# Patient Record
Sex: FEMALE | Race: WHITE | NOT HISPANIC OR LATINO | Employment: FULL TIME | URBAN - METROPOLITAN AREA
[De-identification: names, ages, dates, MRNs, and addresses within clinical notes are randomized per-mention and may not be internally consistent; named-entity substitution may affect disease eponyms.]

---

## 2017-01-24 ENCOUNTER — TELEPHONE (OUTPATIENT)
Dept: FAMILY MEDICINE | Facility: CLINIC | Age: 52
End: 2017-01-24

## 2017-01-30 ENCOUNTER — OFFICE VISIT (OUTPATIENT)
Dept: FAMILY MEDICINE | Facility: CLINIC | Age: 52
End: 2017-01-30
Payer: COMMERCIAL

## 2017-01-30 VITALS
DIASTOLIC BLOOD PRESSURE: 74 MMHG | SYSTOLIC BLOOD PRESSURE: 122 MMHG | TEMPERATURE: 97.4 F | HEIGHT: 66 IN | BODY MASS INDEX: 29.01 KG/M2 | OXYGEN SATURATION: 100 % | HEART RATE: 110 BPM | WEIGHT: 180.5 LBS

## 2017-01-30 DIAGNOSIS — N81.11 CYSTOCELE, MIDLINE: Primary | ICD-10-CM

## 2017-01-30 PROCEDURE — 99213 OFFICE O/P EST LOW 20 MIN: CPT | Performed by: FAMILY MEDICINE

## 2017-01-30 ASSESSMENT — PAIN SCALES - GENERAL: PAINLEVEL: NO PAIN (0)

## 2017-01-30 NOTE — NURSING NOTE
"Chief Complaint   Patient presents with     Vaginal Problem     Lump       Initial /74 mmHg  Pulse 110  Temp(Src) 97.4  F (36.3  C) (Temporal)  Ht 5' 6\" (1.676 m)  Wt 180 lb 8 oz (81.874 kg)  BMI 29.15 kg/m2  SpO2 100% Estimated body mass index is 29.15 kg/(m^2) as calculated from the following:    Height as of this encounter: 5' 6\" (1.676 m).    Weight as of this encounter: 180 lb 8 oz (81.874 kg).  BP completed using cuff size: jo Lloyd MA  "

## 2017-01-30 NOTE — PROGRESS NOTES
"  SUBJECTIVE:                                                    S:  Radha Mcdermott is a 51 year old female who presents to the clinic complaining of possibly pelvic prolapse. Patient states that she has been experiencing a URI for about 1 month. Two weeks ago, she coughed hard and \"felt like an organ fell, partially into her vagina\". Since then, she has been experiencing pelvic pressure, worsening when she coughs. She states that this feels like \"a squishy lump\". Patient reports that this is not painful, but is uncomfortable, rating it 3/10. She is able to sleep and do all her ADL's. She denies any urinary leakage or other associated symptoms.       Problem list and histories reviewed & adjusted, as indicated.  Additional history: as documented    Patient Active Problem List   Diagnosis     Dysmenorrhea     Contraceptive management     SCREENING-THYROID DISORDER-cold nodule     Hypocalcemia     Post-surgical hypoparathyroidism (H)     Disorder of magnesium metabolism     Papillary thyroid carcinoma (H)     Anxiety state     Postsurgical hypothyroidism     Iron deficiency anemia     CARDIOVASCULAR SCREENING; LDL GOAL LESS THAN 160     Health Care Home     Pulmonary nodules     Abdominal mass     Shoulder joint pain     Major depressive disorder, recurrent episode, mild (H)     Past Surgical History   Procedure Laterality Date      remove tonsils/adenoids,14+ y/o  1984     C thyroidectomy  10/07/2005     Total thyroidectomy.  F-[facility]. Further lymph node resection in 12/06     Biopsy/removal, lymph node(s)  10/27/2009     Surgical resection of lymph nodes, neck.  Scar revision. [facility].      biopsy/excision lymph node open superficial  2007     removal of some more lymph nodes in the neck.        Social History   Substance Use Topics     Smoking status: Former Smoker -- 0.50 packs/day for 15 years     Quit date: 03/01/1998     Smokeless tobacco: Never Used      Comment: no smoking in the home     Alcohol Use: Yes " "     Comment: social     Family History   Problem Relation Age of Onset     Allergies Son      pcn     Alzheimer Disease Paternal Grandmother      CANCER Maternal Aunt      breast - mid-30s     DIABETES Maternal Grandmother      HEART DISEASE Paternal Grandmother      Lipids Mother      OSTEOPOROSIS Mother      CEREBROVASCULAR DISEASE Paternal Grandmother      Thyroid Disease Maternal Grandmother      thyroid disease     CANCER Paternal Grandfather      prostate cancer     CANCER Father      skin     Thyroid Disease Paternal Grandmother          Current Outpatient Prescriptions   Medication Sig Dispense Refill     calcitRIOL (ROCALTROL) 0.25 MCG capsule Take 1 capsule (0.25 mcg) by mouth 2 times daily 180 capsule 3     magnesium oxide (MAG-OX) 400 MG tablet Take 0.5 tablets (200 mg) by mouth 2 times daily 60 tablet 5     fluticasone (FLONASE) 50 MCG/ACT nasal spray Spray 2 sprays into both nostrils daily 1 Bottle 1     SYNTHROID 125 MCG tablet Take 1 tablet (125 mcg) by mouth daily 90 tablet 3     multivitamin, therapeutic (THERA-VIT) TABS Take 1 tablet by mouth daily       EPINEPHrine (EPIPEN) 0.3 MG/0.3ML injection Inject 0.3 mg into the muscle once as needed for anaphylaxis       docusate sodium 100 MG tablet Take 100 mg by mouth 2 times daily as needed for constipation 60 tablet 11     Allergies   Allergen Reactions     No Known Drug Allergies        ROS:  All other ROS reviewed and are negative or non-contributory except as stated in HPI.    OBJECTIVE:                                                    /74 mmHg  Pulse 110  Temp(Src) 97.4  F (36.3  C) (Temporal)  Ht 5' 6\" (1.676 m)  Wt 180 lb 8 oz (81.874 kg)  BMI 29.15 kg/m2  SpO2 100%  Body mass index is 29.15 kg/(m^2).   Vitals noted.  Patient alert, oriented, and in no acute distress.  PELVIC: Vaginal exam reveals normal external and internal genitalia.  Cervix is closed, long and thick.  No lesions or abnormalities seen.  Bimanual exam reveals a " "nontender, nongravid uterus with no CMT.  No adnexal masses or tenderness. Moderate cystocele is noted and worsens with valsalva. No rectocele. No uterocele.     Diagnostic Test Results:  No orders of the defined types were placed in this encounter.          ASSESSMENT:                                                        ICD-10-CM    1. Cystocele, midline N81.11         PLAN:                                                      On exam, patient exhibits a moderate cystocele. Discussed that this \"prolapse\" will likely improve when she gets over her URI and stops coughing. Discussed options to treat this, including just monitoring her symptoms for the time being, as well as Kegels, Pessaries, and surgery. I encouraged her to wait until her URI subsides, and monitor her symptoms. She can make sure that she is emptying all the way when urinating, including manual assistance in emptying if needed. Patient will monitor for vaginal bleeding, urinary leakage, or other symptoms. If her symptoms become intolerable, she will notify me and I will likely set her up for a consult with the urologist. I did provide her with information regarding cystoceles. Patient is in agreement with this treatment plan. Follow up as needed, notify me with any worsening symptoms.     This document serves as a record of services personally performed by Jenna Rey MD. It was created on their behalf by Yuko Mitchell, a trained medical scribe. The creation of this record is based on the provider's personal observations and the statements of the patient. This document has been checked and approved by the attending provider.      Jenna Rey MD  [facility]  "

## 2017-02-09 PROBLEM — N81.11 CYSTOCELE, MIDLINE: Status: ACTIVE | Noted: 2017-02-09

## 2017-05-16 ENCOUNTER — MYC MEDICAL ADVICE (OUTPATIENT)
Dept: FAMILY MEDICINE | Facility: CLINIC | Age: 52
End: 2017-05-16

## 2017-05-16 DIAGNOSIS — F41.1 ANXIETY STATE: ICD-10-CM

## 2017-05-16 RX ORDER — SERTRALINE HYDROCHLORIDE 100 MG/1
100 TABLET, FILM COATED ORAL DAILY
Qty: 90 TABLET | Refills: 1 | Status: CANCELLED | OUTPATIENT
Start: 2017-05-16

## 2017-05-16 NOTE — TELEPHONE ENCOUNTER
Zoloft     Last Written Prescription Date: 04/06/16  Last Fill Quantity: 90, # refills: 1  Last Office Visit with [facility] primary care provider:  01/30/17        Last PHQ-9 score on record=   PHQ-9 SCORE 5/9/2016   Total Score -   Total Score MyChart 9 (Mild depression)   Total Score -

## 2017-06-07 ASSESSMENT — ENCOUNTER SYMPTOMS
STIFFNESS: 1
MYALGIAS: 1
NECK PAIN: 0
PANIC: 0
DEPRESSION: 1
DECREASED CONCENTRATION: 1
MUSCLE CRAMPS: 1
NERVOUS/ANXIOUS: 1
BACK PAIN: 0
ARTHRALGIAS: 1
JOINT SWELLING: 0
INSOMNIA: 1
MUSCLE WEAKNESS: 0

## 2017-06-13 ENCOUNTER — OFFICE VISIT (OUTPATIENT)
Dept: ENDOCRINOLOGY | Facility: CLINIC | Age: 52
End: 2017-06-13

## 2017-06-13 VITALS
DIASTOLIC BLOOD PRESSURE: 69 MMHG | BODY MASS INDEX: 29.3 KG/M2 | SYSTOLIC BLOOD PRESSURE: 117 MMHG | HEART RATE: 79 BPM | HEIGHT: 66 IN | WEIGHT: 182.3 LBS

## 2017-06-13 DIAGNOSIS — R97.8 ABNORMAL TUMOR MARKERS: ICD-10-CM

## 2017-06-13 DIAGNOSIS — E89.0 POSTSURGICAL HYPOTHYROIDISM: Chronic | ICD-10-CM

## 2017-06-13 DIAGNOSIS — E89.2 POST-SURGICAL HYPOPARATHYROIDISM (H): Primary | Chronic | ICD-10-CM

## 2017-06-13 DIAGNOSIS — E89.0 POSTSURGICAL HYPOTHYROIDISM: ICD-10-CM

## 2017-06-13 DIAGNOSIS — E20.9 HYPOPARATHYROIDISM (H): ICD-10-CM

## 2017-06-13 DIAGNOSIS — E87.6 HYPOKALEMIA: ICD-10-CM

## 2017-06-13 DIAGNOSIS — E89.2 POST-SURGICAL HYPOPARATHYROIDISM (H): Chronic | ICD-10-CM

## 2017-06-13 DIAGNOSIS — R91.8 PULMONARY NODULES: ICD-10-CM

## 2017-06-13 DIAGNOSIS — C73 MALIGNANT NEOPLASM OF THYROID GLAND (H): ICD-10-CM

## 2017-06-13 DIAGNOSIS — C73 PAPILLARY THYROID CARCINOMA (H): ICD-10-CM

## 2017-06-13 LAB
CREAT SERPL-MCNC: 0.91 MG/DL (ref 0.52–1.04)
GFR SERPL CREATININE-BSD FRML MDRD: 65 ML/MIN/1.7M2
MAGNESIUM SERPL-MCNC: 2.2 MG/DL (ref 1.6–2.3)
PHOSPHATE SERPL-MCNC: 3.4 MG/DL (ref 2.5–4.5)
POTASSIUM SERPL-SCNC: 3.7 MMOL/L (ref 3.4–5.3)
T4 FREE SERPL-MCNC: 1.06 NG/DL (ref 0.76–1.46)
TSH SERPL DL<=0.05 MIU/L-ACNC: 0.11 MU/L (ref 0.4–4)

## 2017-06-13 ASSESSMENT — PAIN SCALES - GENERAL: PAINLEVEL: NO PAIN (0)

## 2017-06-13 NOTE — MR AVS SNAPSHOT
After Visit Summary   6/13/2017    Radha Mcdermott    MRN: [id number]           Patient Information     Date Of Birth          1965        Visit Information        Provider Department      6/13/2017 2:00 PM Vero Arevalo MD [facility]        Today's Diagnoses     Post-surgical hypoparathyroidism (H)    -  1    Postsurgical hypothyroidism        Papillary thyroid carcinoma (H)          Care Instructions    To expedite your medication refill(s), please contact your pharmacy and have them fax a refill request to: [phone].  *Please allow 3 business days for routine medication refills.  *Please allow 5 business days for controlled substance medication refills.  --------------------  For scheduling appointments (including lab work), please request an appointment through [employer], or call: [phone].    For questions for your provider or the endocrine nurse, please send a [employer] message.  For after-hours urgent issues, please dial [phone], and ask to speak with the Endocrinologist On-Call.  --------------------  Please Note: If you are active on [employer], all future test results will be sent by [employer] message only and will no longer be sent by mail. You may also receive communication directly from your physician.            Follow-ups after your visit        Follow-up notes from your care team     Return in about 6 months (around 12/13/2017).      Your next 10 appointments already scheduled     Dec 12, 2017  1:30 PM CST   (Arrive by 1:15 PM)   RETURN ENDOCRINE with Vero Arevalo MD   [facility] ([facility])    [address] [place] [id number] [phone]              Future tests that were ordered for you today     Open Future Orders        Priority Expected Expires Ordered    TSH Routine  6/13/2018 6/13/2017    T4 free Routine  6/13/2018 6/13/2017    Thyroglobulin (Total, antibody & recovery %)  "Routine  6/13/2018 6/13/2017            Who to contact     Please call your clinic at [phone] to:    Ask questions about your health    Make or cancel appointments    Discuss your medicines    Learn about your test results    Speak to your doctor   If you have compliments or concerns about an experience at your clinic, or if you wish to file a complaint, please contact [facility] Patient Relations at [phone] or email us at [email or web address]         Additional Information About Your Visit        [email or web address] Information     [employer] gives you secure access to your electronic health record. If you see a primary care provider, you can also send messages to your care team and make appointments. If you have questions, please call your primary care clinic.  If you do not have a primary care provider, please call [phone] and they will assist you.      [employer] is an electronic gateway that provides easy, online access to your medical records. With [employer], you can request a clinic appointment, read your test results, renew a prescription or communicate with your care team.     To access your existing account, please contact your [facility] or call [phone] for assistance.        Care EveryWhere ID     This is your Care EveryWhere ID. This could be used by other organizations to access your [facility] [place] medical records  [id number]        Your Vitals Were     Pulse Height BMI (Body Mass Index)             79 1.664 m (5' 5.5\") 29.87 kg/m2          Blood Pressure from Last 3 Encounters:   06/13/17 117/69   01/30/17 122/74   12/12/16 111/77    Weight from Last 3 Encounters:   06/13/17 82.7 kg (182 lb 4.8 oz)   01/30/17 81.9 kg (180 lb 8 oz)   12/12/16 81.1 kg (178 lb 12.8 oz)               Primary Care Provider Office Phone # Fax #    Jenna Rey [phone]       [facility] [address] [place] " [phone]        Thank you!     Thank you for choosing [facility] ENDOCRIN[facility]  for your care. Our goal is always to provide you with excellent care. Hearing back from our patients is one way we can continue to improve our services. Please take a few minutes to complete the written survey that you may receive in the mail after your visit with us. Thank you!             Your Updated Medication List - Protect others around you: Learn how to safely use, store and throw away your medicines at www.disposemymeds.org.          This list is accurate as of: 6/13/17  2:34 PM.  Always use your most recent med list.                   Brand Name Dispense Instructions for use    calcitRIOL 0.25 MCG capsule    ROCALTROL    180 capsule    Take 1 capsule (0.25 mcg) by mouth 2 times daily       docusate sodium 100 MG tablet    COLACE    60 tablet    Take 100 mg by mouth 2 times daily as needed for constipation       EPINEPHrine 0.3 MG/0.3ML injection      Inject 0.3 mg into the muscle once as needed for anaphylaxis       fluticasone 50 MCG/ACT spray    FLONASE    1 Bottle    Spray 2 sprays into both nostrils daily       magnesium oxide 400 MG tablet    MAG-OX    60 tablet    Take 0.5 tablets (200 mg) by mouth 2 times daily       multivitamin, therapeutic Tabs tablet      Take 1 tablet by mouth daily       sertraline 50 MG tablet    ZOLOFT    30 tablet    Take 1/2 tablet (25 mg) for 1 week, then increase to 1 tablet orally daily       SYNTHROID 125 MCG tablet   Generic drug:  levothyroxine     90 tablet    Take 1 tablet (125 mcg) by mouth daily       TUMS ULTRA 1000 1000 MG Chew   Generic drug:  calcium carbonate antacid      Take 1,000 mg by mouth 2 times daily This is actually a gummy tablet

## 2017-06-13 NOTE — LETTER
6/13/2017     RE: Radha Mcdermott  [address] [facility] [address]     Dear Colleague,    Thank you for referring your patient, Radha Mcdermott, to the [facility] ENDOCRINOLOGY at [facility]. Please see a copy of my visit note below.     Assessment   1.  Papillary carcinoma thyroid, multifocal, largest tumor 5 cm, + ETE, multiple node positive. She been treated with total thyroidectomy and left neck dissection (in 3 operations) and also 132 mCi 131.  -     Hypothyroidism due to thyroidectomy. As per # 1.  Treat to target TH < 0.4.     surgical hypoparathyroidism (partial) since first operation -  Unstable due to increase in calcitriol since last visit.       Persistent thyroglobulinemia -Stable Tg comparing 2014 with 2010 US -     pulmonary nodules, subcm, stable on serial imaging, most recent 6/16. I am noting this as it still may be relevant to # 1    Abnormal imaging results - abnormal outside PET with ovarian uptake.  She followed up with GYN on this in the past.  Not addressed.      Vero Arevalo MD  Endocrinology & Diabetes.      Cc/ HPI  51  year old seen for post surgical hypothyroidism, post surgical hypoparathyroidism and Papillary thyroid cancer.   She was last seen by me 12/16.  Since then, we doubled the calcitriol to 0.5 mcg/day. With this she is doing much better, relative to the cramps.     She has been treated with 3 operations  10/7/05   Thyroidectomy removing  left sided PCT 5.0 x 3.0 x 2.8 cm, extending through the capsule into parathyroidal soft tissues.  There was - Microscopic foci of papillary carcinoma in the right lobe and one LN removed was positive for pCT.  Her post operative course was  complicated by hypoparathyroidism.   132 mCi 131I 11/05.     12/28/06   Left modified neck dissection.    10/22/09 selective neck dissection to include level 7 and level 4 on the left.  Surgical pathology was positive for metastatic PCT in lymph nodes  (see results)    We have the following tumor marker data:  9/9/05: Tg 290, JUAN DIEGO < 1, TSH   Operation # 1  10/28/05: Tg 131, JUAN DIEGO < 1, TSH 15.12  11/28/05: Tg 116, JUAN DIEGO <1, ,7  2/3/06: Tg 26.7, JUAN DIEGO < 1, TSH 14.43  6/2/06: Tg 2.2, JUAN DIEOG < 1, TSH 0.36  11/3/06: Tg 18, JUAN DIEGO < 1  Operation # 2  1/30/07: Tg 1.1, JUAN DIEGO < 1, TSH   4/17/07: Tg 2, JUAN DIEGO < 1, TSH   12/11/07: Tg 13.9, JUAN DIEGO < 1, TSH 42.02  1/8/08: Tg 2.7, JUAN DIEGO < 1, TSH   3/3/09: Tg 1.0, JUAN DIEGO < 0.4, TSH   4/24/09: Tg 4667, JUAN DIEGO < 0.4  Operation # 3  1/6/10: Tg 0.12, JUAN DIEGO < 0.4, TSH < 0.03  10/17/11: Tg 0.3, JUAN DIEGO < 20,   1/20/12: Tg 3.1, JUAN DIEGO < 20  3/21/12: Tg 0.1, JUAN DIEGO < 20, TSH 0.05  4/26/12: Tg 0.1, JUAN DIEGO < 20, TSH 0.02  10/26/12: Tg 3.5, JUAN DIEGO < 20, TSH 0.02 TBS -- supposedly thyrogen was given but the results and dates don't quite fit  4/26/13: Tg 0.2, JUAN DIEGO < 20, TSH 0.01  11/8/13: Tg 3.3, JUAN DIEGO < 20 TBS negative  2/10/14: Tg 0.1, JUAN DIEGO < 20  8/18/14: Tg 0.1, JUAN DIEGO < 20, TSH 0.00  11/21/14: Tg 4 ng/ml (athyrotic < 0.1, intact < 33 [place] lab) JUAN DIEGO < 1.8,TSH 0.01  12/30/14: TSH 0.01  3/4/15; Tg 0.19, JUAN DIEGO < 0.4, TSH  <0.02 ; concurrent remeasure of 1/6/10 Tg 0.15  9/2/15: Tg 0.11, JUAN DIEGO < 0.4, TSH 0.01  5/26/16: Tg 0.27, JUAN DIEGO < 0.4, TSH 0.55  12/13/16: Tg 0.14, JUAN DIEGO < 0.4, TSH 0.07, free T4 1.32      past imaging on PACs.    3/4/09 neck US: left level 7 lymph node, ? left level 4  5/27/09 PET/CT negative to my eye    Outside imaging reports:  10/26/12: 4 mCi 131I TBS negative  11/6/13: 4 mCi 131I TBS: negative  12/23/14: PET CT without contrast: focal left ovarian avidity of uncertain significance (also seen on outside PET 2010)-- this doesn't look like complete study on review on PACS today  6/7/16 US neck negative  6/7/16 CT chest: scattered subcm nodules  .  ROS   Cardiac: negative  GI: negative  No hand locking  Soreness in legs  Bruises eailys  Periods every 6 months -  Bone pain all over - especially in left shoulder, fingers and toes.    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      Depressive disorder, not elsewhere classified      Dysmenorrhea      Fracture of arm age 14    right     Gestational diabetes      Papillary thyroid carcinoma (H) 10/05    5 cm, MF, + ETE, node+; 3 operations, 131I     Postsurgical hypoparathyroidism (H) 2005     Postsurgical hypothyroidism 2005     Unspecified contraceptive management      Past Surgical History:   Procedure Laterality Date     BIOPSY/REMOVAL, LYMPH NODE(S)  10/27/2009    Surgical resection of lymph nodes, neck.  Scar revision. [facility].     C THYROIDECTOMY  10/07/2005    Total thyroidectomy.  F-[facility]. Further lymph node resection in 12/06     HC BIOPSY/EXCISION LYMPH NODE OPEN SUPERFICIAL  2007    removal of some more lymph nodes in the neck.       REMOVE TONSILS/ADENOIDS,14+ Y/O  1984     Current Outpatient Prescriptions   Medication Sig Dispense Refill     calcium carbonate antacid (TUMS ULTRA 1000) 1000 MG CHEW Take 1,000 mg by mouth 2 times daily This is actually a gummy tablet       sertraline (ZOLOFT) 50 MG tablet Take 1/2 tablet (25 mg) for 1 week, then increase to 1 tablet orally daily 30 tablet 1     calcitRIOL (ROCALTROL) 0.25 MCG capsule Take 1 capsule (0.25 mcg) by mouth 2 times daily 180 capsule 3     magnesium oxide (MAG-OX) 400 MG tablet Take 0.5 tablets (200 mg) by mouth 2 times daily 60 tablet 5     fluticasone (FLONASE) 50 MCG/ACT nasal spray Spray 2 sprays into both nostrils daily 1 Bottle 1     SYNTHROID 125 MCG tablet Take 1 tablet (125 mcg) by mouth daily 90 tablet 3     multivitamin, therapeutic (THERA-VIT) TABS Take 1 tablet by mouth daily       EPINEPHrine (EPIPEN) 0.3 MG/0.3ML injection Inject 0.3 mg into the muscle once as needed for anaphylaxis       docusate sodium 100 MG tablet Take 100 mg by mouth 2 times daily as needed for constipation  "60 tablet 11     Calcitriol is 0.5 mcg/day 0 once/day  She is actually using caltrate chews Ca vitamin D 2 500 mg chews bid -    She is not taking biotin.     Family History   Problem Relation Age of Onset     Allergies Son      pcn     Alzheimer Disease Paternal Grandmother      CANCER Maternal Aunt      breast - mid-30s     DIABETES Maternal Grandmother      HEART DISEASE Paternal Grandmother      Lipids Mother      OSTEOPOROSIS Mother      CEREBROVASCULAR DISEASE Paternal Grandmother      Thyroid Disease Maternal Grandmother      thyroid disease     CANCER Paternal Grandfather      prostate cancer     CANCER Father      skin     Thyroid Disease Paternal Grandmother      Physical Exam young woman in NAD  GENERAL: pleasant woman in NAD  /69  Pulse 79  Ht 1.664 m (5' 5.5\")  Wt 82.7 kg (182 lb 4.8 oz)  BMI 29.87 kg/m2  SKIN: normal color, temperature, texture   HEENT: PER, no scleral icterus, eyelid retraction, stare, lid lag,     NECK: healed surgical scar.  No palpable masses  LUNGS: clear to auscultation bilaterally.   CARDIAC: RRR, S1, S2 without murmurs, rubs or gallops.    BACK: normal spinal contour.    NEURO: Alert, responds appropriately to questions,  moves all extremities, gait normal, no tremor of the outstretched hand    ENDO THYROID LABS-[facility] Latest Ref Rng & Units 12/13/2016 5/26/2016   TSH 0.40 - 4.00 mU/L 0.07 (L) 0.55   T4 FREE 0.76 - 1.46 ng/dL 1.32 1.03     ENDO CALCIUM LABS-[facility] Latest Ref Rng & Units 12/13/2016 5/26/2016   CALCIUM 8.5 - 10.1 mg/dL 8.3 (L) 7.8 (L)   CALCIUM IONIZED 4.4 - 5.2 mg/dL     PHOSPHOROUS 2.5 - 4.5 mg/dL 3.8 3.4   MAGNESIUM 1.6 - 2.3 mg/dL 1.9 1.9   ALBUMIN 3.3 - 4.6 g/dL     BUN 5 - 24 mg/dL     CREATININE 0.52 - 1.04 mg/dL 0.89 0.94     ENDO CALCIUM LABS-[facility] Latest Ref Rng & Units 2/2/2016   CALCIUM 8.5 - 10.1 mg/dL 7.7 (L)   CALCIUM IONIZED 4.4 - 5.2 mg/dL    PHOSPHOROUS 2.5 - 4.5 mg/dL 3.0   MAGNESIUM 1.6 - 2.3 mg/dL 1.7   ALBUMIN 3.3 - 4.6 g/dL    BUN 5 - 24 " mg/dL    CREATININE 0.52 - 1.04 mg/dL 0.96     Vero Arevalo MD

## 2017-06-13 NOTE — PROGRESS NOTES
Assessment   1.  Papillary carcinoma thyroid, multifocal, largest tumor 5 cm, + ETE, multiple node positive. She been treated with total thyroidectomy and left neck dissection (in 3 operations) and also 132 mCi 131.  -     Hypothyroidism due to thyroidectomy. As per # 1.  Treat to target TH < 0.4.     surgical hypoparathyroidism (partial) since first operation -  Unstable due to increase in calcitriol since last visit.       Persistent thyroglobulinemia -Stable Tg comparing 2014 with 2010 [facility] -     pulmonary nodules, subcm, stable on serial imaging, most recent 6/16. I am noting this as it still may be relevant to # 1    Abnormal imaging results - abnormal outside PET with ovarian uptake.  She followed up with GYN on this in the past.  Not addressed.      Vero Arevalo MD  Endocrinology & Diabetes.      Cc/ HPI  51  year old seen for post surgical hypothyroidism, post surgical hypoparathyroidism and Papillary thyroid cancer.   She was last seen by me 12/16.  Since then, we doubled the calcitriol to 0.5 mcg/day. With this she is doing much better, relative to the cramps.     She has been treated with 3 operations  10/7/05   Thyroidectomy removing  left sided PCT 5.0 x 3.0 x 2.8 cm, extending through the capsule into parathyroidal soft tissues.  There was - Microscopic foci of papillary carcinoma in the right lobe and one LN removed was positive for pCT.  Her post operative course was  complicated by hypoparathyroidism.   132 mCi 131I 11/05.     12/28/06   Left modified neck dissection.    10/22/09 selective neck dissection to include level 7 and level 4 on the left.  Surgical pathology was positive for metastatic PCT in lymph nodes (see results)    We have the following tumor marker data:  9/9/05: Tg 290, JUAN DIEGO < 1, TSH   Operation # 1  10/28/05: Tg 131, JUAN DIEGO < 1, TSH 15.12  11/28/05: Tg 116, JUAN DIEGO <1, ,7  2/3/06: Tg 26.7, JUAN DIEGO < 1, TSH 14.43  6/2/06: Tg 2.2, JUAN DIEGO < 1, TSH 0.36  11/3/06: Tg 18, JUAN DIEGO <  1  Operation # 2  1/30/07: Tg 1.1, JUAN DIEGO < 1, TSH   4/17/07: Tg 2, JUAN DIEGO < 1, TSH   12/11/07: Tg 13.9, JUAN DIEGO < 1, TSH 42.02  1/8/08: Tg 2.7, JUAN DIEGO < 1, TSH   3/3/09: Tg 1.0, JUAN DIEGO < 0.4, TSH   4/24/09: Tg 4667, JUAN DIEGO < 0.4  Operation # 3  1/6/10: Tg 0.12, JUAN DIEGO < 0.4, TSH < 0.03  10/17/11: Tg 0.3, JUAN DIEGO < 20,   1/20/12: Tg 3.1, JUAN DIEGO < 20  3/21/12: Tg 0.1, JUAN DIEGO < 20, TSH 0.05  4/26/12: Tg 0.1, JUAN DIEGO < 20, TSH 0.02  10/26/12: Tg 3.5, JUAN DIEGO < 20, TSH 0.02 TBS -- supposedly thyrogen was given but the results and dates don't quite fit  4/26/13: Tg 0.2, JUAN DIEGO < 20, TSH 0.01  11/8/13: Tg 3.3, JUAN DIEGO < 20 TBS negative  2/10/14: Tg 0.1, JUAN DIEGO < 20  8/18/14: Tg 0.1, JUAN DIEGO < 20, TSH 0.00  11/21/14: Tg 4 ng/ml (athyrotic < 0.1, intact < 33 [facility]) JUAN DIEGO < 1.8,TSH 0.01  12/30/14: TSH 0.01  3/4/15; Tg 0.19, JUAN DIEGO < 0.4, TSH  <0.02 ; concurrent remeasure of 1/6/10 Tg 0.15  9/2/15: Tg 0.11, JUAN DIEGO < 0.4, TSH 0.01  5/26/16: Tg 0.27, JUAN DIEGO < 0.4, TSH 0.55  12/13/16: Tg 0.14, JUAN DIEGO < 0.4, TSH 0.07, free T4 1.32  6/13/17: Tg 0.17, JUAN DIEGO < 0.4, TSH 0.11- results followed appt, not discussed at appt.      past imaging on PACs.    3/4/09 neck US: left level 7 lymph node, ? left level 4  5/27/09 PET/CT negative to my eye    Outside imaging reports:  10/26/12: 4 mCi 131I TBS negative  11/6/13: 4 mCi 131I TBS: negative  12/23/14: PET CT without contrast: focal left ovarian avidity of uncertain significance (also seen on outside PET 2010)-- this doesn't look like complete study on review on PACS today  6/7/16 US neck negative  6/7/16 CT chest: scattered subcm nodules  .  ROS   Cardiac: negative  GI: negative  No hand locking  Soreness in legs  Bruises eailys  Periods every 6 months -  Bone pain all over - especially in left shoulder, fingers and toes.    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       Depressive disorder, not elsewhere classified      Dysmenorrhea      Fracture of arm age 14    right     Gestational diabetes      Papillary thyroid carcinoma (H) 10/05    5 cm, MF, + ETE, node+; 3 operations, 131I     Postsurgical hypoparathyroidism (H) 2005     Postsurgical hypothyroidism 2005     Unspecified contraceptive management      Past Surgical History:   Procedure Laterality Date     BIOPSY/REMOVAL, LYMPH NODE(S)  10/27/2009    Surgical resection of lymph nodes, neck.  Scar revision. [facility].     C THYROIDECTOMY  10/07/2005    Total thyroidectomy.  [facility]. Further lymph node resection in 12/06     HC BIOPSY/EXCISION LYMPH NODE OPEN SUPERFICIAL  2007    removal of some more lymph nodes in the neck.      HC REMOVE TONSILS/ADENOIDS,14+ Y/O  1984       Current Outpatient Prescriptions   Medication Sig Dispense Refill     calcium carbonate antacid (TUMS ULTRA 1000) 1000 MG CHEW Take 1,000 mg by mouth 2 times daily This is actually a gummy tablet       sertraline (ZOLOFT) 50 MG tablet Take 1/2 tablet (25 mg) for 1 week, then increase to 1 tablet orally daily 30 tablet 1     calcitRIOL (ROCALTROL) 0.25 MCG capsule Take 1 capsule (0.25 mcg) by mouth 2 times daily 180 capsule 3     magnesium oxide (MAG-OX) 400 MG tablet Take 0.5 tablets (200 mg) by mouth 2 times daily 60 tablet 5     fluticasone (FLONASE) 50 MCG/ACT nasal spray Spray 2 sprays into both nostrils daily 1 Bottle 1     SYNTHROID 125 MCG tablet Take 1 tablet (125 mcg) by mouth daily 90 tablet 3     multivitamin, therapeutic (THERA-VIT) TABS Take 1 tablet by mouth daily       EPINEPHrine (EPIPEN) 0.3 MG/0.3ML injection Inject 0.3 mg into the muscle once as needed for anaphylaxis       docusate sodium 100 MG tablet Take 100 mg by mouth 2 times daily as needed for constipation 60 tablet 11     Calcitriol is 0.5 mcg/day 0 once/day  She is actually using caltrate chews Ca vitamin D 2 500 mg chews bid -    She is not taking biotin.       Family History  "  Problem Relation Age of Onset     Allergies Son      pcn     Alzheimer Disease Paternal Grandmother      CANCER Maternal Aunt      breast - mid-30s     DIABETES Maternal Grandmother      HEART DISEASE Paternal Grandmother      Lipids Mother      OSTEOPOROSIS Mother      CEREBROVASCULAR DISEASE Paternal Grandmother      Thyroid Disease Maternal Grandmother      thyroid disease     CANCER Paternal Grandfather      prostate cancer     CANCER Father      skin     Thyroid Disease Paternal Grandmother        Physical Exam young woman in NAD  GENERAL: pleasant woman in NAD  /69  Pulse 79  Ht 1.664 m (5' 5.5\")  Wt 82.7 kg (182 lb 4.8 oz)  BMI 29.87 kg/m2  SKIN: normal color, temperature, texture   HEENT: PER, no scleral icterus, eyelid retraction, stare, lid lag,     NECK: healed surgical scar.  No palpable masses  LUNGS: clear to auscultation bilaterally.   CARDIAC: RRR, S1, S2 without murmurs, rubs or gallops.    BACK: normal spinal contour.    NEURO: Alert, responds appropriately to questions,  moves all extremities, gait normal, no tremor of the outstretched hand    Results for MELONY LOPEZ (MRN [id number]) as of 7/7/2017 09:34   Ref. Range 6/13/2017 14:51   Potassium Latest Ref Range: 3.4 - 5.3 mmol/L 3.7   Creatinine Latest Ref Range: 0.52 - 1.04 mg/dL 0.91   GFR Estimate Latest Ref Range: >60 mL/min/1.7m2 65   GFR Estimate If Black Latest Ref Range: >60 mL/min/1.7m2 79   Calcium Latest Ref Range: 8.5 - 10.1 mg/dL 8.3 (L)   Magnesium Latest Ref Range: 1.6 - 2.3 mg/dL 2.2   Phosphorus Latest Ref Range: 2.5 - 4.5 mg/dL 3.4   T4 Free Latest Ref Range: 0.76 - 1.46 ng/dL 1.06   TSH Latest Ref Range: 0.40 - 4.00 mU/L 0.11 (L)     ENDO THYROID LABS-UMP Latest Ref Rng & Units 12/13/2016 5/26/2016   TSH 0.40 - 4.00 mU/L 0.07 (L) 0.55   T4 FREE 0.76 - 1.46 ng/dL 1.32 1.03     ENDO CALCIUM LABS-UMP Latest Ref Rng & Units 12/13/2016 5/26/2016   CALCIUM 8.5 - 10.1 mg/dL 8.3 (L) 7.8 (L)   CALCIUM IONIZED 4.4 - 5.2 " mg/dL     PHOSPHOROUS 2.5 - 4.5 mg/dL 3.8 3.4   MAGNESIUM 1.6 - 2.3 mg/dL 1.9 1.9   ALBUMIN 3.3 - 4.6 g/dL     BUN 5 - 24 mg/dL     CREATININE 0.52 - 1.04 mg/dL 0.89 0.94     ENDO CALCIUM LABS-UMP Latest Ref Rng & Units 2/2/2016   CALCIUM 8.5 - 10.1 mg/dL 7.7 (L)   CALCIUM IONIZED 4.4 - 5.2 mg/dL    PHOSPHOROUS 2.5 - 4.5 mg/dL 3.0   MAGNESIUM 1.6 - 2.3 mg/dL 1.7   ALBUMIN 3.3 - 4.6 g/dL    BUN 5 - 24 mg/dL    CREATININE 0.52 - 1.04 mg/dL 0.96

## 2017-06-13 NOTE — PATIENT INSTRUCTIONS
To expedite your medication refill(s), please contact your pharmacy and have them fax a refill request to: [phone].  *Please allow 3 business days for routine medication refills.  *Please allow 5 business days for controlled substance medication refills.  --------------------  For scheduling appointments (including lab work), please request an appointment through [employer], or call: [phone].    For questions for your provider or the endocrine nurse, please send a [employer] message.  For after-hours urgent issues, please dial [phone], and ask to speak with the Endocrinologist On-Call.  --------------------  Please Note: If you are active on [employer], all future test results will be sent by [employer] message only and will no longer be sent by mail. You may also receive communication directly from your physician.

## 2017-06-14 ENCOUNTER — TELEPHONE (OUTPATIENT)
Dept: ENDOCRINOLOGY | Facility: CLINIC | Age: 52
End: 2017-06-14

## 2017-06-14 LAB — CALCIUM SERPL-MCNC: 8.3 MG/DL (ref 8.5–10.1)

## 2017-06-14 NOTE — TELEPHONE ENCOUNTER
----- Message from Vero Arevalo MD sent at 6/13/2017 10:08 PM CDT -----  Regarding: call lab  Please call the lab and ask them to add on calcium to the sample they have on her from today.   I have already added the order on EPIC.  Thanks  Vero Arevalo

## 2017-06-16 DIAGNOSIS — C73 MALIGNANT NEOPLASM OF THYROID GLAND (H): ICD-10-CM

## 2017-06-16 DIAGNOSIS — E89.0 POSTSURGICAL HYPOTHYROIDISM: ICD-10-CM

## 2017-06-20 RX ORDER — LEVOTHYROXINE SODIUM 125 MCG
125 TABLET ORAL DAILY
Qty: 90 TABLET | Refills: 3 | Status: SHIPPED | OUTPATIENT
Start: 2017-06-20 | End: 2018-08-08

## 2017-06-20 NOTE — TELEPHONE ENCOUNTER
SYNTHROID    Last Written Prescription Date:  5/1/16  Last Fill Quantity: 90,   # refills: 3  Last Office Visit : 6/13/17  Future Office visit:  12/12/17

## 2017-06-21 LAB — LAB SCANNED RESULT: NORMAL

## 2017-12-21 ENCOUNTER — TELEPHONE (OUTPATIENT)
Dept: FAMILY MEDICINE | Facility: CLINIC | Age: 52
End: 2017-12-21

## 2017-12-21 DIAGNOSIS — C73 PAPILLARY THYROID CARCINOMA (H): ICD-10-CM

## 2017-12-21 DIAGNOSIS — E89.2 POST-SURGICAL HYPOPARATHYROIDISM (H): Chronic | ICD-10-CM

## 2017-12-21 DIAGNOSIS — E89.0 POSTSURGICAL HYPOTHYROIDISM: Chronic | ICD-10-CM

## 2017-12-21 DIAGNOSIS — E20.89 OTHER HYPOPARATHYROIDISM (H): ICD-10-CM

## 2017-12-21 NOTE — TELEPHONE ENCOUNTER
"12/21/2017      Patient Communication Preferences indicate  Do not contact  and/or communication by \"Phone\" is not preferred. Call not required per Outreach team.          Outreach ,  Hawa Covarrubias    "

## 2017-12-22 RX ORDER — CALCITRIOL 0.25 UG/1
CAPSULE, LIQUID FILLED ORAL
Qty: 180 CAPSULE | Refills: 2 | Status: SHIPPED | OUTPATIENT
Start: 2017-12-22 | End: 2019-06-02

## 2017-12-22 RX ORDER — MAGNESIUM OXIDE 400 MG/1
TABLET ORAL
Qty: 90 TABLET | Refills: 2 | Status: SHIPPED | OUTPATIENT
Start: 2017-12-22 | End: 2018-12-02

## 2017-12-22 NOTE — TELEPHONE ENCOUNTER
Mag oxide   Last Written Prescription Date:  12/12/16  Last Fill Quantity: 60,   # refills: 5  calcitriol      Last Written Prescription Date:  12/13/16  Last Fill Quantity: 180,   # refills: 3  Last Office Visit : 6/13/17  Future Office visit:  No future appt    Routing refill request to provider for review/approval because:   Non-Protocol

## 2018-07-11 ENCOUNTER — TELEPHONE (OUTPATIENT)
Dept: FAMILY MEDICINE | Facility: CLINIC | Age: 53
End: 2018-07-11

## 2018-07-11 NOTE — TELEPHONE ENCOUNTER
Radha Mcdermott is a 52 year old female  who calls with abdominal pain.    NURSING ASSESSMENT:  The pain began 3 days ago.    Pain scale (0-10): 5/10 constant but 8/10 after eats.  When it spikes it lasts around 15 minutes.  LMP  was  Just finishing.  The pain is described as burning and pressure and is located RUQ and LUQ, which is without radiation.  Symptom associated with the abdominal pain: lightheaded.  Patient has not had previous abdominal surgery, including none.  Pain is aggravated by eating, and relieved by nothing.  Allergies:   Allergies   Allergen Reactions     Chicken Allergy Swelling     Meat Extract Swelling     No Known Drug Allergies        RECOMMENDED DISPOSITION:  See in 24 hours - appt made for the am.  Will comply with recommendation: Yes  If further questions/concerns or if symptoms do not improve, worsen or new symptoms develop, call your PCP or [facility] as soon as possible.    Guideline used: abd pain-adult  Telephone Triage Protocols for Nurses, Fifth Edition, Hope Reaves RN

## 2018-07-12 ENCOUNTER — OFFICE VISIT (OUTPATIENT)
Dept: FAMILY MEDICINE | Facility: OTHER | Age: 53
End: 2018-07-12
Payer: COMMERCIAL

## 2018-07-12 VITALS
HEIGHT: 65 IN | WEIGHT: 193.5 LBS | DIASTOLIC BLOOD PRESSURE: 60 MMHG | HEART RATE: 72 BPM | TEMPERATURE: 96.9 F | SYSTOLIC BLOOD PRESSURE: 118 MMHG | BODY MASS INDEX: 32.24 KG/M2 | RESPIRATION RATE: 12 BRPM

## 2018-07-12 DIAGNOSIS — R10.13 ABDOMINAL PAIN, EPIGASTRIC: Primary | ICD-10-CM

## 2018-07-12 PROCEDURE — 87338 HPYLORI STOOL AG IA: CPT | Performed by: PHYSICIAN ASSISTANT

## 2018-07-12 PROCEDURE — 99213 OFFICE O/P EST LOW 20 MIN: CPT | Performed by: PHYSICIAN ASSISTANT

## 2018-07-12 RX ORDER — OMEPRAZOLE 40 MG/1
40 CAPSULE, DELAYED RELEASE ORAL DAILY
Qty: 30 CAPSULE | Refills: 1 | Status: SHIPPED | OUTPATIENT
Start: 2018-07-12 | End: 2018-09-24

## 2018-07-12 ASSESSMENT — PAIN SCALES - GENERAL: PAINLEVEL: MODERATE PAIN (5)

## 2018-07-12 NOTE — NURSING NOTE
Health Maintenance Due   Topic Date Due     HIV SCREEN (SYSTEM ASSIGNED)  07/17/1985     LIPID SCREEN Q5 YR FEMALE (SYSTEM ASSIGNED)  09/20/2016     MAMMO SCREEN Q2 YR (SYSTEM ASSIGNED)  07/17/2017     COLON CANCER SCREEN (SYSTEM ASSIGNED)  07/17/2017     TSH Q1 YEAR  06/13/2018     Asha DAVIS LPN

## 2018-07-12 NOTE — MR AVS SNAPSHOT
After Visit Summary   7/12/2018    Radha Mcdermott    MRN: [id number]           Patient Information     Date Of Birth          1965        Visit Information        Provider Department      7/12/2018 8:00 AM Philipp Martinez PA-C [facility]        Today's Diagnoses     Abdominal pain, epigastric    -  1      Care Instructions      Gastritis or Ulcer, No Antibiotic Treatment    Gastritis is irritation and inflammation of the stomach lining. This means the lining is red and swollen. An ulcer is an open sore in the lining of the stomach. It may also occur in the first part of the small intestine (duodenum). The causes and symptoms of gastritis and ulcers are very similar.  Causes and risk factors for both problems can include:    Long-term use of nonsteroidal anti-inflammatory drugs (NSAIDs), such as aspirin and ibuprofen    H. pylori bacteria infection    Tobacco use    Alcohol use  Symptoms for both problems can include:    Dull or burning pain in the upper part of the belly    Loss of appetite    Heartburn or upset stomach    Frequent burping    Bloated feeling    Nausea with or without vomiting  You likely had an evaluation to help determine the exact cause and extent of your problem. This may have included a health history, exam, and certain tests.  Results showed that your problem is not due to H. pylori infection. For this reason, no antibiotics are needed as part of your treatment.  Whether your problem is found to be gastritis or an ulcer, you will still need to take other medicines, however. You will also need to follow instructions to help reduce stomach irritation so your stomach can heal.   Home care    Take any medicines you re prescribed exactly as directed. Common medicines used to treat gastritis include:  ? Antacids. These help neutralize the normal acids in your stomach.  ? Proton pump inhibitors. These block your stomach from making any acid.  ? H2 blockers.These  reduce the amount of acid your stomach makes.  ? Bismuth subsalicylate. This helps protect the lining of your stomach from acid.    Don't take any NSAIDs during your treatment. If you take NSAID to help treat other health problems, tell your healthcare provider. He or she may need to adjust your medicine plan or change the dosage.    Don t use tobacco. Also don t drink alcohol. These products can increase the amount of acid your stomach makes. This can delay healing. It can also worsen symptoms.  Follow-up care  Follow up with your healthcare provider, or as advised. In some cases, further testing may be needed.  When to seek medical advice  Call your healthcare provider right away if any of these occur:    Fever of 100.4 F (38 C) or higher, or as directed by your healthcare provider    Stomach pain that worsens or moves to the lower right part of abdomen    Extreme fatigue    Weakness or dizziness    Continued weight loss    Frequent vomiting, blood in your vomit, or coffee ground-like substance in your vomit    Black, tarry, or bloody stools  Call 911  Call 911 if any of these occur:    Chest pain appears or worsens, or spreads to the back, neck, shoulder, or arm    Unusually fast heart rate    Trouble breathing or swallowing    Confusion    Extreme drowsiness or trouble waking up    Fainting    Large amounts of blood present in vomit or stool  Date Last Reviewed: 6/16/2015 2000-2017 [employer]. [address] [place] [address]. All rights reserved. This information is not intended as a substitute for professional medical care. Always follow your healthcare professional's instructions.                Follow-ups after your visit        Your next 10 appointments already scheduled     Sep 24, 2018  3:40 PM CDT   (Arrive by 3:25 PM)   [facility] with Vero Areval[facility]   [facility] Endocrinology ([facility])    [address] [place] [address]  "[facility] [address] [phone]              Future tests that were ordered for you today     Open Future Orders        Priority Expected Expires Ordered    H Pylori antigen, stool Routine  8/11/2018 7/12/2018            Who to contact     If you have questions or need follow up information about today's clinic visit or your schedule please contact [facility] directly at [phone].  Normal or non-critical lab and imaging results will be communicated to you by [employer], letter or phone within 4 business days after the clinic has received the results. If you do not hear from us within 7 days, please contact the clinic through [employer] or phone. If you have a critical or abnormal lab result, we will notify you by phone as soon as possible.  Submit refill requests through [employer] or call your pharmacy and they will forward the refill request to us. Please allow 3 business days for your refill to be completed.          Additional Information About Your Visit        [employer] Information     [employer] gives you secure access to your electronic health record. If you see a primary care provider, you can also send messages to your care team and make appointments. If you have questions, please call your primary care clinic.  If you do not have a primary care provider, please call [phone] and they will assist you.        Care EveryWhere ID     This is your Care EveryWhere ID. This could be used by other organizations to access your [facility] medical records  [id number]        Your Vitals Were     Pulse Temperature Respirations Height BMI (Body Mass Index)       72 96.9  F (36.1  C) (Oral) 12 5' 5.1\" (1.654 m) 32.1 kg/m2        Blood Pressure from Last 3 Encounters:   07/12/18 118/60   06/13/17 117/69   01/30/17 122/74    Weight from Last 3 Encounters:   07/12/18 193 lb 8 oz (87.8 kg)   06/13/17 182 lb 4.8 oz (82.7 kg)   01/30/17 180 lb 8 oz (81.9 kg)                 Today's Medication Changes    "       These changes are accurate as of 7/12/18  8:59 AM.  If you have any questions, ask your nurse or doctor.               Start taking these medicines.        Dose/Directions    omeprazole 40 MG capsule   Commonly known as:  priLOSEC   Used for:  Abdominal pain, epigastric   Started by:  Philipp Martinez PA-C        Dose:  40 mg   Take 1 capsule (40 mg) by mouth daily Take 30-60 minutes before a meal.   Quantity:  30 capsule   Refills:  1            Where to get your medicines      These medications were sent to [facility] [place], [place] - [address] [facility]   [address] [facility] [address]     Phone:  [phone]     omeprazole 40 MG capsule                Primary Care Provider Office Phone # Fax #    Jenna Mignon Rey [phone]        [facility]   [facility] [place] [address]        Equal Access to Services     [facility]: Hadii darek lagunas hadasho Soomaali, waaxda luqadaha, qaybta kaalmada adeegyada, harvey dockery haydevorah jacques . [facility] [phone].    ATENCIÓN: Si habla español, tiene a campos disposición servicios gratuitos de asistencia lingüística. Llame al [phone].    We comply with applicable federal civil rights laws and [place] laws. We do not discriminate on the basis of race, color, national origin, age, disability, sex, sexual orientation, or gender identity.            Thank you!     Thank you for choosing [facility]  for your care. Our goal is always to provide you with excellent care. Hearing back from our patients is one way we can continue to improve our services. Please take a few minutes to complete the written survey that you may receive in the mail after your visit with us. Thank you!             Your Updated Medication List - Protect others around you: Learn how to safely use, store and throw away your medicines at www.disposemymeds.org.          This list is accurate as of 7/12/18  8:59 AM.  Always use your most  recent med list.                   Brand Name Dispense Instructions for use Diagnosis    calcitRIOL 0.25 MCG capsule    ROCALTROL    180 capsule    TAKE ONE CAPSULE BY MOUTH TWICE A DAY    Other hypoparathyroidism (H)       docusate sodium 100 MG tablet    COLACE    60 tablet    Take 100 mg by mouth 2 times daily as needed for constipation    External hemorrhoids       EPINEPHrine 0.3 MG/0.3ML injection 2-pack    EPIPEN/ADRENACLICK/or ANY BX GENERIC EQUIV     Inject 0.3 mg into the muscle once as needed for anaphylaxis        fluticasone 50 MCG/ACT spray    FLONASE    1 Bottle    Spray 2 sprays into both nostrils daily    Dysfunction of Eustachian tube, right       magnesium oxide 400 MG tablet    MAG-OX    90 tablet    TAKE ONE-HALF TABLET BY MOUTH TWICE A DAY    Papillary thyroid carcinoma (H), Post-surgical hypoparathyroidism (H), Postsurgical hypothyroidism       multivitamin, therapeutic Tabs tablet      Take 1 tablet by mouth daily        omeprazole 40 MG capsule    priLOSEC    30 capsule    Take 1 capsule (40 mg) by mouth daily Take 30-60 minutes before a meal.    Abdominal pain, epigastric       SYNTHROID 125 MCG tablet   Generic drug:  levothyroxine     90 tablet    Take 1 tablet (125 mcg) by mouth daily    Malignant neoplasm of thyroid gland (H), Postsurgical hypothyroidism       TUMS ULTRA 1000 1000 MG Chew   Generic drug:  calcium carbonate antacid      Take 1,000 mg by mouth 2 times daily This is actually a gummy tablet    Post-surgical hypoparathyroidism (H), Postsurgical hypothyroidism, Papillary thyroid carcinoma (H)

## 2018-07-12 NOTE — PATIENT INSTRUCTIONS
Gastritis or Ulcer, No Antibiotic Treatment    Gastritis is irritation and inflammation of the stomach lining. This means the lining is red and swollen. An ulcer is an open sore in the lining of the stomach. It may also occur in the first part of the small intestine (duodenum). The causes and symptoms of gastritis and ulcers are very similar.  Causes and risk factors for both problems can include:    Long-term use of nonsteroidal anti-inflammatory drugs (NSAIDs), such as aspirin and ibuprofen    H. pylori bacteria infection    Tobacco use    Alcohol use  Symptoms for both problems can include:    Dull or burning pain in the upper part of the belly    Loss of appetite    Heartburn or upset stomach    Frequent burping    Bloated feeling    Nausea with or without vomiting  You likely had an evaluation to help determine the exact cause and extent of your problem. This may have included a health history, exam, and certain tests.  Results showed that your problem is not due to H. pylori infection. For this reason, no antibiotics are needed as part of your treatment.  Whether your problem is found to be gastritis or an ulcer, you will still need to take other medicines, however. You will also need to follow instructions to help reduce stomach irritation so your stomach can heal.   Home care    Take any medicines you re prescribed exactly as directed. Common medicines used to treat gastritis include:  ? Antacids. These help neutralize the normal acids in your stomach.  ? Proton pump inhibitors. These block your stomach from making any acid.  ? H2 blockers.These reduce the amount of acid your stomach makes.  ? Bismuth subsalicylate. This helps protect the lining of your stomach from acid.    Don't take any NSAIDs during your treatment. If you take NSAID to help treat other health problems, tell your healthcare provider. He or she may need to adjust your medicine plan or change the dosage.    Don t use tobacco. Also don t  drink alcohol. These products can increase the amount of acid your stomach makes. This can delay healing. It can also worsen symptoms.  Follow-up care  Follow up with your healthcare provider, or as advised. In some cases, further testing may be needed.  When to seek medical advice  Call your healthcare provider right away if any of these occur:    Fever of 100.4 F (38 C) or higher, or as directed by your healthcare provider    Stomach pain that worsens or moves to the lower right part of abdomen    Extreme fatigue    Weakness or dizziness    Continued weight loss    Frequent vomiting, blood in your vomit, or coffee ground-like substance in your vomit    Black, tarry, or bloody stools  Call 911  Call 911 if any of these occur:    Chest pain appears or worsens, or spreads to the back, neck, shoulder, or arm    Unusually fast heart rate    Trouble breathing or swallowing    Confusion    Extreme drowsiness or trouble waking up    Fainting    Large amounts of blood present in vomit or stool  Date Last Reviewed: 6/16/2015 2000-2017 The "[employer]. [address] [place] [address]. All rights reserved. This information is not intended as a substitute for professional medical care. Always follow your healthcare professional's instructions.

## 2018-07-12 NOTE — PROGRESS NOTES
SUBJECTIVE:   Radha Mcdermott is a 52 year old female who presents to clinic today for the following health issues:      ABDOMINAL PAIN     Onset: 1 week    Description:   Character: Burning, throbbing  Location: epigastric region right upper quadrant left upper quadrant  Radiation: None    Intensity: moderate, severe    Progression of Symptoms:  same    Accompanying Signs & Symptoms:  Fever/Chills?: YES- chills  Gas/Bloating: YES- bloating  Nausea: no   Vomitting: no   Diarrhea?: no   Constipation:no   Dysuria or Hematuria: no    History:   Trauma: no   Previous similar pain: no    Previous tests done: x-ray    Precipitating factors:   Does the pain change with:     Food: YES     BM: no     Urination: no     Alleviating factors:  Not eating    Therapies Tried and outcome: Tums,soft foods, not eating as much,  no relief    LMP:  not applicable       Patient is a pleasant 52 year old female who is in today due to epigastric pain. She said that her pain has been occurring for 1 week and is worsened by eating food. She describes the pain as a burning/throbbing sensation normally a 2/10, but increases to 8/10 while eating. Pain typically lasts 15-30 minutes. Denies fever, fatigue, worse with fatty meals, changes in bowel/bladder, chest pain, radiation of pain to the back, trouble breathing, ripping/tearing sensation or rash. Discussed possible gastric ulcer vs gallstone. Suggested that we start with PPI therapy and test for H. Pylori given negative exam findings. If symptoms persist, then we will consider US.     Problem list and histories reviewed & adjusted, as indicated.  Additional history: as documented    Patient Active Problem List   Diagnosis     Dysmenorrhea     Contraceptive management     SCREENING-THYROID DISORDER-cold nodule     Hypocalcemia     Post-surgical hypoparathyroidism (H)     Disorder of magnesium metabolism     Papillary thyroid carcinoma (H)     Anxiety state     Postsurgical hypothyroidism      Iron deficiency anemia     CARDIOVASCULAR SCREENING; LDL GOAL LESS THAN 160     Health Care Home     Pulmonary nodules     Abdominal mass     Shoulder joint pain     Major depressive disorder, recurrent episode, mild (H)     Cystocele, midline     Past Surgical History:   Procedure Laterality Date     BIOPSY/REMOVAL, LYMPH NODE(S)  10/27/2009    Surgical resection of lymph nodes, neck.  Scar revision. [facility].     C THYROIDECTOMY  10/07/2005    Total thyroidectomy.  F[facility]. Further lymph node resection in 12/06     HC BIOPSY/EXCISION LYMPH NODE OPEN SUPERFICIAL  2007    removal of some more lymph nodes in the neck.       REMOVE TONSILS/ADENOIDS,14+ Y/O  1984       Social History   Substance Use Topics     Smoking status: Former Smoker     Packs/day: 0.50     Years: 15.00     Quit date: 3/1/1998     Smokeless tobacco: Never Used      Comment: no smoking in the home     Alcohol use Yes      Comment: social     Family History   Problem Relation Age of Onset     Lipids Mother      Osteoperosis Mother      Cancer Father      skin     Cancer Paternal Grandfather      prostate cancer     Alzheimer Disease Paternal Grandmother      HEART DISEASE Paternal Grandmother      Cerebrovascular Disease Paternal Grandmother      Thyroid Disease Paternal Grandmother      Diabetes Maternal Grandmother      Thyroid Disease Maternal Grandmother      thyroid disease     Allergies Son      pcn     Cancer Maternal Aunt      breast - mid-30s         Current Outpatient Prescriptions   Medication Sig Dispense Refill     calcitRIOL (ROCALTROL) 0.25 MCG capsule TAKE ONE CAPSULE BY MOUTH TWICE A  capsule 2     docusate sodium 100 MG tablet Take 100 mg by mouth 2 times daily as needed for constipation 60 tablet 11     EPINEPHrine (EPIPEN) 0.3 MG/0.3ML injection Inject 0.3 mg into the muscle once as needed for anaphylaxis       magnesium oxide (MAG-OX) 400 MG tablet TAKE ONE-HALF TABLET BY MOUTH TWICE A DAY 90 tablet 2      "multivitamin, therapeutic (THERA-VIT) TABS Take 1 tablet by mouth daily       omeprazole (PRILOSEC) 40 MG capsule Take 1 capsule (40 mg) by mouth daily Take 30-60 minutes before a meal. 30 capsule 1     SYNTHROID 125 MCG tablet Take 1 tablet (125 mcg) by mouth daily 90 tablet 3     calcium carbonate antacid (TUMS ULTRA 1000) 1000 MG CHEW Take 1,000 mg by mouth 2 times daily This is actually a gummy tablet       fluticasone (FLONASE) 50 MCG/ACT nasal spray Spray 2 sprays into both nostrils daily (Patient not taking: Reported on 7/12/2018) 1 Bottle 1     Allergies   Allergen Reactions     Bees      Chicken Allergy Swelling     Meat Extract Swelling     No Known Drug Allergies      Labs reviewed in EPIC    Reviewed and updated as needed this visit by clinical staff  Tobacco  Allergies  Meds  Med Hx  Surg Hx  Fam Hx  Soc Hx      Reviewed and updated as needed this visit by Provider         ROS:  Constitutional, HEENT, cardiovascular, pulmonary, gi and gu systems are negative, except as otherwise noted.    OBJECTIVE:     /60 (BP Location: Left arm, Patient Position: Chair, Cuff Size: Adult Large)  Pulse 72  Temp 96.9  F (36.1  C) (Oral)  Resp 12  Ht 5' 5.1\" (1.654 m)  Wt 193 lb 8 oz (87.8 kg)  BMI 32.1 kg/m2  Body mass index is 32.1 kg/(m^2).  GENERAL: healthy, alert and no distress  NECK: no adenopathy, no asymmetry, masses, or scars and thyroid normal to palpation  RESP: lungs clear to auscultation - no rales, rhonchi or wheezes  CV: regular rate and rhythm, normal S1 S2, no S3 or S4, no murmur, click or rub, no peripheral edema and peripheral pulses strong  ABDOMEN: soft, nontender, no hepatosplenomegaly, no masses and bowel sounds normal, negative murphys exam, epigastric tenderness to deep palpation  MS: no gross musculoskeletal defects noted, no edema  SKIN: no suspicious lesions or rashes  NEURO: Normal strength and tone, mentation intact and speech normal  PSYCH: mentation appears normal, affect " normal/bright    Diagnostic Test Results:  none     ASSESSMENT/PLAN:     1. Abdominal pain, epigastric  We will test for H. Pylori today and start PPI, if test returns positive for infection we will add clarithromycin and amoxicillin to treatment. If patient does not improve as expected she will contact clinic and we will consider US for possible gallstone.   - H Pylori antigen, stool; Future  - omeprazole (PRILOSEC) 40 MG capsule; Take 1 capsule (40 mg) by mouth daily Take 30-60 minutes before a meal.  Dispense: 30 capsule; Refill: 1    Follow up with clinic as needed or sooner if conditions change, worsen or fail to improve as expected.      Philipp Martinez PA-C  [facility]

## 2018-07-13 DIAGNOSIS — R10.13 ABDOMINAL PAIN, EPIGASTRIC: ICD-10-CM

## 2018-07-16 LAB
H PYLORI AG STL QL IA: NORMAL
SPECIMEN SOURCE: NORMAL

## 2018-08-08 DIAGNOSIS — C73 MALIGNANT NEOPLASM OF THYROID GLAND (H): ICD-10-CM

## 2018-08-08 DIAGNOSIS — E89.0 POSTSURGICAL HYPOTHYROIDISM: ICD-10-CM

## 2018-08-09 RX ORDER — LEVOTHYROXINE SODIUM 125 MCG
125 TABLET ORAL DAILY
Qty: 90 TABLET | Refills: 0 | Status: SHIPPED | OUTPATIENT
Start: 2018-08-09 | End: 2018-12-01

## 2018-08-09 NOTE — TELEPHONE ENCOUNTER
SYNTHROID 125 MCG    Last Written Prescription Date:  6/20/17  Last Fill Quantity: 90,   # refills: 3  Last Office Visit : 6/13/17  Future Office visit:  9/24/18    Routing refill request to provider for review/approval    TSH   6/13/17

## 2018-09-07 ENCOUNTER — TELEPHONE (OUTPATIENT)
Dept: FAMILY MEDICINE | Facility: OTHER | Age: 53
End: 2018-09-07

## 2018-09-07 NOTE — TELEPHONE ENCOUNTER
Panel Management Review      Patient has the following on her problem list:       Composite cancer screening  Chart review shows that this patient is due/due soon for the following   Summary:    Patient is due/failing the following:   Tsh, COLONOSCOPY, LDL and MAMMOGRAM    Action needed:   Mammogram, colonoscopy and lab appointment    Type of outreach:    Sent Revl message.    Questions for provider review:    None                                                                                                                                    Asha DAVIS LPN       Chart routed to Asha DAVIS LPN   .

## 2018-09-07 NOTE — LETTER
[facility]  [address] [facility] [id number] [phone]        Radha Mcdermott  [address] [place] [id number]      September 25, 2018      Dear Radha,    I care about your health and have reviewed your health plan, including your medical conditions, medication list, and lab results and am making recommendations based on this review, to better manage your health.    You are in particular need of attention regarding:  -Cholesterol  -Breast Cancer Screening  -Colon Cancer Screening    I am recommending that you:  -schedule a MAMMOGRAM which is due. You can call  [phone] to schedule your mammogram.    -schedule a COLONOSCOPY.  Colon cancer is now the second leading cause of cancer-related deaths in the [place] for both men and women.  There are over 130,000 new cases and 50,000 deaths per year from colon cancer.  A recent study, which included patients ages 55 to 79 found 50,400 American deaths from colorectal cancer could have been prevented if patients had undergone a colonoscopy in the previous 10 years.    If you have not had a colonoscopy, we encourage you to schedule by contacting us at [phone], Monday through Friday.  After hours, you may leave a message and we will return your call during normal business hours.      There is another option called a FIT test, if you don t wish to have a colonoscopy, which needs to be repeated every year.  It does replace the colonoscopy for colorectal cancer screening and can detect hidden bleeding in the lower colon.  If a positive result is obtained, you would be referred for a colonoscopy. Please discuss this option with your provider.      For patients under/uninsured, we recommend you contact the [employer] program. [employer] is a free colorectal cancer screening program that provides colonoscopies for eligible under/uninsured [place] men and women. If you are interested in receiving a free colonoscopy,  please call [employer] at [phone] (mention code Sc[employer]Web) to see if you re eligible.   - Lab appointment to check your cholesterol.    If you've had the preventative screening completed at another facility or feel you're not due for this screening, please call our clinic at the number listed above or send us a My Chart message so we can update our records. We would like to thank you in advance for taking the time to take care of your health.  If you have any questions, please don t hesitate to contact our clinic.    Sincerely,       Your [facility] Team

## 2018-09-24 ENCOUNTER — OFFICE VISIT (OUTPATIENT)
Dept: ENDOCRINOLOGY | Facility: CLINIC | Age: 53
End: 2018-09-24
Payer: COMMERCIAL

## 2018-09-24 VITALS
BODY MASS INDEX: 32.3 KG/M2 | SYSTOLIC BLOOD PRESSURE: 127 MMHG | WEIGHT: 193.9 LBS | HEIGHT: 65 IN | DIASTOLIC BLOOD PRESSURE: 77 MMHG | HEART RATE: 72 BPM

## 2018-09-24 DIAGNOSIS — E89.2 POST-SURGICAL HYPOPARATHYROIDISM (H): Chronic | ICD-10-CM

## 2018-09-24 DIAGNOSIS — E66.811 CLASS 1 OBESITY WITH SERIOUS COMORBIDITY AND BODY MASS INDEX (BMI) OF 32.0 TO 32.9 IN ADULT, UNSPECIFIED OBESITY TYPE: ICD-10-CM

## 2018-09-24 DIAGNOSIS — E89.0 POSTSURGICAL HYPOTHYROIDISM: Chronic | ICD-10-CM

## 2018-09-24 DIAGNOSIS — E89.2 POST-SURGICAL HYPOPARATHYROIDISM (H): Primary | Chronic | ICD-10-CM

## 2018-09-24 DIAGNOSIS — C73 PAPILLARY THYROID CARCINOMA (H): ICD-10-CM

## 2018-09-24 DIAGNOSIS — E83.51 HYPOCALCEMIA: ICD-10-CM

## 2018-09-24 LAB
CALCIUM SERPL-MCNC: 8.3 MG/DL (ref 8.5–10.1)
CREAT SERPL-MCNC: 1.01 MG/DL (ref 0.52–1.04)
GFR SERPL CREATININE-BSD FRML MDRD: 58 ML/MIN/1.7M2
HBA1C MFR BLD: 5.5 % (ref 0–5.6)
PHOSPHATE SERPL-MCNC: 3 MG/DL (ref 2.5–4.5)
POTASSIUM SERPL-SCNC: 3.8 MMOL/L (ref 3.4–5.3)

## 2018-09-24 ASSESSMENT — PAIN SCALES - GENERAL: PAINLEVEL: NO PAIN (0)

## 2018-09-24 NOTE — LETTER
9/24/2018     RE: Radha Mcdermott  [address] [facility] [id number]     Dear Colleague,    Thank you for referring your patient, Radha Mcdermott, to the [facility] at [facility]. Please see a copy of my visit note below.     Assessment   1.  Papillary carcinoma thyroid, multifocal, largest tumor 5 cm, + ETE, multiple node positive. She been treated with total thyroidectomy and left neck dissection (in 3 operations) and also 132 mCi 131.  -   Labs: Tg, TSH, Free T4 today and yearly    Hypothyroidism due to thyroidectomy. As per # 1.  Treat to target TH < 0.4.     surgical hypoparathyroidism (partial) since first operation -  She is not taking calcium and she is intolerant of  Standing orders for labs every 6 months.  Discussed.      Persistent thyroglobulinemia -current status unknown - labs today    pulmonary nodules, subcm, stable on serial imaging, most recent 6/16. I am noting this as it still may be relevant to # 1    Obesity - she has gained weight .  I will measure A1c.      Vero Arevalo MD  Endocrinology & Diabetes.      Cc/ HPI  53  year old seen for post surgical hypothyroidism, post surgical hypoparathyroidism and Papillary thyroid cancer.   She was last seen by me 6/17.      She has been treated with 3 operations  10/7/05   Thyroidectomy removing  left sided PCT 5.0 x 3.0 x 2.8 cm, extending through the capsule into parathyroidal soft tissues.  There was - Microscopic foci of papillary carcinoma in the right lobe and one LN removed was positive for pCT.  Her post operative course was  complicated by hypoparathyroidism.   132 mCi 131I 11/05.     12/28/06   Left modified neck dissection.    10/22/09 selective neck dissection to include level 7 and level 4 on the left.  Surgical pathology was positive for metastatic PCT in lymph nodes (see results)    We have the following tumor marker data:  9/9/05: Tg 290, JUAN DIEGO < 1, TSH   Operation #  1  10/28/05: Tg 131, JUAN DIEGO < 1, TSH 15.12  11/28/05: Tg 116, JUAN DIEGO <1, ,7  2/3/06: Tg 26.7, JUAN DIEGO < 1, TSH 14.43  6/2/06: Tg 2.2, JUAN DIEGO < 1, TSH 0.36  11/3/06: Tg 18, JUAN DIEGO < 1  Operation # 2  1/30/07: Tg 1.1, JUAN DIEGO < 1, TSH   4/17/07: Tg 2, JUAN DIEGO < 1, TSH   12/11/07: Tg 13.9, JUAN DIEGO < 1, TSH 42.02  1/8/08: Tg 2.7, JUAN DIEGO < 1, TSH   3/3/09: Tg 1.0, JUAN DIEGO < 0.4, TSH   4/24/09: Tg 4667, JUAN DIEGO < 0.4  Operation # 3  1/6/10: Tg 0.12, JUAN DIEGO < 0.4, TSH < 0.03  10/17/11: Tg 0.3, JUAN DIEGO < 20,   1/20/12: Tg 3.1, JUAN DIEGO < 20  3/21/12: Tg 0.1, JUAN DIEGO < 20, TSH 0.05  4/26/12: Tg 0.1, JUAN DIEGO < 20, TSH 0.02  10/26/12: Tg 3.5, JUAN DIEGO < 20, TSH 0.02 TBS -- supposedly thyrogen was given but the results and dates don't quite fit  4/26/13: Tg 0.2, JUAN DIEGO < 20, TSH 0.01  11/8/13: Tg 3.3, JUAN DIEGO < 20 TBS negative  2/10/14: Tg 0.1, JUAN DIEGO < 20  8/18/14: Tg 0.1, JUAN DIEGO < 20, TSH 0.00  11/21/14: Tg 4 ng/ml (athyrotic < 0.1, intact < 33 [place] [facility]) JUAN DIEGO < 1.8,TSH 0.01  12/30/14: TSH 0.01  3/4/15; Tg 0.19, JUAN DIEGO < 0.4, TSH  <0.02 ; concurrent remeasure of 1/6/10 Tg 0.15  9/2/15: Tg 0.11, JUAN DIEGO < 0.4, TSH 0.01  5/26/16: Tg 0.27, JUAN DIEGO < 0.4, TSH 0.55  12/13/16: Tg 0.14, JUAN DIEGO < 0.4, TSH 0.07, free T4 1.32  6/13/17: Tg 0.17, JUAN DIEGO < 0.4, TSH 0.11-       past imaging on PACs.    3/4/09 neck US: left level 7 lymph node, ? left level 4  5/27/09 PET/CT negative to my eye    Outside imaging reports:  10/26/12: 4 mCi 131I TBS negative  11/6/13: 4 mCi 131I TBS: negative  12/23/14: PET CT without contrast: focal left ovarian avidity of uncertain significance (also seen on outside PET 2010)-- t  6/7/16 US neck negative  6/7/16 CT chest: scattered subcm nodules  .  ROS   Energy level is variable   Sleep is not so good - falls asleep and wakes  Night sweats and hot flashes -  Weight gain - working with nutritionist now - on gluten free diet to reduce inflammation.   Cardiac: negative  Respiratory: MENJIVAR and tired with exertion - this has been x about one yaer  GI: negative  Toes and feet are getting cramps  "again, sometimes in hands  No hand locking  Toes twost  LMP last week - periods are \"sometimes\"  Less pain since on gluten free  Joints still hurt  Questions about vitamin D and cortisol levels - has read she should have these checked.       PMH   Past Medical History:   Diagnosis Date     Abnormal Papanicolaou smear of cervix and cervical HPV 12/03    ASCUS - repeat paps all ok.      Depressive disorder, not elsewhere classified      Dysmenorrhea      Fracture of arm age 14    right     Gestational diabetes      Papillary thyroid carcinoma (H) 10/05    5 cm, MF, + ETE, node+; 3 operations, 131I     Postsurgical hypoparathyroidism (H) 2005     Postsurgical hypothyroidism 2005     Unspecified contraceptive management      Past Surgical History:   Procedure Laterality Date     BIOPSY/REMOVAL, LYMPH NODE(S)  10/27/2009    Surgical resection of lymph nodes, neck.  Scar revision. [facility].     C THYROIDECTOMY  10/07/2005    Total thyroidectomy.  F[facility]. Further lymph node resection in 12/06      BIOPSY/EXCISION LYMPH NODE OPEN SUPERFICIAL  2007    removal of some more lymph nodes in the neck.       REMOVE TONSILS/ADENOIDS,14+ Y/O  1984       Current Outpatient Prescriptions   Medication Sig Dispense Refill     calcitRIOL (ROCALTROL) 0.25 MCG capsule TAKE ONE CAPSULE BY MOUTH TWICE A  capsule 2     docusate sodium 100 MG tablet Take 100 mg by mouth 2 times daily as needed for constipation 60 tablet 11     EPINEPHrine (EPIPEN) 0.3 MG/0.3ML injection Inject 0.3 mg into the muscle once as needed for anaphylaxis       magnesium oxide (MAG-OX) 400 MG tablet TAKE ONE-HALF TABLET BY MOUTH TWICE A DAY 90 tablet 2     multivitamin, therapeutic (THERA-VIT) TABS Take 1 tablet by mouth daily       SYNTHROID 125 MCG tablet Take 1 tablet (125 mcg) by mouth daily 90 tablet 0       Family History   Problem Relation Age of Onset     Lipids Mother      Osteoporosis Mother      Cancer Father      skin     Cancer Paternal " "Grandfather      prostate cancer     Alzheimer Disease Paternal Grandmother      HEART DISEASE Paternal Grandmother      Cerebrovascular Disease Paternal Grandmother      Thyroid Disease Paternal Grandmother      Diabetes Maternal Grandmother      Thyroid Disease Maternal Grandmother      thyroid disease     Allergies Son      pcn     Cancer Maternal Aunt      breast - mid-30s       Social History     Social History     Marital status:      Spouse name: Mushtaq     Number of children: 2     Years of education: 12+     Occupational History     Pharm. [employer]     Social History Main Topics     Smoking status: Former Smoker     Packs/day: 0.50     Years: 15.00     Quit date: 3/1/1998     Smokeless tobacco: Never Used      Comment: no smoking in the home     Alcohol use Yes      Comment: social     Drug use: No     Sexual activity: Yes     Partners: Male     Birth control/ protection: None     Other Topics Concern      Service No     Blood Transfusions No     Caffeine Concern No     occasional     Occupational Exposure No     Hobby Hazards No     Sleep Concern No     Stress Concern No     Weight Concern No     Special Diet No     Back Care No     Exercise Yes     Bike Helmet No     Seat Belt Yes     Self-Exams Yes     Social History Narrative    Lives with son.     Has been gluten free x 1 month    Physical Exam young woman in NAD. She looks heavier  GENERAL: pleasant woman in NAD  /77  Pulse 72  Ht 1.654 m (5' 5.12\")  Wt 88 kg (193 lb 14.4 oz)  BMI 32.15 kg/m2  SKIN: normal color, temperature, texture   HEENT: PER, no scleral icterus, eyelid retraction, stare, lid lag,     NECK: healed surgical scar.  No palpable masses  LUNGS: clear to auscultation bilaterally.   CARDIAC: RRR, S1, S2 without murmurs, rubs or gallops.    BACK: normal spinal contour.    NEURO: Alert, responds appropriately to questions,  moves all extremities, gait normal, no tremor of the outstretched " hand    Results for MELONY LOPEZ (MRN [id number]) as of 9/25/2018 08:57   Ref. Range 9/24/2018 16:16   Potassium Latest Ref Range: 3.4 - 5.3 mmol/L 3.8   Creatinine Latest Ref Range: 0.52 - 1.04 mg/dL 1.01   GFR Estimate Latest Ref Range: >60 mL/min/1.7m2 58 (L)   GFR Estimate If Black Latest Ref Range: >60 mL/min/1.7m2 70   Calcium Latest Ref Range: 8.5 - 10.1 mg/dL 8.3 (L)   Phosphorus Latest Ref Range: 2.5 - 4.5 mg/dL 3.0   Hemoglobin A1C Latest Ref Range: 0 - 5.6 % 5.5     Again, thank you for allowing me to participate in the care of your patient.      Sincerely,    Vero Arevalo MD

## 2018-09-24 NOTE — PATIENT INSTRUCTIONS
Labs today    Get labs approximately every 6 months.  You have standing orders for this    See me about once/year      24 hour urine calcium at your convenience        Dietary sources of calcium: These also contain vitamin D  Milk / buttermilk              8 oz            300 mg  Dry milk powder       5 Tbsp             300 mg  Yogurt                          1 cup           400 mg  Ice cream   1/2 cup          100 mg  Hard cheese                     1.5 oz          300 mg  Cottage cheese                1/2 cup        65 mg  Spinach, cooked  1 cup  240 mg  Tofu, soft regular           4 oz          120 to 390 mg  Sardines                       3 oz               370 mg  Mackerel canned         3 oz                250 mg  Canned salmon with bones 3 oz        170-210 mg  Calcium fortified cereals are available  SILK soy and almond milk products are calcium fortified  Orange juice  Fortified with Calcium       8 oz            300 mg  Low fat dairy sources are recommended

## 2018-09-24 NOTE — MR AVS SNAPSHOT
After Visit Summary   9/24/2018    Radha Mcdermott    MRN: [id number]           Patient Information     Date Of Birth          1965        Visit Information        Provider Department      9/24/2018 3:40 PM Vero Arevalo MD [facility]        Today's Diagnoses     Post-surgical hypoparathyroidism (H)    -  1    Hypocalcemia        Papillary thyroid carcinoma (H)        Postsurgical hypothyroidism          Care Instructions    Labs today    Get labs approximately every 6 months.  You have standing orders for this    See me about once/year      24 hour urine calcium at your convenience        Dietary sources of calcium: These also contain vitamin D  Milk / buttermilk              8 oz            300 mg  Dry milk powder       5 Tbsp             300 mg  Yogurt                          1 cup           400 mg  Ice cream   1/2 cup          100 mg  Hard cheese                     1.5 oz          300 mg  Cottage cheese                1/2 cup        65 mg  Spinach, cooked  1 cup  240 mg  Tofu, soft regular           4 oz          120 to 390 mg  Sardines                       3 oz               370 mg  Mackerel canned         3 oz                250 mg  Canned salmon with bones 3 oz        170-210 mg  Calcium fortified cereals are available  SILK soy and almond milk products are calcium fortified  Orange juice  Fortified with Calcium       8 oz            300 mg  Low fat dairy sources are recommended              Follow-ups after your visit        Follow-up notes from your care team     Return in about 1 year (around 9/24/2019).      Your next 10 appointments already scheduled     Sep 24, 2018  4:00 PM CDT   LAB with  LAB    Health Lab ([facility])    [address] [place] [id number] [phone]           Please do not eat 10-12 hours before your appointment if you are coming in fasting for labs on lipids, cholesterol, or glucose  (sugar). This does not apply to pregnant women. Water, hot tea and black coffee (with nothing added) are okay. Do not drink other fluids, diet soda or chew gum.              Future tests that were ordered for you today     Open Standing Orders        Priority Remaining Interval Expires Ordered    TSH Routine 2/2 1 year 9/24/2019 9/24/2018    T4 free Routine 2/2 1 year 9/24/2019 9/24/2018    Thyroglobulin (Total, antibody & recovery %) Routine 2/2 1 year 9/24/2019 9/24/2018    Calcium Routine 3/3 6 months 9/24/2019 9/24/2018    Phosphorus Routine 3/3 6 months 9/24/2019 9/24/2018    Creatinine Routine 3/3 6 months 9/24/2019 9/24/2018    Potassium Routine 3/3 6 months 9/24/2019 9/24/2018          Open Future Orders        Priority Expected Expires Ordered    25 Hydroxyvitamin D2 and D3 Routine  9/24/2019 9/24/2018    Calcium timed urine with Creat Ratio Routine 9/25/2018 3/23/2019 9/24/2018    Creatinine timed urine Routine 9/25/2018 3/23/2019 9/24/2018            Who to contact     Please call your clinic at [phone] to:    Ask questions about your health    Make or cancel appointments    Discuss your medicines    Learn about your test results    Speak to your doctor            Additional Information About Your Visit        [employer] Information     [employer] gives you secure access to your electronic health record. If you see a primary care provider, you can also send messages to your care team and make appointments. If you have questions, please call your primary care clinic.  If you do not have a primary care provider, please call [phone] and they will assist you.      [employer] is an electronic gateway that provides easy, online access to your medical records. With [employer], you can request a clinic appointment, read your test results, renew a prescription or communicate with your care team.     To access your existing account, please contact your [facility] or call [phone]  "for assistance.        Care EveryWhere ID     This is your Care EveryWhere ID. This could be used by other organizations to access your [facility] medical records  [id number]        Your Vitals Were     Pulse Height BMI (Body Mass Index)             72 1.654 m (5' 5.12\") 32.15 kg/m2          Blood Pressure from Last 3 Encounters:   09/24/18 127/77   07/12/18 118/60   06/13/17 117/69    Weight from Last 3 Encounters:   09/24/18 88 kg (193 lb 14.4 oz)   07/12/18 87.8 kg (193 lb 8 oz)   06/13/17 82.7 kg (182 lb 4.8 oz)               Primary Care Provider Office Phone # Fax #    Jenna Mignon Rey [phone] [address] [place] [id number]        Equal Access to Services     [facility]: Hadii darek lagunas hadasho Sosilvia, waaxda luqadaha, qaybta kaalmada adeegyada, harvey dockery haydevorah jacques . So [place] [phone].    ATENCIÓN: Si habla español, tiene a campos disposición servicios gratuitos de asistencia lingüística. Llame al [phone].    We comply with applicable federal civil rights laws and [place] laws. We do not discriminate on the basis of race, color, national origin, age, disability, sex, sexual orientation, or gender identity.            Thank you!     Thank you for choosing [facility]  for your care. Our goal is always to provide you with excellent care. Hearing back from our patients is one way we can continue to improve our services. Please take a few minutes to complete the written survey that you may receive in the mail after your visit with us. Thank you!             Your Updated Medication List - Protect others around you: Learn how to safely use, store and throw away your medicines at www.disposemymeds.org.          This list is accurate as of 9/24/18  3:55 PM.  Always use your most recent med list.                   Brand Name Dispense Instructions for use Diagnosis    calcitRIOL 0.25 MCG capsule    ROCALTROL    180 capsule    TAKE ONE " CAPSULE BY MOUTH TWICE A DAY    Other hypoparathyroidism (H)       docusate sodium 100 MG tablet    COLACE    60 tablet    Take 100 mg by mouth 2 times daily as needed for constipation    External hemorrhoids       EPINEPHrine 0.3 MG/0.3ML injection 2-pack    EPIPEN/ADRENACLICK/or ANY BX GENERIC EQUIV     Inject 0.3 mg into the muscle once as needed for anaphylaxis        magnesium oxide 400 MG tablet    MAG-OX    90 tablet    TAKE ONE-HALF TABLET BY MOUTH TWICE A DAY    Papillary thyroid carcinoma (H), Post-surgical hypoparathyroidism (H), Postsurgical hypothyroidism       multivitamin, therapeutic Tabs tablet      Take 1 tablet by mouth daily        SYNTHROID 125 MCG tablet   Generic drug:  levothyroxine     90 tablet    Take 1 tablet (125 mcg) by mouth daily    Malignant neoplasm of thyroid gland (H), Postsurgical hypothyroidism

## 2018-09-24 NOTE — PROGRESS NOTES
Assessment   1.  Papillary carcinoma thyroid, multifocal, largest tumor 5 cm, + ETE, multiple node positive. She been treated with total thyroidectomy and left neck dissection (in 3 operations) and also 132 mCi 131.  -   Labs: Tg, TSH, Free T4 today and yearly    Hypothyroidism due to thyroidectomy. As per # 1.  Treat to target TH < 0.4.     surgical hypoparathyroidism (partial) since first operation -  She is not taking calcium and she is intolerant of dairy  Standing orders for labs every 6 months.  Discussed.      Persistent thyroglobulinemia -current status unknown - labs today    pulmonary nodules, subcm, stable on serial imaging, most recent 6/16. I am noting this as it still may be relevant to # 1    Obesity - she has gained weight .  I will measure A1c.      Vero Arevalo MD  Endocrinology & Diabetes.      Cc/ HPI  53  year old seen for post surgical hypothyroidism, post surgical hypoparathyroidism and Papillary thyroid cancer.   She was last seen by me 6/17.      She has been treated with 3 operations  10/7/05   Thyroidectomy removing  left sided PCT 5.0 x 3.0 x 2.8 cm, extending through the capsule into parathyroidal soft tissues.  There was - Microscopic foci of papillary carcinoma in the right lobe and one LN removed was positive for pCT.  Her post operative course was  complicated by hypoparathyroidism.   132 mCi 131I 11/05.     12/28/06   Left modified neck dissection.    10/22/09 selective neck dissection to include level 7 and level 4 on the left.  Surgical pathology was positive for metastatic PCT in lymph nodes (see results)    We have the following tumor marker data:  9/9/05: Tg 290, JUAN DIEGO < 1, TSH   Operation # 1  10/28/05: Tg 131, JUAN DIEGO < 1, TSH 15.12  11/28/05: Tg 116, JUAN DIEGO <1, ,7  2/3/06: Tg 26.7, JUAN DIEGO < 1, TSH 14.43  6/2/06: Tg 2.2, JUAN DIEGO < 1, TSH 0.36  11/3/06: Tg 18, JUAN DIEGO < 1  Operation # 2  1/30/07: Tg 1.1, JUAN DIEGO < 1, TSH   4/17/07: Tg 2, JUAN DIEGO < 1, TSH   12/11/07: Tg 13.9, JUAN DIEGO < 1, TSH  42.02  1/8/08: Tg 2.7, JUAN DIEGO < 1, TSH   3/3/09: Tg 1.0, JUAN DIEGO < 0.4, TSH   4/24/09: Tg 4667, JUAN DIEGO < 0.4  Operation # 3  1/6/10: Tg 0.12, JUAN DIEGO < 0.4, TSH < 0.03  10/17/11: Tg 0.3, JUAN DIEGO < 20,   1/20/12: Tg 3.1, JUAN DIEGO < 20  3/21/12: Tg 0.1, JUAN DIEGO < 20, TSH 0.05  4/26/12: Tg 0.1, JUAN DIEGO < 20, TSH 0.02  10/26/12: Tg 3.5, JUAN DIEGO < 20, TSH 0.02 TBS -- supposedly thyrogen was given but the results and dates don't quite fit  4/26/13: Tg 0.2, JUAN DIEGO < 20, TSH 0.01  11/8/13: Tg 3.3, JUAN DIEGO < 20 TBS negative  2/10/14: Tg 0.1, JUAN DIEGO < 20  8/18/14: Tg 0.1, JUAN DIEGO < 20, TSH 0.00  11/21/14: Tg 4 ng/ml (athyrotic < 0.1, intact < 33 [facility] lab) JUAN DIEGO < 1.8,TSH 0.01  12/30/14: TSH 0.01  3/4/15; Tg 0.19, JUAN DIEGO < 0.4, TSH  <0.02 ; concurrent remeasure of 1/6/10 Tg 0.15  9/2/15: Tg 0.11, JUAN DIEGO < 0.4, TSH 0.01  5/26/16: Tg 0.27, JUAN DIEGO < 0.4, TSH 0.55  12/13/16: Tg 0.14, JUAN DIEGO < 0.4, TSH 0.07, free T4 1.32  6/13/17: Tg 0.17, JUAN DIEGO < 0.4, TSH 0.11-   9/24/18: Tg 0.18, JUAN DIEGO < 0.4, TSH 0.14 , free T 4 1.22, results followed appt, not discussed at appt      past imaging on PACs.    3/4/09 neck US: left level 7 lymph node, ? left level 4  5/27/09 PET/CT negative to my eye    Outside imaging reports:  10/26/12: 4 mCi 131I TBS negative  11/6/13: 4 mCi 131I TBS: negative  12/23/14: PET CT without contrast: focal left ovarian avidity of uncertain significance (also seen on outside PET 2010)-- t  6/7/16 US neck negative  6/7/16 CT chest: scattered subcm nodules    Calcium intake  No milk, ice cream, yogurt  A little cheese  Lactose intolerant  She is not on calcium supplement.    .  ROS   Energy level is variable   Sleep is not so good - falls asleep and wakes  Night sweats and hot flashes -  Weight gain - working with nutritionist now - on gluten free diet to reduce inflammation.   Cardiac: negative  Respiratory: MENJIVAR and tired with exertion - this has been x about one yaer  GI: negative  Toes and feet are getting cramps again, sometimes in hands  No hand locking  Toes twost  LMP last  "week - periods are \"sometimes\"  Less pain since on gluten free  Joints still hurt  Questions about vitamin D and cortisol levels - has read she should have these checked.       PMH   Past Medical History:   Diagnosis Date     Abnormal Papanicolaou smear of cervix and cervical HPV 12/03    ASCUS - repeat paps all ok.      Depressive disorder, not elsewhere classified      Dysmenorrhea      Fracture of arm age 14    right     Gestational diabetes      Papillary thyroid carcinoma (H) 10/05    5 cm, MF, + ETE, node+; 3 operations, 131I     Postsurgical hypoparathyroidism (H) 2005     Postsurgical hypothyroidism 2005     Unspecified contraceptive management      Past Surgical History:   Procedure Laterality Date     BIOPSY/REMOVAL, LYMPH NODE(S)  10/27/2009    Surgical resection of lymph nodes, neck.  Scar revision. [facility].     C THYROIDECTOMY  10/07/2005    Total thyroidectomy.  -[facility]. Further lymph node resection in 12/06      BIOPSY/EXCISION LYMPH NODE OPEN SUPERFICIAL  2007    removal of some more lymph nodes in the neck.       REMOVE TONSILS/ADENOIDS,14+ Y/O  1984       Current Outpatient Prescriptions   Medication Sig Dispense Refill     calcitRIOL (ROCALTROL) 0.25 MCG capsule TAKE ONE CAPSULE BY MOUTH TWICE A  capsule 2     docusate sodium 100 MG tablet Take 100 mg by mouth 2 times daily as needed for constipation 60 tablet 11     EPINEPHrine (EPIPEN) 0.3 MG/0.3ML injection Inject 0.3 mg into the muscle once as needed for anaphylaxis       magnesium oxide (MAG-OX) 400 MG tablet TAKE ONE-HALF TABLET BY MOUTH TWICE A DAY 90 tablet 2     multivitamin, therapeutic (THERA-VIT) TABS Take 1 tablet by mouth daily       SYNTHROID 125 MCG tablet Take 1 tablet (125 mcg) by mouth daily 90 tablet 0       Family History   Problem Relation Age of Onset     Lipids Mother      Osteoporosis Mother      Cancer Father      skin     Cancer Paternal Grandfather      prostate cancer     Alzheimer Disease Paternal Grandmother " "     HEART DISEASE Paternal Grandmother      Cerebrovascular Disease Paternal Grandmother      Thyroid Disease Paternal Grandmother      Diabetes Maternal Grandmother      Thyroid Disease Maternal Grandmother      thyroid disease     Allergies Son      pcn     Cancer Maternal Aunt      breast - mid-30s       Social History     Social History     Marital status:      Spouse name: Mushtaq     Number of children: 2     Years of education: 12+     Occupational History     [employer]arm. [employer]     Social History Main Topics     Smoking status: Former Smoker     Packs/day: 0.50     Years: 15.00     Quit date: 3/1/1998     Smokeless tobacco: Never Used      Comment: no smoking in the home     Alcohol use Yes      Comment: social     Drug use: No     Sexual activity: Yes     Partners: Male     Birth control/ protection: None     Other Topics Concern      Service No     Blood Transfusions No     Caffeine Concern No     occasional     Occupational Exposure No     Hobby Hazards No     Sleep Concern No     Stress Concern No     Weight Concern No     Special Diet No     Back Care No     Exercise Yes     Bike Helmet No     Seat Belt Yes     Self-Exams Yes     Social History Narrative    Lives with son.     Has been gluten free x 1 month    Physical Exam young woman in NAD. She looks heavier  GENERAL: pleasant woman in NAD  /77  Pulse 72  Ht 1.654 m (5' 5.12\")  Wt 88 kg (193 lb 14.4 oz)  BMI 32.15 kg/m2  SKIN: normal color, temperature, texture   HEENT: PER, no scleral icterus, eyelid retraction, stare, lid lag,     NECK: healed surgical scar.  No palpable masses  LUNGS: clear to auscultation bilaterally.   CARDIAC: RRR, S1, S2 without murmurs, rubs or gallops.    BACK: normal spinal contour.    NEURO: Alert, responds appropriately to questions,  moves all extremities, gait normal, no tremor of the outstretched hand    Results for MELONY LOPEZ (MRN [id number]) as of 9/25/2018 08:57   Ref. " Range 9/24/2018 16:16   Potassium Latest Ref Range: 3.4 - 5.3 mmol/L 3.8   Creatinine Latest Ref Range: 0.52 - 1.04 mg/dL 1.01   GFR Estimate Latest Ref Range: >60 mL/min/1.7m2 58 (L)   GFR Estimate If Black Latest Ref Range: >60 mL/min/1.7m2 70   Calcium Latest Ref Range: 8.5 - 10.1 mg/dL 8.3 (L)   Phosphorus Latest Ref Range: 2.5 - 4.5 mg/dL 3.0   Hemoglobin A1C Latest Ref Range: 0 - 5.6 % 5.5

## 2018-09-25 ENCOUNTER — TELEPHONE (OUTPATIENT)
Dept: ENDOCRINOLOGY | Facility: CLINIC | Age: 53
End: 2018-09-25

## 2018-09-25 PROBLEM — E66.811 CLASS 1 OBESITY WITH SERIOUS COMORBIDITY AND BODY MASS INDEX (BMI) OF 32.0 TO 32.9 IN ADULT, UNSPECIFIED OBESITY TYPE: Status: ACTIVE | Noted: 2018-09-25

## 2018-09-25 LAB
T4 FREE SERPL-MCNC: 1.22 NG/DL (ref 0.76–1.46)
TSH SERPL DL<=0.005 MIU/L-ACNC: 0.14 MU/L (ref 0.4–4)

## 2018-09-25 NOTE — TELEPHONE ENCOUNTER
[facility]  [place]   States labs drawn at [facility];  Roldan they will contact Pt to arrange additional draw

## 2018-09-25 NOTE — TELEPHONE ENCOUNTER
----- Message from Vero Arevalo MD sent at 9/25/2018  8:58 AM CDT -----  Regarding: call lab  Please call the [facility] lab.  They missed all the thyroid labs that were ordered on her.  They need to add on or get her back for redraw.    File act report on missed labs.    Vero Arevalo

## 2018-09-28 LAB
DEPRECATED CALCIDIOL+CALCIFEROL SERPL-MC: <40 UG/L (ref 20–75)
VITAMIN D2 SERPL-MCNC: <5 UG/L
VITAMIN D3 SERPL-MCNC: 35 UG/L

## 2018-10-02 LAB — LAB SCANNED RESULT: NORMAL

## 2018-10-10 DIAGNOSIS — E89.0 POSTSURGICAL HYPOTHYROIDISM: Chronic | ICD-10-CM

## 2018-10-10 DIAGNOSIS — E83.51 HYPOCALCEMIA: ICD-10-CM

## 2018-10-10 DIAGNOSIS — E89.2 POST-SURGICAL HYPOPARATHYROIDISM (H): Chronic | ICD-10-CM

## 2018-10-10 DIAGNOSIS — C73 PAPILLARY THYROID CARCINOMA (H): ICD-10-CM

## 2018-10-10 LAB
CALCIUM 24H UR-MRATE: 0.08 G/24 H (ref 0.1–0.3)
CALCIUM UR-MCNC: 6.8 MG/DL
CALCIUM/CREAT UR: 0.11 G/G CR
COLLECT DURATION TIME UR: 24 H
CREAT 24H UR-MRATE: 0.77 G/(24.H) (ref 0.8–1.8)
CREAT UR-MCNC: 63 MG/DL
SPECIMEN VOL UR: 1225 ML

## 2018-10-10 PROCEDURE — 82340 ASSAY OF CALCIUM IN URINE: CPT

## 2018-10-10 PROCEDURE — 81050 URINALYSIS VOLUME MEASURE: CPT

## 2018-11-29 ENCOUNTER — HOSPITAL ENCOUNTER (EMERGENCY)
Facility: CLINIC | Age: 53
Discharge: ANOTHER HOSPITAL | End: 2018-11-29
Attending: FAMILY MEDICINE | Admitting: FAMILY MEDICINE
Payer: COMMERCIAL

## 2018-11-29 ENCOUNTER — APPOINTMENT (OUTPATIENT)
Dept: GENERAL RADIOLOGY | Facility: CLINIC | Age: 53
End: 2018-11-29
Attending: FAMILY MEDICINE
Payer: COMMERCIAL

## 2018-11-29 ENCOUNTER — HOSPITAL ENCOUNTER (OUTPATIENT)
Facility: CLINIC | Age: 53
Setting detail: OBSERVATION
Discharge: HOME | End: 2018-12-01
Attending: INTERNAL MEDICINE | Admitting: INTERNAL MEDICINE
Payer: COMMERCIAL

## 2018-11-29 ENCOUNTER — APPOINTMENT (OUTPATIENT)
Dept: CARDIOLOGY | Facility: CLINIC | Age: 53
End: 2018-11-29
Attending: PHYSICIAN ASSISTANT
Payer: COMMERCIAL

## 2018-11-29 VITALS
OXYGEN SATURATION: 96 % | TEMPERATURE: 97.4 F | SYSTOLIC BLOOD PRESSURE: 116 MMHG | HEART RATE: 66 BPM | BODY MASS INDEX: 32.17 KG/M2 | WEIGHT: 194 LBS | DIASTOLIC BLOOD PRESSURE: 63 MMHG | RESPIRATION RATE: 27 BRPM

## 2018-11-29 DIAGNOSIS — E89.2 POST-SURGICAL HYPOPARATHYROIDISM (H): Chronic | ICD-10-CM

## 2018-11-29 DIAGNOSIS — E78.2 MIXED HYPERLIPIDEMIA: Primary | ICD-10-CM

## 2018-11-29 DIAGNOSIS — I20.0 UNSTABLE ANGINA PECTORIS (H): ICD-10-CM

## 2018-11-29 DIAGNOSIS — I20.0 UNSTABLE ANGINA (H): ICD-10-CM

## 2018-11-29 DIAGNOSIS — C73 PAPILLARY THYROID CARCINOMA (H): ICD-10-CM

## 2018-11-29 PROBLEM — R07.9 CHEST PAIN: Status: ACTIVE | Noted: 2018-11-29

## 2018-11-29 LAB
ALBUMIN SERPL-MCNC: 3.8 G/DL (ref 3.4–5)
ALP SERPL-CCNC: 71 U/L (ref 40–150)
ALT SERPL W P-5'-P-CCNC: 29 U/L (ref 0–50)
ANION GAP SERPL CALCULATED.3IONS-SCNC: 6 MMOL/L (ref 3–14)
ANION GAP SERPL CALCULATED.3IONS-SCNC: 9 MMOL/L (ref 3–14)
AST SERPL W P-5'-P-CCNC: 17 U/L (ref 0–45)
BASOPHILS # BLD AUTO: 0.1 10E9/L (ref 0–0.2)
BASOPHILS NFR BLD AUTO: 0.8 %
BILIRUB DIRECT SERPL-MCNC: <0.1 MG/DL (ref 0–0.2)
BILIRUB SERPL-MCNC: 0.3 MG/DL (ref 0.2–1.3)
BUN SERPL-MCNC: 17 MG/DL (ref 7–30)
BUN SERPL-MCNC: 19 MG/DL (ref 7–30)
CA-I BLD-MCNC: 4.3 MG/DL (ref 4.4–5.2)
CALCIUM SERPL-MCNC: 8.1 MG/DL (ref 8.5–10.1)
CALCIUM SERPL-MCNC: 8.3 MG/DL (ref 8.5–10.1)
CHLORIDE SERPL-SCNC: 106 MMOL/L (ref 94–109)
CHLORIDE SERPL-SCNC: 108 MMOL/L (ref 94–109)
CO2 SERPL-SCNC: 27 MMOL/L (ref 20–32)
CO2 SERPL-SCNC: 27 MMOL/L (ref 20–32)
CREAT SERPL-MCNC: 0.98 MG/DL (ref 0.52–1.04)
CREAT SERPL-MCNC: 1.02 MG/DL (ref 0.52–1.04)
D DIMER PPP FEU-MCNC: 0.3 UG/ML FEU (ref 0–0.5)
DIFFERENTIAL METHOD BLD: NORMAL
EOSINOPHIL NFR BLD AUTO: 1.6 %
ERYTHROCYTE [DISTWIDTH] IN BLOOD BY AUTOMATED COUNT: 14.4 % (ref 10–15)
ERYTHROCYTE [DISTWIDTH] IN BLOOD BY AUTOMATED COUNT: 14.6 % (ref 10–15)
GFR SERPL CREATININE-BSD FRML MDRD: 57 ML/MIN/1.7M2
GFR SERPL CREATININE-BSD FRML MDRD: 59 ML/MIN/1.7M2
GLUCOSE SERPL-MCNC: 92 MG/DL (ref 70–99)
GLUCOSE SERPL-MCNC: 98 MG/DL (ref 70–99)
HCT VFR BLD AUTO: 37.6 % (ref 35–47)
HCT VFR BLD AUTO: 39.3 % (ref 35–47)
HGB BLD-MCNC: 12.3 G/DL (ref 11.7–15.7)
HGB BLD-MCNC: 12.7 G/DL (ref 11.7–15.7)
IMM GRANULOCYTES # BLD: 0 10E9/L (ref 0–0.4)
IMM GRANULOCYTES NFR BLD: 0.1 %
LIPASE SERPL-CCNC: 189 U/L (ref 73–393)
LYMPHOCYTES # BLD AUTO: 3.1 10E9/L (ref 0.8–5.3)
LYMPHOCYTES NFR BLD AUTO: 41.5 %
MAGNESIUM SERPL-MCNC: 2.1 MG/DL (ref 1.6–2.3)
MCH RBC QN AUTO: 28.3 PG (ref 26.5–33)
MCH RBC QN AUTO: 28.7 PG (ref 26.5–33)
MCHC RBC AUTO-ENTMCNC: 32.3 G/DL (ref 31.5–36.5)
MCHC RBC AUTO-ENTMCNC: 32.7 G/DL (ref 31.5–36.5)
MCV RBC AUTO: 87 FL (ref 78–100)
MCV RBC AUTO: 89 FL (ref 78–100)
MONOCYTES # BLD AUTO: 0.6 10E9/L (ref 0–1.3)
MONOCYTES NFR BLD AUTO: 7.8 %
NEUTROPHILS # BLD AUTO: 3.7 10E9/L (ref 1.6–8.3)
NEUTROPHILS NFR BLD AUTO: 48.2 %
NRBC # BLD AUTO: 0 10*3/UL
NRBC BLD AUTO-RTO: 0 /100
NT-PROBNP SERPL-MCNC: 23 PG/ML (ref 0–900)
NT-PROBNP SERPL-MCNC: 32 PG/ML (ref 0–900)
PHOSPHATE SERPL-MCNC: 3.1 MG/DL (ref 2.5–4.5)
PLATELET # BLD AUTO: 242 10E9/L (ref 150–450)
PLATELET # BLD AUTO: 248 10E9/L (ref 150–450)
POTASSIUM SERPL-SCNC: 3.7 MMOL/L (ref 3.4–5.3)
POTASSIUM SERPL-SCNC: 3.8 MMOL/L (ref 3.4–5.3)
PROT SERPL-MCNC: 7.9 G/DL (ref 6.8–8.8)
RBC # BLD AUTO: 4.34 10E12/L (ref 3.8–5.2)
RBC # BLD AUTO: 4.42 10E12/L (ref 3.8–5.2)
SODIUM SERPL-SCNC: 141 MMOL/L (ref 133–144)
SODIUM SERPL-SCNC: 142 MMOL/L (ref 133–144)
TROPONIN I SERPL-MCNC: <0.015 UG/L (ref 0–0.04)
WBC # BLD AUTO: 7.1 10E9/L (ref 4–11)
WBC # BLD AUTO: 7.6 10E9/L (ref 4–11)

## 2018-11-29 PROCEDURE — 76937 US GUIDE VASCULAR ACCESS: CPT | Mod: Z6 | Performed by: FAMILY MEDICINE

## 2018-11-29 PROCEDURE — 25000132 ZZH RX MED GY IP 250 OP 250 PS 637: Performed by: PHYSICIAN ASSISTANT

## 2018-11-29 PROCEDURE — 36415 COLL VENOUS BLD VENIPUNCTURE: CPT | Performed by: STUDENT IN AN ORGANIZED HEALTH CARE EDUCATION/TRAINING PROGRAM

## 2018-11-29 PROCEDURE — 96365 THER/PROPH/DIAG IV INF INIT: CPT | Performed by: FAMILY MEDICINE

## 2018-11-29 PROCEDURE — 96366 THER/PROPH/DIAG IV INF ADDON: CPT

## 2018-11-29 PROCEDURE — 76937 US GUIDE VASCULAR ACCESS: CPT | Performed by: FAMILY MEDICINE

## 2018-11-29 PROCEDURE — 36415 COLL VENOUS BLD VENIPUNCTURE: CPT | Performed by: PHYSICIAN ASSISTANT

## 2018-11-29 PROCEDURE — 71046 X-RAY EXAM CHEST 2 VIEWS: CPT | Mod: TC

## 2018-11-29 PROCEDURE — 99285 EMERGENCY DEPT VISIT HI MDM: CPT | Mod: 25 | Performed by: FAMILY MEDICINE

## 2018-11-29 PROCEDURE — 96376 TX/PRO/DX INJ SAME DRUG ADON: CPT | Performed by: FAMILY MEDICINE

## 2018-11-29 PROCEDURE — 80048 BASIC METABOLIC PNL TOTAL CA: CPT | Performed by: FAMILY MEDICINE

## 2018-11-29 PROCEDURE — 96375 TX/PRO/DX INJ NEW DRUG ADDON: CPT | Performed by: FAMILY MEDICINE

## 2018-11-29 PROCEDURE — 83690 ASSAY OF LIPASE: CPT | Performed by: FAMILY MEDICINE

## 2018-11-29 PROCEDURE — 80076 HEPATIC FUNCTION PANEL: CPT | Performed by: FAMILY MEDICINE

## 2018-11-29 PROCEDURE — 84484 ASSAY OF TROPONIN QUANT: CPT | Performed by: FAMILY MEDICINE

## 2018-11-29 PROCEDURE — 85025 COMPLETE CBC W/AUTO DIFF WBC: CPT | Performed by: FAMILY MEDICINE

## 2018-11-29 PROCEDURE — 85027 COMPLETE CBC AUTOMATED: CPT | Performed by: PHYSICIAN ASSISTANT

## 2018-11-29 PROCEDURE — 83880 ASSAY OF NATRIURETIC PEPTIDE: CPT | Performed by: FAMILY MEDICINE

## 2018-11-29 PROCEDURE — 99291 CRITICAL CARE FIRST HOUR: CPT | Mod: 25 | Performed by: FAMILY MEDICINE

## 2018-11-29 PROCEDURE — 85379 FIBRIN DEGRADATION QUANT: CPT | Performed by: FAMILY MEDICINE

## 2018-11-29 PROCEDURE — 96365 THER/PROPH/DIAG IV INF INIT: CPT

## 2018-11-29 PROCEDURE — G0378 HOSPITAL OBSERVATION PER HR: HCPCS

## 2018-11-29 PROCEDURE — 83880 ASSAY OF NATRIURETIC PEPTIDE: CPT | Performed by: PHYSICIAN ASSISTANT

## 2018-11-29 PROCEDURE — 25000132 ZZH RX MED GY IP 250 OP 250 PS 637: Performed by: FAMILY MEDICINE

## 2018-11-29 PROCEDURE — 99223 1ST HOSP IP/OBS HIGH 75: CPT | Mod: AI | Performed by: PHYSICIAN ASSISTANT

## 2018-11-29 PROCEDURE — 84100 ASSAY OF PHOSPHORUS: CPT | Performed by: FAMILY MEDICINE

## 2018-11-29 PROCEDURE — 83735 ASSAY OF MAGNESIUM: CPT | Performed by: FAMILY MEDICINE

## 2018-11-29 PROCEDURE — 80048 BASIC METABOLIC PNL TOTAL CA: CPT | Performed by: PHYSICIAN ASSISTANT

## 2018-11-29 PROCEDURE — 82330 ASSAY OF CALCIUM: CPT | Performed by: FAMILY MEDICINE

## 2018-11-29 PROCEDURE — 25000128 H RX IP 250 OP 636: Performed by: FAMILY MEDICINE

## 2018-11-29 PROCEDURE — 93005 ELECTROCARDIOGRAM TRACING: CPT | Performed by: FAMILY MEDICINE

## 2018-11-29 PROCEDURE — 84484 ASSAY OF TROPONIN QUANT: CPT | Mod: 91 | Performed by: STUDENT IN AN ORGANIZED HEALTH CARE EDUCATION/TRAINING PROGRAM

## 2018-11-29 PROCEDURE — 93010 ELECTROCARDIOGRAM REPORT: CPT | Mod: Z6 | Performed by: FAMILY MEDICINE

## 2018-11-29 PROCEDURE — 93306 TTE W/DOPPLER COMPLETE: CPT | Mod: 26 | Performed by: INTERNAL MEDICINE

## 2018-11-29 PROCEDURE — 25000128 H RX IP 250 OP 636: Performed by: PHYSICIAN ASSISTANT

## 2018-11-29 PROCEDURE — 84484 ASSAY OF TROPONIN QUANT: CPT | Performed by: PHYSICIAN ASSISTANT

## 2018-11-29 PROCEDURE — 93306 TTE W/DOPPLER COMPLETE: CPT

## 2018-11-29 RX ORDER — ALUMINA, MAGNESIA, AND SIMETHICONE 2400; 2400; 240 MG/30ML; MG/30ML; MG/30ML
30 SUSPENSION ORAL EVERY 4 HOURS PRN
Status: DISCONTINUED | OUTPATIENT
Start: 2018-11-29 | End: 2018-12-01 | Stop reason: HOSPADM

## 2018-11-29 RX ORDER — PROCHLORPERAZINE MALEATE 5 MG
10 TABLET ORAL EVERY 6 HOURS PRN
Status: DISCONTINUED | OUTPATIENT
Start: 2018-11-29 | End: 2018-12-01 | Stop reason: HOSPADM

## 2018-11-29 RX ORDER — CALCITRIOL 0.25 UG/1
0.25 CAPSULE, LIQUID FILLED ORAL 2 TIMES DAILY
Status: DISCONTINUED | OUTPATIENT
Start: 2018-11-29 | End: 2018-12-01 | Stop reason: HOSPADM

## 2018-11-29 RX ORDER — CLOPIDOGREL BISULFATE 75 MG/1
600 TABLET ORAL ONCE
Status: COMPLETED | OUTPATIENT
Start: 2018-11-29 | End: 2018-11-29

## 2018-11-29 RX ORDER — NITROGLYCERIN 0.4 MG/1
0.4 TABLET SUBLINGUAL EVERY 5 MIN PRN
Status: DISCONTINUED | OUTPATIENT
Start: 2018-11-29 | End: 2018-12-01 | Stop reason: HOSPADM

## 2018-11-29 RX ORDER — MORPHINE SULFATE 2 MG/ML
1-2 INJECTION, SOLUTION INTRAMUSCULAR; INTRAVENOUS EVERY 6 HOURS PRN
Status: DISCONTINUED | OUTPATIENT
Start: 2018-11-29 | End: 2018-12-01 | Stop reason: HOSPADM

## 2018-11-29 RX ORDER — CALCITRIOL 0.25 UG/1
0.25 CAPSULE, LIQUID FILLED ORAL DAILY
Status: DISCONTINUED | OUTPATIENT
Start: 2018-11-29 | End: 2018-11-29 | Stop reason: HOSPADM

## 2018-11-29 RX ORDER — ASPIRIN 81 MG/1
324 TABLET, CHEWABLE ORAL ONCE
Status: COMPLETED | OUTPATIENT
Start: 2018-11-29 | End: 2018-11-29

## 2018-11-29 RX ORDER — NITROGLYCERIN 0.4 MG/1
0.4 TABLET SUBLINGUAL EVERY 5 MIN PRN
Status: DISCONTINUED | OUTPATIENT
Start: 2018-11-29 | End: 2018-11-29 | Stop reason: HOSPADM

## 2018-11-29 RX ORDER — ONDANSETRON 2 MG/ML
4 INJECTION INTRAMUSCULAR; INTRAVENOUS EVERY 6 HOURS PRN
Status: DISCONTINUED | OUTPATIENT
Start: 2018-11-29 | End: 2018-12-01 | Stop reason: HOSPADM

## 2018-11-29 RX ORDER — ACETAMINOPHEN 325 MG/1
650 TABLET ORAL EVERY 4 HOURS PRN
Status: DISCONTINUED | OUTPATIENT
Start: 2018-11-29 | End: 2018-12-01 | Stop reason: HOSPADM

## 2018-11-29 RX ORDER — HEPARIN SODIUM 10000 [USP'U]/100ML
0-3500 INJECTION, SOLUTION INTRAVENOUS CONTINUOUS
Status: DISCONTINUED | OUTPATIENT
Start: 2018-11-29 | End: 2018-11-30

## 2018-11-29 RX ORDER — ASPIRIN 81 MG/1
81 TABLET ORAL DAILY
Status: DISCONTINUED | OUTPATIENT
Start: 2018-11-30 | End: 2018-11-30

## 2018-11-29 RX ORDER — ATORVASTATIN CALCIUM 40 MG/1
40 TABLET, FILM COATED ORAL EVERY EVENING
Status: DISCONTINUED | OUTPATIENT
Start: 2018-11-30 | End: 2018-11-30

## 2018-11-29 RX ORDER — LEVOTHYROXINE SODIUM 125 UG/1
125 TABLET ORAL
Status: DISCONTINUED | OUTPATIENT
Start: 2018-11-30 | End: 2018-11-29

## 2018-11-29 RX ORDER — ONDANSETRON 4 MG/1
4 TABLET, ORALLY DISINTEGRATING ORAL EVERY 6 HOURS PRN
Status: DISCONTINUED | OUTPATIENT
Start: 2018-11-29 | End: 2018-12-01 | Stop reason: HOSPADM

## 2018-11-29 RX ORDER — LIDOCAINE 40 MG/G
CREAM TOPICAL
Status: DISCONTINUED | OUTPATIENT
Start: 2018-11-29 | End: 2018-12-01 | Stop reason: HOSPADM

## 2018-11-29 RX ORDER — AMOXICILLIN 250 MG
1 CAPSULE ORAL 2 TIMES DAILY
Status: DISCONTINUED | OUTPATIENT
Start: 2018-11-29 | End: 2018-12-01 | Stop reason: HOSPADM

## 2018-11-29 RX ORDER — MAGNESIUM OXIDE 400 MG/1
400 TABLET ORAL DAILY
Status: DISCONTINUED | OUTPATIENT
Start: 2018-11-29 | End: 2018-11-29 | Stop reason: HOSPADM

## 2018-11-29 RX ORDER — HEPARIN SODIUM 10000 [USP'U]/100ML
0-3500 INJECTION, SOLUTION INTRAVENOUS CONTINUOUS
Status: DISCONTINUED | OUTPATIENT
Start: 2018-11-29 | End: 2018-11-29 | Stop reason: HOSPADM

## 2018-11-29 RX ORDER — LEVOTHYROXINE SODIUM 125 UG/1
125 TABLET ORAL
Status: DISCONTINUED | OUTPATIENT
Start: 2018-11-30 | End: 2018-12-01 | Stop reason: HOSPADM

## 2018-11-29 RX ORDER — ONDANSETRON 2 MG/ML
4 INJECTION INTRAMUSCULAR; INTRAVENOUS EVERY 30 MIN PRN
Status: DISCONTINUED | OUTPATIENT
Start: 2018-11-29 | End: 2018-11-29 | Stop reason: HOSPADM

## 2018-11-29 RX ORDER — AMOXICILLIN 250 MG
2 CAPSULE ORAL 2 TIMES DAILY
Status: DISCONTINUED | OUTPATIENT
Start: 2018-11-29 | End: 2018-12-01 | Stop reason: HOSPADM

## 2018-11-29 RX ORDER — NALOXONE HYDROCHLORIDE 0.4 MG/ML
.1-.4 INJECTION, SOLUTION INTRAMUSCULAR; INTRAVENOUS; SUBCUTANEOUS
Status: DISCONTINUED | OUTPATIENT
Start: 2018-11-29 | End: 2018-12-01 | Stop reason: HOSPADM

## 2018-11-29 RX ORDER — PROCHLORPERAZINE 25 MG
25 SUPPOSITORY, RECTAL RECTAL EVERY 12 HOURS PRN
Status: DISCONTINUED | OUTPATIENT
Start: 2018-11-29 | End: 2018-12-01 | Stop reason: HOSPADM

## 2018-11-29 RX ORDER — ACETAMINOPHEN 650 MG/1
650 SUPPOSITORY RECTAL EVERY 4 HOURS PRN
Status: DISCONTINUED | OUTPATIENT
Start: 2018-11-29 | End: 2018-12-01 | Stop reason: HOSPADM

## 2018-11-29 RX ADMIN — NITROGLYCERIN 0.4 MG: 0.4 TABLET SUBLINGUAL at 10:55

## 2018-11-29 RX ADMIN — HEPARIN SODIUM 1050 UNITS/HR: 10000 INJECTION, SOLUTION INTRAVENOUS at 11:42

## 2018-11-29 RX ADMIN — CALCITRIOL 0.25 MCG: 0.25 CAPSULE, LIQUID FILLED ORAL at 12:11

## 2018-11-29 RX ADMIN — MAGNESIUM OXIDE TAB 400 MG (241.3 MG ELEMENTAL MG) 400 MG: 400 (241.3 MG) TAB at 12:11

## 2018-11-29 RX ADMIN — ASPIRIN 81 MG 324 MG: 81 TABLET ORAL at 08:39

## 2018-11-29 RX ADMIN — HEPARIN SODIUM 1550 UNITS/HR: 10000 INJECTION, SOLUTION INTRAVENOUS at 14:46

## 2018-11-29 RX ADMIN — ACETAMINOPHEN 650 MG: 325 TABLET, FILM COATED ORAL at 15:20

## 2018-11-29 RX ADMIN — ONDANSETRON HYDROCHLORIDE 4 MG: 2 INJECTION, SOLUTION INTRAMUSCULAR; INTRAVENOUS at 12:21

## 2018-11-29 RX ADMIN — ACETAMINOPHEN 650 MG: 325 TABLET, FILM COATED ORAL at 20:03

## 2018-11-29 RX ADMIN — CLOPIDOGREL 600 MG: 75 TABLET, FILM COATED ORAL at 11:39

## 2018-11-29 RX ADMIN — NITROGLYCERIN 0.4 MG: 0.4 TABLET SUBLINGUAL at 11:04

## 2018-11-29 RX ADMIN — METOPROLOL TARTRATE 12.5 MG: 25 TABLET ORAL at 20:03

## 2018-11-29 ASSESSMENT — PAIN DESCRIPTION - DESCRIPTORS: DESCRIPTORS: PRESSURE;TIGHTNESS

## 2018-11-29 NOTE — LETTER
December 1, 2018      TO: Radha Mcdermott  [address] [facility] [address]         To Whom It May Concern,      Radha Mcdermott was recently cared for at the [facility]. Due to her care, she is unable to use her right wrist for any strenuous or repetitive motions for 5-7 days which may limit what she is capable of doing at work.                  It was a pleasure to see you at your last clinic visit. Please do not hesitate to call me if you have any questions or concerns.    Sincerely,      Marc RODRIGUEZ

## 2018-11-29 NOTE — PLAN OF CARE
"Problem: Patient Care Overview  Goal: Plan of Care/Patient Progress Review  Outcome: No Change  D:Patient arrived on unit fully awake and alert.   Reports her pain is \"coming back\"  And rates pain a \"5-6 out of 10\".   Describes pain as if someone is \"sitting on my chest  And squeezing\" and across left back scapula area.  Also,  Reports pain worsens with a \"taking a deep breath\".   MD on CSI,  Connor,  Was informed of patient's symptoms.    Heart Echo completed.   Started on heparin drip as ordered.   Reported head ache from \"the NTG\".   Given tylenol po.  Rhythm is NSR with  No ectopy.  HR 78, RR 18  Bp 116/55 MAP 76.   Temp 99.0   NAY.   Denies numbness or tingling of upper or lower extremities.   Facial muscle is symmetric.  No change in sensation.   Strength in upper and lower extemities is strong and equal..      A:Is in no acute distress on arrival from outside hospital.   Rhythm is Stable.   AVSS.  Breathing easy with normal sats on room air.  Offered pain medication and patient declined,   Stating \"I am OK right now\".   Condition is stable.    :P:Continue to assess level of comfort.   Medicate prn pain.  Notify MD with chest pain or angina equivalent.  Monitor rhythm and vital signs.  Monitor O2 sats.   Continue with current medical plan of care.      "

## 2018-11-29 NOTE — H&P
"CARDIOLOGY-CSI HISTORY AND PHYSICAL NOTE  Radha Mcdermott MRN:[id number] YOB: 1965  Date of Admission:11/29/2018    ASSESSMENT AND PLAN:   53 year old female without significant cardiac risk factors who is admitted with 3 days of chest pain and EKG changes. Her workup is remarkable for a negative troponin x 1, EKG with non-specific ST Depressions with biphasic TWI in V2-V3 (?Wellen's). She is transferred to [facility] for further workup for unstable angina.     Unstable Angina   Chest pain with EKG changes  54 yo female with no prior cardiovascular disease and no cardiac risk factors other than remote history of tobacco use who is sent from OSH ER for chest pain 3 days in duration, constant, across her anterior chest, \"6/10\" growing in severity and now migrating into her left upper extremity. She also endorses feeling \"fatigued\" for one week and occasional MENJIVAR. EKG in the ER showed ST depression in her precordial leads V2-V6 though these may be more pronounced given her biphasic TW, ST depression in II - concern for unstable angina and possible Wellen's sign on EKG. Her chest pain has atypical features as well in that it may be positional with lying supine and is pleuritic in nature. She also has occasional left lower extremity swelling and cramping though a d-dimer is 0.30 and she is oxygenating on room air. VS are stable. Hgb a1c within 3 mo 5.5. TTE is normal, normal LV EF 55-60%, normal RV size/function, no significant valvular disease.    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 No history of HTN, HLD, DM, or family history of premature MI or death. She otherwise has a history of papillary carcinoma of the thyroid and is status post thyroidectomy in 2005 and stable long nodules since 2013.     Radha describes that for the last 3 days, she has had constant pain across her anterior chest that has been waxing and waning in severity but always present. Yesterday her pain spread into her left shoulder, and today into her left arm. She continues that there has been no clear exacerbating factor but suspects that with exertion such as her typical job duties, her symptoms are worse and she has noted associated shortness of breath. Additionally, she notes that lying supine may make her chest pain worse. Yesterday she had one episode of light headedness without syncope while sitting at work. This prompted her to seek out care at the ER where she had normal lab-work including a negative troponin x 1. She had an EKG that showed non-specific ST depressions. She was given 324 mg ASA, started on a heparin gtt, and transferred to [facility] for further workup and consideration of unstable angina. She did receive SL nitroglycerin x 2 without improvement in her symptoms and this dropped her BP. A CXR prior showed nodular densities in both lung zones without other finding; she does have a history of lung nodules.    She notes her pain is presently \"6/10\" but tolerable. When prompted, she endorses occasional left lower extremity unilateral calf tenderness and swelling without prior workup that usually resolves spontaneously; no recent travel. She has stable VS and is oxygenating on RA. A d-dimer at the ER " "PTA was 0.3. To her knowledge, she has not had a DVT or PE in the past.  She says one week ago she may have started feeling \"more tired,\" but no specific symptoms of fever, chills, cough, congestion, URI. No abdominal pain, nausea, vomiting, diarrhea. No unexplained weight gain/weight loss. No syncope. No palpitations.     PAST MEDICAL HISTORY:  Reviewed with patient on 11/29/2018   Past Medical History:   Diagnosis Date     Abnormal Papanicolaou smear of cervix and cervical HPV 12/03    ASCUS - repeat paps all ok.      Depressive disorder, not elsewhere classified      Dysmenorrhea      Fracture of arm age 14    right     Gestational diabetes      Papillary thyroid carcinoma (H) 10/05    5 cm, MF, + ETE, node+; 3 operations, 131I     Postsurgical hypoparathyroidism (H) 2005     Postsurgical hypothyroidism 2005     Unspecified contraceptive management        Past Surgical History:   Procedure Laterality Date     BIOPSY/REMOVAL, LYMPH NODE(S)  10/27/2009    Surgical resection of lymph nodes, neck.  Scar revision. [facility].     C THYROIDECTOMY  10/07/2005    Total thyroidectomy.  [facility]. Further lymph node resection in 12/06     HC BIOPSY/EXCISION LYMPH NODE OPEN SUPERFICIAL  2007    removal of some more lymph nodes in the neck.       REMOVE TONSILS/ADENOIDS,12+ Y/O  1984        MEDICATIONS:  PTA Meds  Prior to Admission medications    Medication Sig Last Dose Taking? Auth Provider   calcitRIOL (ROCALTROL) 0.25 MCG capsule TAKE ONE CAPSULE BY MOUTH TWICE A DAY 11/29/2018 at Unknown time Yes Vero Arevalo MD   docusate sodium 100 MG tablet Take 100 mg by mouth 2 times daily as needed for constipation 11/29/2018 at Unknown time Yes Jenna Rey MD   EPINEPHrine (EPIPEN) 0.3 MG/0.3ML injection Inject 0.3 mg into the muscle once as needed for anaphylaxis 11/29/2018 at Unknown time Yes Reported, Patient   magnesium oxide (MAG-OX) 400 MG tablet TAKE ONE-HALF TABLET BY MOUTH TWICE A DAY 11/29/2018 at " Unknown time Yes Vero Arevalo MD   SYNTHROID 125 MCG tablet Take 1 tablet (125 mcg) by mouth daily 11/29/2018 at Unknown time Yes Vero Arevalo MD      Current Meds    [START ON 11/30/2018] aspirin  81 mg Oral Daily     calcitRIOL  0.25 mcg Oral BID     metoprolol tartrate  12.5 mg Oral BID     senna-docusate  1 tablet Oral BID    Or     senna-docusate  2 tablet Oral BID     sodium chloride (PF)  3 mL Intracatheter Q8H     [START ON 11/30/2018] levothyroxine  125 mcg Oral QAM AC     Infusion Meds    HEParin 1,550 Units/hr (11/29/18 1446)     - MEDICATION INSTRUCTIONS -       - MEDICATION INSTRUCTIONS -         ALLERGIES:    Allergies   Allergen Reactions     Bees      Chicken Allergy Swelling     Meat Extract Swelling     No Known Drug Allergies        REVIEW OF SYSTEMS:  A 10 point review of systems was negative except as noted above.    SOCIAL HISTORY:   Social History     Social History     Marital status:      Spouse name: Mushtaq     Number of children: 2     Years of education: 12+     Occupational History     Pharm. [employer]     Social History Main Topics     Smoking status: Former Smoker     Packs/day: 0.50     Years: 15.00     Quit date: 3/1/1998     Smokeless tobacco: Never Used      Comment: no smoking in the home     Alcohol use Yes      Comment: social     Drug use: No     Sexual activity: Yes     Partners: Male     Birth control/ protection: None     Other Topics Concern      Service No     Blood Transfusions No     Caffeine Concern No     occasional     Occupational Exposure No     Hobby Hazards No     Sleep Concern No     Stress Concern No     Weight Concern No     Special Diet No     Back Care No     Exercise Yes     Bike Helmet No     Seat Belt Yes     Self-Exams Yes     Social History Narrative    Lives with son.         FAMILY MEDICAL HISTORY:   Family History   Problem Relation Age of Onset     Lipids Mother      Osteoporosis Mother      Cancer Father  "     skin     Cancer Paternal Grandfather      prostate cancer     Alzheimer Disease Paternal Grandmother      Heart Disease Paternal Grandmother      Cerebrovascular Disease Paternal Grandmother      Thyroid Disease Paternal Grandmother      Diabetes Maternal Grandmother      Thyroid Disease Maternal Grandmother      thyroid disease     Allergies Son      pcn     Cancer Maternal Aunt      breast - mid-30s         PHYSICAL EXAM:   Temp  Av.9  F (36.6  C)  Min: 97.4  F (36.3  C)  Max: 98.3  F (36.8  C)      Pulse  Av.5  Min: 66  Max: 93 Resp  Av.1  Min: 4  Max: 27  SpO2  Av.1 %  Min: 93 %  Max: 98 %       /55 (BP Location: Right arm)  Temp 99  F (37.2  C) (Oral)  Resp 20  Ht 1.676 m (5' 6\")  Wt 86.9 kg (191 lb 8 oz)  SpO2 94%  BMI 30.91 kg/m2       GENERAL APPEARANCE: Alert and NAD  Head: NC/AT  EYES: PERRL, pupils equal  HENT: Mouth without ulcers or lesions  NECK: Supple, trachea midline  Pulmonary: Lungs clear to auscultation with equal breath sounds bilaterally, no clubbing or cyanosis  CV: Regular rhythm, normal rate, no rub. JVP flat  GI: Soft, nontender, normal bowel sounds, no HSM   MS: No evidence of inflammation in joints, no muscle tenderness  SKIN: No rash, warm, dry  NEURO: Mentation intact and speech normal.     LABS:   CMP  Recent Labs  Lab 18  0921      POTASSIUM 3.7   CHLORIDE 106   CO2 27   ANIONGAP 9   GLC 98   BUN 19   CR 1.02   GFRESTIMATED 57*   GFRESTBLACK 69   PACO 8.1*   MAG 2.1   PHOS 3.1   PROTTOTAL 7.9   ALBUMIN 3.8   BILITOTAL 0.3   ALKPHOS 71   AST 17   ALT 29     CBC  Recent Labs  Lab 18  0921   HGB 12.3   WBC 7.6   RBC 4.34   HCT 37.6   MCV 87   MCH 28.3   MCHC 32.7   RDW 14.4        INRNo lab results found in last 7 days.  ABGNo lab results found in last 7 days.     IMAGING:  TTE:   Left ventricular size and systolic function is normal.The EF is 55-60%. No  diagnostic wall motion abnormality.  Right ventricular function, chamber " size, wall motion, and thickness are  normal.  No significant valvular abnormalities.  IVC is normal in size 2.0 cm.  No pericardial effusion is present.    CXR OSH:  Faint nodular densities are seen in both lung zones which  are presumably due to granulomatous disease. No infiltrates or  effusions are present. Heart size and pulmonary vasculature are  normal.    Patient will be officially staffed and discussed with Dr. Garrido tomorrow.     ARINA Montes  CSI  Pager: [phone]

## 2018-11-29 NOTE — ED TRIAGE NOTES
Pt arrives with mid chest pain, worse with deep breath. Has had since Monday. Today it started going down her left arm.

## 2018-11-29 NOTE — IP AVS SNAPSHOT
[address] [address]    Phone:  [phone]                                       After Visit Summary   11/29/2018    Radha Mcdermott    MRN: [id number]           After Visit Summary Signature Page     I have received my discharge instructions, and my questions have been answered. I have discussed any challenges I see with this plan with the nurse or doctor.    ..........................................................................................................................................  Patient/Patient Representative Signature      ..........................................................................................................................................  Patient Representative Print Name and Relationship to Patient    ..................................................               ................................................  Date                                   Time    ..........................................................................................................................................  Reviewed by Signature/Title    ...................................................              ..............................................  Date                                               Time          22EPIC Rev 08/18         Head injury

## 2018-11-29 NOTE — IP AVS SNAPSHOT
MRN:[id number]                      After Visit Summary   11/29/2018    Radha Mcdermott    MRN: [id number]           Thank you!     Thank you for choosing [facility] for your care. Our goal is always to provide you with excellent care. Hearing back from our patients is one way we can continue to improve our services. Please take a few minutes to complete the written survey that you may receive in the mail after you visit with us. Thank you!        Patient Information     Date Of Birth          1965        About your hospital stay     You were admitted on:  November 29, 2018 You last received care in the:  [address] [facility]    You were discharged on:  December 1, 2018        Reason for your hospital stay       53 year old female who was admitted with chest pain and EKG changes. She had a coronary angiogram. Her pain had subsided and she was in an improved condition at the time of discharge. She was also noted to have TSH 7.49.    Please follow up with your endocrinologist as able and have them review your chest x-ray findings and TSH here.   Please follow up with cardiology as arranged                  Who to Call     For medical emergencies, please call 911.  For non-urgent questions about your medical care, please call your primary care provider or clinic, [phone]          Attending Provider     Provider Specialty    Mainor Garrido MD Cardiology       Primary Care Provider Office Phone # Fax #    Jenna Mignon Rey [phone]      After Care Instructions     Activity       Your activity upon discharge: activity as tolerated.    IF coronary angiogram:   Groin wound: No heavy lifting (greater than 20 pounds) for 2 weeks.   Wrist wound: No excessive use of your wrist where access for the case was obtained.   No driving for 3 days.            Diet       Follow this diet upon discharge: Orders Placed This Encounter  Regular adult diet                 "  Follow-up Appointments     Adult [facility] Follow-up and recommended labs and tests       Follow up with primary care provider, Jenna Rey, within 7 days for hospital follow- up.  The following labs/tests are recommended: CBC, BMP, TSH with T4.    Follow up with your endocrinologist as able     Follow up with cardiology as arranged   Appointments on [place] and/or [facility] (with [facility] or [facility] provider or service). Call [phone] if you haven't heard regarding these appointments within 7 days of discharge.                  Future tests that were ordered for you     TSH with free T4 reflex       Last Lab Result: TSH (mU/L)       Date                     Value                 11/30/2018               7.49 (H)         ----------            TSH with free T4 reflex       Last Lab Result: TSH (mU/L)       Date                     Value                 11/30/2018               7.49 (H)         ----------                  Pending Results     Date and Time Order Name Status Description    11/30/2018 0800 EKG 12-lead, complete Preliminary             Statement of Approval     Ordered          11/30/18 2035  I have reviewed and agree with all the recommendations and orders detailed in this document.  EFFECTIVE NOW     Approved and electronically signed by:  Elzbieta Muniz MD             Admission Information     Date & Time Provider Department Dept. Phone    11/29/2018 Mainor Garrido MD [address] [facility] [phone]      Your Vitals Were     Blood Pressure Pulse Temperature Respirations Height Weight    103/42 (BP Location: Left arm) 62 97.9  F (36.6  C) (Oral) 18 1.676 m (5' 6\") 86.2 kg (190 lb 1.6 oz)    Pulse Oximetry BMI (Body Mass Index)                95% 30.68 kg/m2          MyChart Information     [employer] gives you secure access to your electronic health record. If you see a primary care provider, you can also send messages to your care team and make appointments. If " you have questions, please call your primary care clinic.  If you do not have a primary care provider, please call [phone] and they will assist you.        Care EveryWhere ID     This is your Care EveryWhere ID. This could be used by other organizations to access your [facility] medical records  [id number]        Equal Access to Services     RIDDHISANDRA FAYE : Miguel Gaytan, waaxda luqadaha, qaybta kaalmagil sanchez, harvey montes. So [facility] [phone].    ATENCIÓN: Si habla español, tiene a campos disposición servicios gratuitos de asistencia lingüística. [facility] [phone].    We comply with applicable federal civil rights laws and [place] laws. We do not discriminate on the basis of race, color, national origin, age, disability, sex, sexual orientation, or gender identity.               Review of your medicines      CONTINUE these medicines which may have CHANGED, or have new prescriptions. If we are uncertain of the size of tablets/capsules you have at home, strength may be listed as something that might have changed.        Dose / Directions    magnesium oxide 400 MG tablet   Commonly known as:  MAG-OX   This may have changed:  See the new instructions.   Used for:  Papillary thyroid carcinoma (H), Post-surgical hypoparathyroidism (H), Postsurgical hypothyroidism        TAKE ONE-HALF TABLET BY MOUTH TWICE A DAY   Quantity:  90 tablet   Refills:  2         CONTINUE these medicines which have NOT CHANGED        Dose / Directions    calcitRIOL 0.25 MCG capsule   Commonly known as:  ROCALTROL   Used for:  Other hypoparathyroidism (H)        TAKE ONE CAPSULE BY MOUTH TWICE A DAY   Quantity:  180 capsule   Refills:  2       docusate sodium 100 MG tablet   Commonly known as:  COLACE   Used for:  External hemorrhoids        Dose:  100 mg   Take 100 mg by mouth 2 times daily as needed for constipation   Quantity:  60 tablet   Refills:  11       EPINEPHrine 0.3 MG/0.3ML  injection 2-pack   Commonly known as:  EPIPEN/ADRENACLICK/or ANY BX GENERIC EQUIV        Dose:  0.3 mg   Inject 0.3 mg into the muscle once as needed for anaphylaxis   Refills:  0       SYNTHROID 125 MCG tablet   Used for:  Malignant neoplasm of thyroid gland (H), Postsurgical hypothyroidism   Generic drug:  levothyroxine        Dose:  125 mcg   Take 1 tablet (125 mcg) by mouth daily   Quantity:  90 tablet   Refills:  0                Protect others around you: Learn how to safely use, store and throw away your medicines at www.disposemymeds.org.             Medication List: This is a list of all your medications and when to take them. Check marks below indicate your daily home schedule. Keep this list as a reference.      Medications           Morning Afternoon Evening Bedtime As Needed    calcitRIOL 0.25 MCG capsule   Commonly known as:  ROCALTROL   TAKE ONE CAPSULE BY MOUTH TWICE A DAY   Last time this was given:  0.25 mcg on 12/1/2018  7:55 AM                                docusate sodium 100 MG tablet   Commonly known as:  COLACE   Take 100 mg by mouth 2 times daily as needed for constipation                                EPINEPHrine 0.3 MG/0.3ML injection 2-pack   Commonly known as:  EPIPEN/ADRENACLICK/or ANY BX GENERIC EQUIV   Inject 0.3 mg into the muscle once as needed for anaphylaxis                                magnesium oxide 400 MG tablet   Commonly known as:  MAG-OX   TAKE ONE-HALF TABLET BY MOUTH TWICE A DAY                                SYNTHROID 125 MCG tablet   Take 1 tablet (125 mcg) by mouth daily   Last time this was given:  125 mcg on 12/1/2018  7:56 AM   Generic drug:  levothyroxine

## 2018-11-29 NOTE — ED NOTES
"Diagnosis: Chest pain-Unstable angina  Medical history: Hypocalcemia, Post surgical Hypothyroid, Post surgical Hypo Parathyroid  EKG/Tele:Sinus  Rhythm -Nonspecific ST depression   +   Diffuse nonspecific T-abnormality  -Nondiagnostic. ABNORMAL    Vitals: Stable  Abnormal Labs: Results for MELONY LOPEZ (MRN [id number]) as of 11/29/2018 11:00   Ref. Range 11/29/2018 09:21   Calcium Latest Ref Range: 8.5 - 10.1 mg/dL 8.1 (L)     Imaging: Chest x-ray;IMPRESSION: Faint nodular densities are seen in both lung zones which  are presumably due to granulomatous disease. No infiltrates or  effusions are present. Heart size and pulmonary vasculature are  normal.  Meds Given: 2 \"nitroglycerin\" without relief, ASA, Plavix and Heparin   Pain: 6  Skin Integrity: Intact  Belongings list done.  Orders completed  Report given to:     "

## 2018-11-29 NOTE — ED PROVIDER NOTES
History     Chief Complaint   Patient presents with     Chest Pain     HPI  Radha Mcdermott is a 53 year old female who presents with chest pain this been going on for the past 3 days.  The pain has been pretty much constant.  The pain initially started more in the right back area that radiated around to her chest area.  This morning she noticed it more in the left side of her back and going down her left arm.  Patient states the pain is there all the time, taking a deep breath makes the pain worse.  Activity also seems to make the pain a little bit worse.  Patient has not had pain like this before.  Patient has felt more short of breath here in the last few days.  Patient denies any URI-like symptoms including: Fever, chills, cough or sore throat.  Patient's cardiac risk factors are only: Obesity    Problem List:    Patient Active Problem List    Diagnosis Date Noted     Health Care Home 08/10/2011     Priority: High     X  DX V65.8 REPLACED WITH 01401 [facility]ALTH CARE HOME (04/08/2013)       Class 1 obesity with serious comorbidity and body mass index (BMI) of 32.0 to 32.9 in adult, unspecified obesity type 09/25/2018     Priority: Medium     Cystocele, midline 02/09/2017     Priority: Medium     Major depressive disorder, recurrent episode, mild (H) 05/10/2016     Priority: Medium     Shoulder joint pain 04/08/2015     Priority: Medium     Abdominal mass 09/24/2013     Priority: Medium     Pulmonary nodules 11/12/2012     Priority: Medium     Stable right pulmonary nodule, likely benign. New left subpleural nodule in 6/12, repeat CT scan in 3/13.         CARDIOVASCULAR SCREENING; LDL GOAL LESS THAN 160 10/31/2010     Priority: Medium     Iron deficiency anemia 05/21/2009     Priority: Medium     Anxiety state 10/16/2007     Priority: Medium     Problem list name updated by automated process. Provider to review       Hypocalcemia 10/14/2005     Priority: Medium     Disorder of magnesium metabolism 10/14/2005      Priority: Medium     Problem list name updated by automated process. Provider to review       Post-surgical hypoparathyroidism (H) 10/07/2005     Priority: Medium     Papillary thyroid carcinoma (H) 10/07/2005     Priority: Medium     Postsurgical hypothyroidism 10/07/2005     Priority: Medium     Dysmenorrhea      Priority: Medium     Contraceptive management      Priority: Medium     Problem list name updated by automated process. Provider to review          Past Medical History:    Past Medical History:   Diagnosis Date     Abnormal Papanicolaou smear of cervix and cervical HPV 12/03     Depressive disorder, not elsewhere classified      Dysmenorrhea      Fracture of arm age 14     Gestational diabetes      Papillary thyroid carcinoma (H) 10/05     Postsurgical hypoparathyroidism (H) 2005     Postsurgical hypothyroidism 2005     Unspecified contraceptive management        Past Surgical History:    Past Surgical History:   Procedure Laterality Date     BIOPSY/REMOVAL, LYMPH NODE(S)  10/27/2009    Surgical resection of lymph nodes, neck.  Scar revision. [facility].     C THYROIDECTOMY  10/07/2005    Total thyroidectomy.  [facility]. Further lymph node resection in 12/06     HC BIOPSY/EXCISION LYMPH NODE OPEN SUPERFICIAL  2007    removal of some more lymph nodes in the neck.       REMOVE TONSILS/ADENOIDS,14+ Y/O  1984       Family History:    Family History   Problem Relation Age of Onset     Lipids Mother      Osteoporosis Mother      Cancer Father      skin     Cancer Paternal Grandfather      prostate cancer     Alzheimer Disease Paternal Grandmother      Heart Disease Paternal Grandmother      Cerebrovascular Disease Paternal Grandmother      Thyroid Disease Paternal Grandmother      Diabetes Maternal Grandmother      Thyroid Disease Maternal Grandmother      thyroid disease     Allergies Son      pcn     Cancer Maternal Aunt      breast - mid-30s       Social History:  Marital Status:   [2]  Social History    Substance Use Topics     Smoking status: Former Smoker     Packs/day: 0.50     Years: 15.00     Quit date: 3/1/1998     Smokeless tobacco: Never Used      Comment: no smoking in the home     Alcohol use Yes      Comment: social        Medications:      calcitRIOL (ROCALTROL) 0.25 MCG capsule   docusate sodium 100 MG tablet   EPINEPHrine (EPIPEN) 0.3 MG/0.3ML injection   magnesium oxide (MAG-OX) 400 MG tablet   multivitamin, therapeutic (THERA-VIT) TABS   SYNTHROID 125 MCG tablet         Review of Systems   All other systems reviewed and are negative.      Physical Exam   BP: (!) 142/99  Pulse: 93  Temp: 97.4  F (36.3  C)  Resp: 12  SpO2: 97 %      Physical Exam   Constitutional: She is oriented to person, place, and time. She appears well-developed and well-nourished. No distress.   HENT:   Mouth/Throat: Oropharynx is clear and moist.   Eyes: Conjunctivae are normal.   Neck: Normal range of motion. Neck supple.   Cardiovascular: Normal rate, regular rhythm, normal heart sounds and intact distal pulses.  Exam reveals no gallop and no friction rub.    No murmur heard.  Pulmonary/Chest: Effort normal and breath sounds normal. No respiratory distress. She has no wheezes. She has no rales. She exhibits no tenderness.   Abdominal: Soft. Bowel sounds are normal. She exhibits no distension and no mass. There is no tenderness. There is no guarding.   Musculoskeletal: Normal range of motion. She exhibits no edema or tenderness.   Neurological: She is alert and oriented to person, place, and time.   Skin: Skin is warm and dry. No rash noted. She is not diaphoretic.   Psychiatric: She has a normal mood and affect. Judgment normal.   Nursing note and vitals reviewed.      ED Course     ED Course     Procedures               EKG Interpretation:      Interpreted by Rashid Dumont  Time reviewed: now  Symptoms at time of EKG: None   Rhythm: normal sinus   Rate: Normal  Axis: Normal  Ectopy: none  Conduction: normal  ST  Segments/ T Waves: ST Segment Depression V3, V4, V5 and V6 and T wave inversion II, III, aVF, V2, V3, V4, V5 and V6  Q Waves: none  Comparison to prior: New from 2007    Clinical Impression: heart strain and non-specific EKG        Critical Care time:  was 30 minutes for this patient excluding procedures.      Results for orders placed or performed during the hospital encounter of 11/29/18 (from the past 24 hour(s))   CBC with platelets differential   Result Value Ref Range    WBC 7.6 4.0 - 11.0 10e9/L    RBC Count 4.34 3.8 - 5.2 10e12/L    Hemoglobin 12.3 11.7 - 15.7 g/dL    Hematocrit 37.6 35.0 - 47.0 %    MCV 87 78 - 100 fl    MCH 28.3 26.5 - 33.0 pg    MCHC 32.7 31.5 - 36.5 g/dL    RDW 14.4 10.0 - 15.0 %    Platelet Count 248 150 - 450 10e9/L    Diff Method Automated Method     % Neutrophils 48.2 %    % Lymphocytes 41.5 %    % Monocytes 7.8 %    % Eosinophils 1.6 %    % Basophils 0.8 %    % Immature Granulocytes 0.1 %    Nucleated RBCs 0 0 /100    Absolute Neutrophil 3.7 1.6 - 8.3 10e9/L    Absolute Lymphocytes 3.1 0.8 - 5.3 10e9/L    Absolute Monocytes 0.6 0.0 - 1.3 10e9/L    Absolute Basophils 0.1 0.0 - 0.2 10e9/L    Abs Immature Granulocytes 0.0 0 - 0.4 10e9/L    Absolute Nucleated RBC 0.0    Hepatic panel   Result Value Ref Range    Bilirubin Direct <0.1 0.0 - 0.2 mg/dL    Bilirubin Total 0.3 0.2 - 1.3 mg/dL    Albumin 3.8 3.4 - 5.0 g/dL    Protein Total 7.9 6.8 - 8.8 g/dL    Alkaline Phosphatase 71 40 - 150 U/L    ALT 29 0 - 50 U/L    AST 17 0 - 45 U/L   Lipase   Result Value Ref Range    Lipase 189 73 - 393 U/L   D dimer quantitative   Result Value Ref Range    D Dimer 0.3 0.0 - 0.50 ug/ml FEU   Nt probnp inpatient (BNP)   Result Value Ref Range    N-Terminal Pro BNP Inpatient 23 0 - 900 pg/mL   Magnesium   Result Value Ref Range    Magnesium 2.1 1.6 - 2.3 mg/dL   Calcium ionized whole blood   Result Value Ref Range    Calcium Ionized Whole Blood 4.3 (L) 4.4 - 5.2 mg/dL   Phosphorus   Result Value Ref  Range    Phosphorus 3.1 2.5 - 4.5 mg/dL   Basic metabolic panel   Result Value Ref Range    Sodium 142 133 - 144 mmol/L    Potassium 3.7 3.4 - 5.3 mmol/L    Chloride 106 94 - 109 mmol/L    Carbon Dioxide 27 20 - 32 mmol/L    Anion Gap 9 3 - 14 mmol/L    Glucose 98 70 - 99 mg/dL    Urea Nitrogen 19 7 - 30 mg/dL    Creatinine 1.02 0.52 - 1.04 mg/dL    GFR Estimate 57 (L) >60 mL/min/1.7m2    GFR Estimate If Black 69 >60 mL/min/1.7m2    Calcium 8.1 (L) 8.5 - 10.1 mg/dL   Troponin I   Result Value Ref Range    Troponin I ES <0.015 0.000 - 0.045 ug/L   XR Chest 2 Views    Narrative    CHEST TWO VIEWS  11/29/2018 10:29 AM     HISTORY:  Chest pain.     COMPARISON: Chest CT dated 6/7/2016 and plain radiograph dated  9/12/2005.      Impression    IMPRESSION: Faint nodular densities are seen in both lung zones which  are presumably due to granulomatous disease. No infiltrates or  effusions are present. Heart size and pulmonary vasculature are  normal.    TRICE SHANE MD       Medications   aspirin (ASA) chewable tablet 324 mg (324 mg Oral Given 11/29/18 0839)     9:19 AM: Nursing is tried multiple times and has been unable to establish an IV or get blood.  I was called into the room to do ultrasound guidance for peripheral IV start and was successful as noted below.  Labs are now sent and waiting for results to come back.  Patient remained stable.  [facility] Procedure Note      Limited Bedside ED Ultrasound for Peripheral Vein Cannulation:     PROCEDURE: PERFORMED BY: Dr. Rashid Dumont  INDICATIONS/SYMPTOM:  Difficult peripheral intravenous access  PATIENT: Radha Mcdermott MRN: [id number]   DATE: 11/29/18 TIME: 915  PROBE: High frequency linear probe  BODY LOCATION: The ultrasound was performed on the left antecubital fossa.  FINDINGS: Artery and vein visualized.  Vein completely compressible (ie collapsible) and no thrombus visualized.  INTERPRETATION: Patent vein confirmed with US.  Peripheral line inserted  into vein utilizing real-time ultrasonic guidance.   IMAGE DOCUMENTATION: Images were archived to hard drive.      10:30 AM: Labs have come back and patient's d-dimer was negative and troponin was negative.  I repeated the patient's EKG and it continues to show the same findings as noted above with the ST depression and V2 through V6 and inverted T waves.  Initially my thought was maybe we could keep the patient here in the emergency department and get a stress test.  I consulted cardiology downstairs and spoke to Dr. MCMANUS, he looked at the EKG and noted that back in 2007 she had an essentially normal EKG at that time.  His concern is with these EKG changes and the fact that her pain does get worse with activity, he thinks that the patient most likely has unstable angina.  He thinks in light of these findings, instead of getting a stress test he would recommend a more definitive study and would recommend getting an angiogram.  He did recommend that I do try some nitroglycerin to see if this affects the pain at all.  He would recommend transfer here now for the plan of getting an angiogram either today or tomorrow.  I initially called [facility] but unfortunately they do not have any telemetry beds so I will try [facility] next.  I did talk to the patient about all of this and she is in agreement with going to do a definitive test to figure out what is going on here.  11:27 AM: I did speak with cardiology at [facility], Dr Garrido, who agrees with the assessment and will accept the patient for transfer.  He did recommend that I start the patient on a heparin drip and given 600 mg of Plavix.    Assessments & Plan (with Medical Decision Making)  Unstable angina     I have reviewed the nursing notes.    I have reviewed the findings, diagnosis, plan and need for follow up with the patient.              11/29/2018   [facility] EMERGENCY DEPARTMENT     Rashid Dumont,  MD  11/29/18 1122

## 2018-11-30 ENCOUNTER — APPOINTMENT (OUTPATIENT)
Dept: CARDIOLOGY | Facility: CLINIC | Age: 53
End: 2018-11-30
Attending: PHYSICIAN ASSISTANT
Payer: COMMERCIAL

## 2018-11-30 LAB
CHOLEST SERPL-MCNC: 223 MG/DL
ERYTHROCYTE [DISTWIDTH] IN BLOOD BY AUTOMATED COUNT: 14.8 % (ref 10–15)
HCT VFR BLD AUTO: 39.4 % (ref 35–47)
HDLC SERPL-MCNC: 63 MG/DL
HGB BLD-MCNC: 12.7 G/DL (ref 11.7–15.7)
LDLC SERPL CALC-MCNC: 147 MG/DL
LMWH PPP CHRO-ACNC: 0.16 IU/ML
LMWH PPP CHRO-ACNC: 1.7 IU/ML
LMWH PPP CHRO-ACNC: 1.81 IU/ML
LMWH PPP CHRO-ACNC: <0.1 IU/ML
MCH RBC QN AUTO: 28.5 PG (ref 26.5–33)
MCHC RBC AUTO-ENTMCNC: 32.2 G/DL (ref 31.5–36.5)
MCV RBC AUTO: 89 FL (ref 78–100)
NONHDLC SERPL-MCNC: 160 MG/DL
PLATELET # BLD AUTO: 234 10E9/L (ref 150–450)
RBC # BLD AUTO: 4.45 10E12/L (ref 3.8–5.2)
T4 FREE SERPL-MCNC: 0.85 NG/DL (ref 0.76–1.46)
TRIGL SERPL-MCNC: 67 MG/DL
TSH SERPL DL<=0.005 MIU/L-ACNC: 7.49 MU/L (ref 0.4–4)
WBC # BLD AUTO: 5.5 10E9/L (ref 4–11)

## 2018-11-30 PROCEDURE — 80061 LIPID PANEL: CPT | Performed by: PHYSICIAN ASSISTANT

## 2018-11-30 PROCEDURE — 93010 ELECTROCARDIOGRAM REPORT: CPT | Performed by: INTERNAL MEDICINE

## 2018-11-30 PROCEDURE — 93005 ELECTROCARDIOGRAM TRACING: CPT

## 2018-11-30 PROCEDURE — 25000128 H RX IP 250 OP 636: Performed by: PHYSICIAN ASSISTANT

## 2018-11-30 PROCEDURE — 99152 MOD SED SAME PHYS/QHP 5/>YRS: CPT

## 2018-11-30 PROCEDURE — 27210893 ZZH CATH CR5

## 2018-11-30 PROCEDURE — 84439 ASSAY OF FREE THYROXINE: CPT | Performed by: PHYSICIAN ASSISTANT

## 2018-11-30 PROCEDURE — 36415 COLL VENOUS BLD VENIPUNCTURE: CPT | Performed by: PHYSICIAN ASSISTANT

## 2018-11-30 PROCEDURE — 27210843 ZZH DEVICE COMPRESSION CR12

## 2018-11-30 PROCEDURE — 27211089 ZZH KIT ACIST INJECTOR CR3

## 2018-11-30 PROCEDURE — 93454 CORONARY ARTERY ANGIO S&I: CPT | Mod: 26 | Performed by: INTERNAL MEDICINE

## 2018-11-30 PROCEDURE — 27210946 ZZH KIT HC TOTES DISP CR8

## 2018-11-30 PROCEDURE — 27210787 ZZH MANIFOLD CR2

## 2018-11-30 PROCEDURE — 85520 HEPARIN ASSAY: CPT | Performed by: PHYSICIAN ASSISTANT

## 2018-11-30 PROCEDURE — 25000132 ZZH RX MED GY IP 250 OP 250 PS 637

## 2018-11-30 PROCEDURE — 84443 ASSAY THYROID STIM HORMONE: CPT | Performed by: PHYSICIAN ASSISTANT

## 2018-11-30 PROCEDURE — 25000125 ZZHC RX 250: Performed by: STUDENT IN AN ORGANIZED HEALTH CARE EDUCATION/TRAINING PROGRAM

## 2018-11-30 PROCEDURE — 96366 THER/PROPH/DIAG IV INF ADDON: CPT

## 2018-11-30 PROCEDURE — C1894 INTRO/SHEATH, NON-LASER: HCPCS

## 2018-11-30 PROCEDURE — 25000132 ZZH RX MED GY IP 250 OP 250 PS 637: Performed by: PHYSICIAN ASSISTANT

## 2018-11-30 PROCEDURE — 27210762 ZZH DEVICE SUTURELESS SECUREMENT EA CR2

## 2018-11-30 PROCEDURE — 85027 COMPLETE CBC AUTOMATED: CPT | Performed by: PHYSICIAN ASSISTANT

## 2018-11-30 PROCEDURE — 93454 CORONARY ARTERY ANGIO S&I: CPT

## 2018-11-30 PROCEDURE — 25000128 H RX IP 250 OP 636: Performed by: INTERNAL MEDICINE

## 2018-11-30 PROCEDURE — 99233 SBSQ HOSP IP/OBS HIGH 50: CPT | Mod: 25 | Performed by: PHYSICIAN ASSISTANT

## 2018-11-30 PROCEDURE — G0378 HOSPITAL OBSERVATION PER HR: HCPCS

## 2018-11-30 PROCEDURE — 36415 COLL VENOUS BLD VENIPUNCTURE: CPT | Performed by: INTERNAL MEDICINE

## 2018-11-30 PROCEDURE — 25000128 H RX IP 250 OP 636: Performed by: STUDENT IN AN ORGANIZED HEALTH CARE EDUCATION/TRAINING PROGRAM

## 2018-11-30 PROCEDURE — 85520 HEPARIN ASSAY: CPT | Performed by: INTERNAL MEDICINE

## 2018-11-30 PROCEDURE — 96376 TX/PRO/DX INJ SAME DRUG ADON: CPT | Mod: 59

## 2018-11-30 RX ORDER — PROTAMINE SULFATE 10 MG/ML
1-5 INJECTION, SOLUTION INTRAVENOUS
Status: DISCONTINUED | OUTPATIENT
Start: 2018-11-30 | End: 2018-11-30 | Stop reason: HOSPADM

## 2018-11-30 RX ORDER — FLUMAZENIL 0.1 MG/ML
0.2 INJECTION, SOLUTION INTRAVENOUS
Status: DISCONTINUED | OUTPATIENT
Start: 2018-11-30 | End: 2018-11-30 | Stop reason: HOSPADM

## 2018-11-30 RX ORDER — LIDOCAINE HYDROCHLORIDE 10 MG/ML
30 INJECTION, SOLUTION EPIDURAL; INFILTRATION; INTRACAUDAL; PERINEURAL
Status: COMPLETED | OUTPATIENT
Start: 2018-11-30 | End: 2018-11-30

## 2018-11-30 RX ORDER — NITROGLYCERIN 5 MG/ML
100-200 VIAL (ML) INTRAVENOUS
Status: DISCONTINUED | OUTPATIENT
Start: 2018-11-30 | End: 2018-11-30 | Stop reason: HOSPADM

## 2018-11-30 RX ORDER — ATROPINE SULFATE 0.1 MG/ML
.5-1 INJECTION INTRAVENOUS
Status: DISCONTINUED | OUTPATIENT
Start: 2018-11-30 | End: 2018-11-30 | Stop reason: HOSPADM

## 2018-11-30 RX ORDER — NITROGLYCERIN 0.4 MG/1
0.4 TABLET SUBLINGUAL EVERY 5 MIN PRN
Status: DISCONTINUED | OUTPATIENT
Start: 2018-11-30 | End: 2018-11-30 | Stop reason: HOSPADM

## 2018-11-30 RX ORDER — POTASSIUM CHLORIDE 7.45 MG/ML
10 INJECTION INTRAVENOUS
Status: DISCONTINUED | OUTPATIENT
Start: 2018-11-30 | End: 2018-11-30 | Stop reason: HOSPADM

## 2018-11-30 RX ORDER — SODIUM NITROPRUSSIDE 25 MG/ML
100-200 INJECTION INTRAVENOUS
Status: DISCONTINUED | OUTPATIENT
Start: 2018-11-30 | End: 2018-11-30 | Stop reason: HOSPADM

## 2018-11-30 RX ORDER — METOPROLOL TARTRATE 1 MG/ML
5 INJECTION, SOLUTION INTRAVENOUS EVERY 5 MIN PRN
Status: DISCONTINUED | OUTPATIENT
Start: 2018-11-30 | End: 2018-11-30 | Stop reason: HOSPADM

## 2018-11-30 RX ORDER — DOPAMINE HYDROCHLORIDE 160 MG/100ML
2-20 INJECTION, SOLUTION INTRAVENOUS CONTINUOUS PRN
Status: DISCONTINUED | OUTPATIENT
Start: 2018-11-30 | End: 2018-11-30 | Stop reason: HOSPADM

## 2018-11-30 RX ORDER — LORAZEPAM 2 MG/ML
.5-2 INJECTION INTRAMUSCULAR EVERY 4 HOURS PRN
Status: DISCONTINUED | OUTPATIENT
Start: 2018-11-30 | End: 2018-11-30 | Stop reason: HOSPADM

## 2018-11-30 RX ORDER — NICARDIPINE HYDROCHLORIDE 2.5 MG/ML
100-300 INJECTION INTRAVENOUS
Status: DISCONTINUED | OUTPATIENT
Start: 2018-11-30 | End: 2018-11-30 | Stop reason: HOSPADM

## 2018-11-30 RX ORDER — CLOPIDOGREL BISULFATE 75 MG/1
75 TABLET ORAL
Status: DISCONTINUED | OUTPATIENT
Start: 2018-11-30 | End: 2018-11-30 | Stop reason: HOSPADM

## 2018-11-30 RX ORDER — VERAPAMIL HYDROCHLORIDE 2.5 MG/ML
1-5 INJECTION, SOLUTION INTRAVENOUS
Status: DISCONTINUED | OUTPATIENT
Start: 2018-11-30 | End: 2018-11-30 | Stop reason: HOSPADM

## 2018-11-30 RX ORDER — NIFEDIPINE 10 MG/1
10 CAPSULE ORAL
Status: DISCONTINUED | OUTPATIENT
Start: 2018-11-30 | End: 2018-11-30 | Stop reason: HOSPADM

## 2018-11-30 RX ORDER — PROTAMINE SULFATE 10 MG/ML
25-100 INJECTION, SOLUTION INTRAVENOUS EVERY 5 MIN PRN
Status: DISCONTINUED | OUTPATIENT
Start: 2018-11-30 | End: 2018-11-30 | Stop reason: HOSPADM

## 2018-11-30 RX ORDER — ASPIRIN 325 MG
325 TABLET ORAL
Status: DISCONTINUED | OUTPATIENT
Start: 2018-11-30 | End: 2018-11-30 | Stop reason: HOSPADM

## 2018-11-30 RX ORDER — NALOXONE HYDROCHLORIDE 0.4 MG/ML
0.4 INJECTION, SOLUTION INTRAMUSCULAR; INTRAVENOUS; SUBCUTANEOUS EVERY 5 MIN PRN
Status: DISCONTINUED | OUTPATIENT
Start: 2018-11-30 | End: 2018-11-30 | Stop reason: HOSPADM

## 2018-11-30 RX ORDER — ADENOSINE 3 MG/ML
12-12000 INJECTION, SOLUTION INTRAVENOUS
Status: DISCONTINUED | OUTPATIENT
Start: 2018-11-30 | End: 2018-11-30 | Stop reason: HOSPADM

## 2018-11-30 RX ORDER — PRASUGREL 10 MG/1
10-60 TABLET, FILM COATED ORAL
Status: DISCONTINUED | OUTPATIENT
Start: 2018-11-30 | End: 2018-11-30 | Stop reason: HOSPADM

## 2018-11-30 RX ORDER — HEPARIN SODIUM 1000 [USP'U]/ML
1000-10000 INJECTION, SOLUTION INTRAVENOUS; SUBCUTANEOUS EVERY 5 MIN PRN
Status: DISCONTINUED | OUTPATIENT
Start: 2018-11-30 | End: 2018-11-30 | Stop reason: HOSPADM

## 2018-11-30 RX ORDER — PHENYLEPHRINE HCL IN 0.9% NACL 1 MG/10 ML
20-100 SYRINGE (ML) INTRAVENOUS
Status: DISCONTINUED | OUTPATIENT
Start: 2018-11-30 | End: 2018-11-30 | Stop reason: HOSPADM

## 2018-11-30 RX ORDER — HEPARIN SODIUM 10000 [USP'U]/100ML
0-3500 INJECTION, SOLUTION INTRAVENOUS CONTINUOUS
Status: DISCONTINUED | OUTPATIENT
Start: 2018-11-30 | End: 2018-11-30

## 2018-11-30 RX ORDER — POTASSIUM CHLORIDE 29.8 MG/ML
20 INJECTION INTRAVENOUS
Status: DISCONTINUED | OUTPATIENT
Start: 2018-11-30 | End: 2018-11-30 | Stop reason: HOSPADM

## 2018-11-30 RX ORDER — NITROGLYCERIN 20 MG/100ML
.07-2 INJECTION INTRAVENOUS CONTINUOUS PRN
Status: DISCONTINUED | OUTPATIENT
Start: 2018-11-30 | End: 2018-11-30 | Stop reason: HOSPADM

## 2018-11-30 RX ORDER — DIPHENHYDRAMINE HYDROCHLORIDE 50 MG/ML
25-50 INJECTION INTRAMUSCULAR; INTRAVENOUS
Status: DISCONTINUED | OUTPATIENT
Start: 2018-11-30 | End: 2018-11-30 | Stop reason: HOSPADM

## 2018-11-30 RX ORDER — ASPIRIN 81 MG/1
81 TABLET, CHEWABLE ORAL DAILY
Qty: 90 TABLET | Refills: 1 | Status: SHIPPED | OUTPATIENT
Start: 2018-12-01 | End: 2018-12-01

## 2018-11-30 RX ORDER — TIROFIBAN HYDROCHLORIDE 50 UG/ML
0.15 INJECTION INTRAVENOUS CONTINUOUS PRN
Status: DISCONTINUED | OUTPATIENT
Start: 2018-11-30 | End: 2018-11-30 | Stop reason: HOSPADM

## 2018-11-30 RX ORDER — ONDANSETRON 2 MG/ML
4 INJECTION INTRAMUSCULAR; INTRAVENOUS EVERY 4 HOURS PRN
Status: DISCONTINUED | OUTPATIENT
Start: 2018-11-30 | End: 2018-11-30 | Stop reason: HOSPADM

## 2018-11-30 RX ORDER — HYDRALAZINE HYDROCHLORIDE 20 MG/ML
10-20 INJECTION INTRAMUSCULAR; INTRAVENOUS
Status: DISCONTINUED | OUTPATIENT
Start: 2018-11-30 | End: 2018-11-30 | Stop reason: HOSPADM

## 2018-11-30 RX ORDER — EPTIFIBATIDE 2 MG/ML
180 INJECTION, SOLUTION INTRAVENOUS EVERY 10 MIN PRN
Status: DISCONTINUED | OUTPATIENT
Start: 2018-11-30 | End: 2018-11-30 | Stop reason: HOSPADM

## 2018-11-30 RX ORDER — EPINEPHRINE 1 MG/ML
0.3 INJECTION, SOLUTION, CONCENTRATE INTRAVENOUS
Status: DISCONTINUED | OUTPATIENT
Start: 2018-11-30 | End: 2018-11-30 | Stop reason: HOSPADM

## 2018-11-30 RX ORDER — ATORVASTATIN CALCIUM 40 MG/1
40 TABLET, FILM COATED ORAL EVERY MORNING
Qty: 90 TABLET | Refills: 3 | Status: SHIPPED | OUTPATIENT
Start: 2018-12-01 | End: 2018-12-01

## 2018-11-30 RX ORDER — CLOPIDOGREL BISULFATE 75 MG/1
300-600 TABLET ORAL
Status: DISCONTINUED | OUTPATIENT
Start: 2018-11-30 | End: 2018-11-30 | Stop reason: HOSPADM

## 2018-11-30 RX ORDER — METOPROLOL TARTRATE 25 MG/1
12.5 TABLET, FILM COATED ORAL 2 TIMES DAILY
Qty: 60 TABLET | Refills: 0 | Status: SHIPPED | OUTPATIENT
Start: 2018-11-30 | End: 2018-12-01

## 2018-11-30 RX ORDER — DOBUTAMINE HYDROCHLORIDE 200 MG/100ML
2-20 INJECTION INTRAVENOUS CONTINUOUS PRN
Status: DISCONTINUED | OUTPATIENT
Start: 2018-11-30 | End: 2018-11-30 | Stop reason: HOSPADM

## 2018-11-30 RX ORDER — ARGATROBAN 1 MG/ML
350 INJECTION, SOLUTION INTRAVENOUS
Status: DISCONTINUED | OUTPATIENT
Start: 2018-11-30 | End: 2018-11-30 | Stop reason: HOSPADM

## 2018-11-30 RX ORDER — EPTIFIBATIDE 2 MG/ML
2 INJECTION, SOLUTION INTRAVENOUS CONTINUOUS PRN
Status: DISCONTINUED | OUTPATIENT
Start: 2018-11-30 | End: 2018-11-30 | Stop reason: HOSPADM

## 2018-11-30 RX ORDER — METHYLPREDNISOLONE SODIUM SUCCINATE 125 MG/2ML
125 INJECTION, POWDER, LYOPHILIZED, FOR SOLUTION INTRAMUSCULAR; INTRAVENOUS
Status: DISCONTINUED | OUTPATIENT
Start: 2018-11-30 | End: 2018-11-30 | Stop reason: HOSPADM

## 2018-11-30 RX ORDER — ASPIRIN 81 MG/1
81-324 TABLET, CHEWABLE ORAL
Status: DISCONTINUED | OUTPATIENT
Start: 2018-11-30 | End: 2018-11-30 | Stop reason: HOSPADM

## 2018-11-30 RX ORDER — FUROSEMIDE 10 MG/ML
20-100 INJECTION INTRAMUSCULAR; INTRAVENOUS
Status: DISCONTINUED | OUTPATIENT
Start: 2018-11-30 | End: 2018-11-30 | Stop reason: HOSPADM

## 2018-11-30 RX ORDER — ARGATROBAN 1 MG/ML
150 INJECTION, SOLUTION INTRAVENOUS
Status: DISCONTINUED | OUTPATIENT
Start: 2018-11-30 | End: 2018-11-30 | Stop reason: HOSPADM

## 2018-11-30 RX ORDER — FENTANYL CITRATE 50 UG/ML
25-50 INJECTION, SOLUTION INTRAMUSCULAR; INTRAVENOUS
Status: DISCONTINUED | OUTPATIENT
Start: 2018-11-30 | End: 2018-11-30 | Stop reason: HOSPADM

## 2018-11-30 RX ORDER — NITROGLYCERIN 5 MG/ML
100-500 VIAL (ML) INTRAVENOUS
Status: DISCONTINUED | OUTPATIENT
Start: 2018-11-30 | End: 2018-11-30 | Stop reason: HOSPADM

## 2018-11-30 RX ORDER — ASPIRIN 325 MG
325 TABLET ORAL DAILY
Status: DISCONTINUED | OUTPATIENT
Start: 2018-11-30 | End: 2018-11-30

## 2018-11-30 RX ORDER — LIDOCAINE HYDROCHLORIDE 10 MG/ML
30 INJECTION, SOLUTION EPIDURAL; INFILTRATION; INTRACAUDAL; PERINEURAL
Status: DISCONTINUED | OUTPATIENT
Start: 2018-11-30 | End: 2018-11-30 | Stop reason: HOSPADM

## 2018-11-30 RX ORDER — NITROGLYCERIN 5 MG/ML
100-500 VIAL (ML) INTRAVENOUS
Status: COMPLETED | OUTPATIENT
Start: 2018-11-30 | End: 2018-11-30

## 2018-11-30 RX ORDER — ENALAPRILAT 1.25 MG/ML
1.25-2.5 INJECTION INTRAVENOUS
Status: DISCONTINUED | OUTPATIENT
Start: 2018-11-30 | End: 2018-11-30 | Stop reason: HOSPADM

## 2018-11-30 RX ORDER — IOPAMIDOL 755 MG/ML
22 INJECTION, SOLUTION INTRAVASCULAR ONCE
Status: COMPLETED | OUTPATIENT
Start: 2018-11-30 | End: 2018-11-30

## 2018-11-30 RX ORDER — DEXTROSE MONOHYDRATE 25 G/50ML
12.5-5 INJECTION, SOLUTION INTRAVENOUS EVERY 30 MIN PRN
Status: DISCONTINUED | OUTPATIENT
Start: 2018-11-30 | End: 2018-11-30 | Stop reason: HOSPADM

## 2018-11-30 RX ORDER — ASPIRIN 81 MG/1
81 TABLET, CHEWABLE ORAL DAILY
Status: DISCONTINUED | OUTPATIENT
Start: 2018-12-01 | End: 2018-12-01

## 2018-11-30 RX ORDER — ATORVASTATIN CALCIUM 40 MG/1
40 TABLET, FILM COATED ORAL EVERY MORNING
Status: DISCONTINUED | OUTPATIENT
Start: 2018-11-30 | End: 2018-12-01

## 2018-11-30 RX ADMIN — ATORVASTATIN CALCIUM 40 MG: 40 TABLET, FILM COATED ORAL at 08:11

## 2018-11-30 RX ADMIN — IOPAMIDOL 22 ML: 755 INJECTION, SOLUTION INTRAVASCULAR at 19:45

## 2018-11-30 RX ADMIN — NITROGLYCERIN 200 MCG: 5 INJECTION, SOLUTION INTRAVENOUS at 19:22

## 2018-11-30 RX ADMIN — FENTANYL CITRATE 50 MCG: 50 INJECTION, SOLUTION INTRAMUSCULAR; INTRAVENOUS at 19:11

## 2018-11-30 RX ADMIN — CALCITRIOL CAPSULES 0.25 MCG 0.25 MCG: 0.25 CAPSULE ORAL at 08:06

## 2018-11-30 RX ADMIN — LEVOTHYROXINE SODIUM 125 MCG: 125 TABLET ORAL at 08:06

## 2018-11-30 RX ADMIN — MIDAZOLAM 2 MG: 1 INJECTION INTRAMUSCULAR; INTRAVENOUS at 19:11

## 2018-11-30 RX ADMIN — CALCITRIOL CAPSULES 0.25 MCG 0.25 MCG: 0.25 CAPSULE ORAL at 20:29

## 2018-11-30 RX ADMIN — METOPROLOL TARTRATE 12.5 MG: 25 TABLET ORAL at 08:06

## 2018-11-30 RX ADMIN — ASPIRIN 325 MG: 325 TABLET, DELAYED RELEASE ORAL at 16:56

## 2018-11-30 RX ADMIN — FENTANYL CITRATE 50 MCG: 50 INJECTION, SOLUTION INTRAMUSCULAR; INTRAVENOUS at 19:26

## 2018-11-30 RX ADMIN — METOPROLOL TARTRATE 12.5 MG: 25 TABLET ORAL at 20:29

## 2018-11-30 RX ADMIN — SENNOSIDES AND DOCUSATE SODIUM 1 TABLET: 8.6; 5 TABLET ORAL at 20:29

## 2018-11-30 RX ADMIN — NICARDIPINE HYDROCHLORIDE 200 MCG: 2.5 INJECTION INTRAVENOUS at 19:21

## 2018-11-30 RX ADMIN — HEPARIN SODIUM 1550 UNITS/HR: 10000 INJECTION, SOLUTION INTRAVENOUS at 06:42

## 2018-11-30 RX ADMIN — LIDOCAINE HYDROCHLORIDE 30 ML: 10 INJECTION, SOLUTION EPIDURAL; INFILTRATION; INTRACAUDAL; PERINEURAL at 19:11

## 2018-11-30 RX ADMIN — ACETAMINOPHEN 650 MG: 325 TABLET, FILM COATED ORAL at 04:09

## 2018-11-30 RX ADMIN — HEPARIN SODIUM 5000 UNITS: 1000 INJECTION, SOLUTION INTRAVENOUS; SUBCUTANEOUS at 19:21

## 2018-11-30 RX ADMIN — ACETAMINOPHEN 650 MG: 325 TABLET, FILM COATED ORAL at 11:42

## 2018-11-30 RX ADMIN — SENNOSIDES AND DOCUSATE SODIUM 1 TABLET: 8.6; 5 TABLET ORAL at 08:11

## 2018-11-30 ASSESSMENT — PAIN DESCRIPTION - DESCRIPTORS
DESCRIPTORS: ACHING;DULL;HEADACHE
DESCRIPTORS: HEADACHE

## 2018-11-30 NOTE — DISCHARGE SUMMARY
[address] [address]  p: [phone]    Discharge Summary: Cardiology Service    Radha Mcdermott MRN# [id number]   YOB: 1965 Age: 53 year old       Admission Date: 11/29/2018  Discharge Date: 11/30/18      Discharge Diagnoses:  1. Chest pain with EKG changes   2. ?Unstable angina, clean coronary arteries on angiography   3. Hypothyroidism status post thyroidectomy in 2005      Pertinent Procedures:  1. Coronary angiogram     Consults:  Nutrition  Endocrine     Imaging with results:  Echocardiogram 11/30/18:  Left ventricular size and systolic function is normal.The EF is 55-60%. No  diagnostic wall motion abnormality.  Right ventricular function, chamber size, wall motion, and thickness are  normal.  No significant valvular abnormalities.  IVC is normal in size 2.0 cm.  No pericardial effusion is present.    Coronary Angiogram 11/30/18:  CORONARY ANGIOGRAM:   1. Both coronary arteries arise from their respective cusps.  2. Left dominant.  3. LM is a large caliber vessel that is free of angiographic evidence of disease.   4. Ramus intermedius is a moderate calibre vessel that is free of angiographic evidence of disease along its course.   5. LAD is a large caliber, type III vessel that has four diagonal branches.  The length of the LAD and the three diagonal branches are free of angiographic evidence of disease. D1-D3 are all small in caliber.  5. LCX is a large caliber, dominant vessel with three OM branches.  The length of the LCx and the OM1-3 branches are free of disease.  The LPDA is also free of disease.   6. RCA s a small caliber, non-dominant vessel with small caliber PLs.  The length of the RCA is free of angiographic evidence of disease.      COMPLICATIONS:  1. None     SUMMARY/DIAGNOSES:   1. Chest pain without epicardial coronary disease.  2. Consider microvascular disease and non-coronary causes of chest pain.     Other  "imaging studies:  CXR 11/29/18  Faint nodular densities are seen in both lung zones which are presumably due to granulomatous disease. No infiltrates or effusions are present. Heart size and pulmonary vasculature are Normal.    Brief HPI:  53 year old female without significant cardiac risk factors who is admitted with 3 days of chest pain and EKG changes. Her workup is remarkable for a negative troponin x 2, EKG with non-specific ST depressions with biphasic TWI in V2-V3. She is transferred to [facility] for further workup for unstable angina. She was transferred on heparin gtt.  Her chest pain had both concerning and atypical features.       Hospital Course by Diagnosis:  Unstable Angina   Chest pain with EKG changes  54 yo female with no prior cardiovascular disease and no cardiac risk factors other than remote history of tobacco use who is sent from [facility] ER for chest pain 3 days in duration, constant, across her anterior chest, \"6/10\" growing in severity and now migrating into her left upper extremity. She also endorses feeling \"fatigued\" for one week and occasional MENJIVAR. EKG in the ER showed ST depression in her precordial leads V2-V6 though these may be more pronounced given her biphasic TW, ST depression in II. Her chest pain had atypical features as well in that it may be positional with lying supine and has pleuritic features as well. She also has occasional left lower extremity swelling and cramping though a d-dimer is 0.30 and she is oxygenating on room air making PE less likely. VS are stable. Hgb a1c within 3 mo 5.5. TTE is normal, normal LV EF 55-60%, normal RV size/function, no significant valvular disease. Coronary angiogram revealed clean coronaries. The patient was discharged with improved symptoms, though the etiology of her chest pain is undetermined we can confirm that it is not ACS. Should her pain recur, could consider vasospasm at which point would recommend she trial a CCB.   -No further workup " "indicated   -Please note Metoprolol, Statin, and aspirin were prescribed however in lieu of her clean coronary angiogram, these are unneccessary   -If continued symptoms would recommend a CMRI in the future to rule out microvascular disease however given that she is feeling improved will defer this   -Follow up with PCP for HLD to discuss diet and lifestyle modification before statin therapy      Status post thyroidectomy with post-surgical hypoparathyroidism   Patient has history of papillary cell carcinoma of her thyroid status post thyroidectomy in 2005 complicated by hypoparathyroidism  -TSH elevated this AM 7.49. Free T4 0.85. Per her, no missed synthroid prior to arrival.   -Patient should have a repeat TSH this week, please call your endocrinologists' office   -Continue Rocalctrol 0.25 BID (PTA dosing)   -Continue Synthroid 125 mcg       Pulmonary Nodules  Stable pulmonary nodules on multiple CT images from 2064-2464. On CXR yesterday \"Faint nodular densities are seen in both lung zones which are presumably due to granulomatous disease. No infiltrates or effusions are present. Heart size and pulmonary vasculature are normal.\" No CXR to compare. No active disease, will defer further evaluation for outpatient.  -Patient can follow with her primary endocrinologist and PCP  -Consider repeat chest CT outpatient     Condition on discharge  Temp:  [97.9  F (36.6  C)-98.5  F (36.9  C)] 98.5  F (36.9  C)  Heart Rate:  [59-72] 59  Resp:  [16-20] 20  BP: ()/(52-63) 104/59  SpO2:  [93 %-96 %] 94 %  General: Alert, interactive, NAD  Eyes: sclera anicteric, EOMI  Neck: JVP flat, carotid 2+ bilaterally  Cardiovascular: regular rate and rhythm, normal S1 and S2, no murmurs, gallops, or rubs. Right radial access without hematoma or fluctuance   Resp: clear to auscultation bilaterally, no rales, wheezes, or rhonchi  GI: Soft, nontender, nondistended. +BS.  No HSM or masses, no rebound or guarding.  Extremities: No edema, " no cyanosis or clubbing, dorsalis pedis and posterior tibialis pulses 2+ bilaterally  Skin: Warm and dry, no jaundice or rash  Neuro: CN 2-12 intact, moves all extremities equally  Psych: Alert & oriented x 3      Medication Changes:  No medication changes needed     Discharge medications:   Current Discharge Medication List      CONTINUE these medications which have NOT CHANGED    Details   calcitRIOL (ROCALTROL) 0.25 MCG capsule TAKE ONE CAPSULE BY MOUTH TWICE A DAY  Qty: 180 capsule, Refills: 2    Associated Diagnoses: Other hypoparathyroidism (H)      docusate sodium 100 MG tablet Take 100 mg by mouth 2 times daily as needed for constipation  Qty: 60 tablet, Refills: 11    Associated Diagnoses: External hemorrhoids      magnesium oxide (MAG-OX) 400 MG tablet TAKE ONE-HALF TABLET BY MOUTH TWICE A DAY  Qty: 90 tablet, Refills: 2    Associated Diagnoses: Papillary thyroid carcinoma (H); Post-surgical hypoparathyroidism (H); Postsurgical hypothyroidism      SYNTHROID 125 MCG tablet Take 1 tablet (125 mcg) by mouth daily  Qty: 90 tablet, Refills: 0    Associated Diagnoses: Malignant neoplasm of thyroid gland (H); Postsurgical hypothyroidism      EPINEPHrine (EPIPEN) 0.3 MG/0.3ML injection Inject 0.3 mg into the muscle once as needed for anaphylaxis             Labs or imaging requiring follow-up after discharge:  TSH with free T4   CBC, BMP       Follow-up:  5-7 days with PCP   Follow up with your endocrinologist in the next 1-2 months   Follow up with cardiology as arranged    Code status:  Full    Pt was seen by, and discharge plan discussed with Dr. Radhika RODRIGUEZ   Cardiology  Pager [phone]    Patient Care Team:  Jenna Rey MD as PCP - General (Family Practice)  Vero Arevalo MD as MD (INTERNAL MEDICINE - ENDOCRINOLOGY, DIABETES & METABOLISM)

## 2018-11-30 NOTE — CONSULTS
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 She was sent to [facility] for further evaluation from outside ED after presenting with reported 3 day hx of chest pain 11/29/18 and was found to have EKG changes.TSH was evaluated with initial [facility] labs and was noted to be elevated at 7.49 mU/L thus endocrine was consulted for further assistance.    Assessment:  Multifocal papillary thyroid carcinoma with ETE and multiple positive nodes initially diagnosed in 2005   Pt follows with Dr. Arevalo in Endocrinology clinic, last seen 9/24/18. Her most recent thyroglobulin level was 0.18. The pt appears to have been on a stable dose of 125mcg per day LT4 for several years with TSH levels appropriately suppressed to goal of TSH less than 0.4mU/L. The pt reports complete adherence to and appropriate administration of LT4 continued 125mcg without any evidence of processes indicative of new malabsorptive state. Given long history of TSH levels at goal and lack of any identified etiology that would precipitate need for change in LT4 dose, would recommend recheck of TSH tomorrow and in several weeks before any dosage change. Though unsuppressed TSH could increase risk for further cancer recurrence over the long term, a very short period of unsuppressed TSH would not precipitate clinically significant change in risk.     Recommendations:  -appreciate " added on FT4  -recheck TSH tomorrow 12/1/18 to rule out lab error (ordered)   -unless TSH normalizes on recheck, plan to recheck TSH and free T4 in approximately 2 weeks after pt is discharged. If levels remain altered, consider LT4 dose change at that time.           Chief Complaint:   Chest pain      History is obtained from the patient          History of Present Illness:   This patient is a 53 year old female with PMH including multifocal papillary thyroid carcinoma with ETE and multiple positive nodes initially diagnosed in 2005 s/p multiple surgeries and radioiodine ablation, post surgical hypoparathyroidism. She was sent to [facility] for further evaluation from outside ED after presenting with reported 3 day hx of chest pain 11/29/18 and was found to have EKG changes.    TSH was evaluated with initial [facility] labs and was noted to be elevated at 7.49 mU/L thus endocrine was consulted for further assistance.     Pt is to undergo angiogram later today.    Extensive hx regarding papillary thyroid cancer per pt's primary endocrinologist Dr. Arevalo, updated as per most recent office visit 9/24/18:  She has been treated with 3 operations  10/7/05   Thyroidectomy removing  left sided PCT 5.0 x 3.0 x 2.8 cm, extending through the capsule into parathyroidal soft tissues.  There was - Microscopic foci of papillary carcinoma in the right lobe and one LN removed was positive for pCT.  Her post operative course was  complicated by hypoparathyroidism.   132 mCi 131I 11/05.     12/28/06   Left modified neck dissection.    10/22/09 selective neck dissection to include level 7 and level 4 on the left.  Surgical pathology was positive for metastatic PCT in lymph nodes (see results)     We have the following tumor marker data:  9/9/05: Tg 290, JUAN DIEGO < 1, TSH   Operation # 1  10/28/05: Tg 131, JUAN DIEGO < 1, TSH 15.12  11/28/05: Tg 116, JUAN DIEGO <1, ,7  2/3/06: Tg 26.7, JUAN DIEGO < 1, TSH 14.43  6/2/06: Tg 2.2, JUAN DIEGO < 1, TSH 0.36  11/3/06: Tg  18, JUAN DIEGO < 1  Operation # 2  1/30/07: Tg 1.1, JUAN DIEGO < 1, TSH   4/17/07: Tg 2, JUAN DIEGO < 1, TSH   12/11/07: Tg 13.9, JUAN DIEGO < 1, TSH 42.02  1/8/08: Tg 2.7, JUAN DIEGO < 1, TSH   3/3/09: Tg 1.0, JUAN DIEGO < 0.4, TSH   4/24/09: Tg 4667, JUAN DIEGO < 0.4  Operation # 3  1/6/10: Tg 0.12, JUAN DIEGO < 0.4, TSH < 0.03  10/17/11: Tg 0.3, JUAN DIEGO < 20,   1/20/12: Tg 3.1, JUAN DIEGO < 20  3/21/12: Tg 0.1, JUAN DIEGO < 20, TSH 0.05  4/26/12: Tg 0.1, JUAN DIEGO < 20, TSH 0.02  10/26/12: Tg 3.5, JUAN DIEGO < 20, TSH 0.02 TBS -- supposedly thyrogen was given but the results and dates don't quite fit  4/26/13: Tg 0.2, JUAN DIEGO < 20, TSH 0.01  11/8/13: Tg 3.3, JUAN DIEGO < 20 TBS negative  2/10/14: Tg 0.1, JUAN DIEGO < 20  8/18/14: Tg 0.1, JUAN DIEGO < 20, TSH 0.00  11/21/14: Tg 4 ng/ml (athyrotic < 0.1, intact < 33 [facility] lab) JAUN DIEGO < 1.8,TSH 0.01  12/30/14: TSH 0.01  3/4/15; Tg 0.19, JUAN DIEGO < 0.4, TSH  <0.02 ; concurrent remeasure of 1/6/10 Tg 0.15  9/2/15: Tg 0.11, JUAN DIEGO < 0.4, TSH 0.01  5/26/16: Tg 0.27, JUAN DIEGO < 0.4, TSH 0.55  12/13/16: Tg 0.14, JUAN DIEGO < 0.4, TSH 0.07, free T4 1.32  6/13/17: Tg 0.17, JUAN DIEGO < 0.4, TSH 0.11-   9/24/18: Tg 0.18, JUAN DIEGO < 0.4, TSH 0.14 , free T 4 1.22, results followed appt, not discussed at appt       past imaging on PACs.    3/4/09 neck US: left level 7 lymph node, ? left level 4  5/27/09 PET/CT negative to my eye     Outside imaging reports:  10/26/12: 4 mCi 131I TBS negative  11/6/13: 4 mCi 131I TBS: negative  12/23/14: PET CT without contrast: focal left ovarian avidity of uncertain significance (also seen on outside PET 2010)-- t  6/7/16 US neck negative  6/7/16 CT chest: scattered subcm nodules    Per review of medication rx history, the pt appears to have been on LT4 125mcg per day at least since 2015. All recent TSH values prior to 10/30/18 check were within goal.     ENDO THYROID LABS-[facility] Latest Ref Rng & Units 11/30/2018 9/24/2018   TSH 0.40 - 4.00 mU/L 7.49 (H) 0.14 (L)   T4 FREE 0.76 - 1.46 ng/dL 0.85 1.22   T3, FREE      FREE T3 2.3 - 4.2 pg/mL     TRIIODOTHYRONINE(T3) 60 - 181 ng/dL      THYROGLOBULIN ANTIBODY <40 IU/mL     THYR PEROXIDASE CONSTANTINO <35 IU/mL       ENDO THYROID LABS-[facility] Latest Ref Rng & Units 6/13/2017 12/13/2016   TSH 0.40 - 4.00 mU/L 0.11 (L) 0.07 (L)   T4 FREE 0.76 - 1.46 ng/dL 1.06 1.32   T3, FREE      FREE T3 2.3 - 4.2 pg/mL     TRIIODOTHYRONINE(T3) 60 - 181 ng/dL     THYROGLOBULIN ANTIBODY <40 IU/mL     THYR PEROXIDASE CONSTANTINO <35 IU/mL       [facility] 5/26/2016 9/2/2015   TSH 0.40 - 4.00 mU/L 0.55 0.01 (L)   T4 FREE 0.76 - 1.46 ng/dL 1.03 1.38   T3, FREE      FREE T3 2.3 - 4.2 pg/mL     TRIIODOTHYRONINE(T3) 60 - 181 ng/dL     THYROGLOBULIN ANTIBODY <40 IU/mL     THYR PEROXIDASE CONSTANTINO <35 IU/mL       [facility] 5/20/2015 3/4/2015   TSH 0.40 - 4.00 mU/L <0.01 (L) <0.01 (L) . . .   T4 FREE 0.76 - 1.46 ng/dL 1.39 1.37   T3, FREE      FREE T3 2.3 - 4.2 pg/mL     TRIIODOTHYRONINE(T3) 60 - 181 ng/dL     THYROGLOBULIN ANTIBODY <40 IU/mL     THYR PEROXIDASE CONSTANTINO <35 IU/mL       [facility] 7/9/2014   TSH 0.40 - 4.00 mU/L <0.02 (L)   T4 FREE 0.76 - 1.46 ng/dL 1.67   T3, FREE     FREE T3 2.3 - 4.2 pg/mL 4.9 (H)   TRIIODOTHYRONINE(T3) 60 - 181 ng/dL    THYROGLOBULIN ANTIBODY <40 IU/mL    THYR PEROXIDASE CONSTANTINO <35 IU/mL      Goal TSH is <0.4 per pt's primary endocrinologist.      The pt reports complete adherence with 125mcg per day LT4 without missed doses. She takes it at 6am with no other medications, waits 30 min to eat. Does not take any calcium or iron at all. Nothing in her routine re LT4 has changed. She is not having diarrhea or abdominal pain. Weight appears stable from recent office visit.     She feels better overall though she still has some remaining chest discomfort.     Remaining ROS is non contributory           Past Medical History:     Past Medical History:   Diagnosis Date     Abnormal Papanicolaou smear of cervix and cervical HPV 12/03    ASCUS - repeat paps all ok.       Depressive disorder, not elsewhere classified      Dysmenorrhea      Fracture of arm age 14    right     Gestational diabetes      Papillary thyroid carcinoma (H) 10/05    5 cm, MF, + ETE, node+; 3 operations, 131I     Postsurgical hypoparathyroidism (H) 2005     Postsurgical hypothyroidism 2005     Unspecified contraceptive management              Past Surgical History:     Past Surgical History:   Procedure Laterality Date     BIOPSY/REMOVAL, LYMPH NODE(S)  10/27/2009    Surgical resection of lymph nodes, neck.  Scar revision. [facility].     C THYROIDECTOMY  10/07/2005    Total thyroidectomy.  [facility]. Further lymph node resection in 12/06     HC BIOPSY/EXCISION LYMPH NODE OPEN SUPERFICIAL  2007    removal of some more lymph nodes in the neck.       REMOVE TONSILS/ADENOIDS,14+ Y/O  1984             Social History:     Social History   Substance Use Topics     Smoking status: Former Smoker     Packs/day: 0.50     Years: 15.00     Quit date: 3/1/1998     Smokeless tobacco: Never Used      Comment: no smoking in the home     Alcohol use Yes      Comment: social             Family History:     Family History   Problem Relation Age of Onset     Lipids Mother      Osteoporosis Mother      Cancer Father      skin     Cancer Paternal Grandfather      prostate cancer     Alzheimer Disease Paternal Grandmother      Heart Disease Paternal Grandmother      Cerebrovascular Disease Paternal Grandmother      Thyroid Disease Paternal Grandmother      Diabetes Maternal Grandmother      Thyroid Disease Maternal Grandmother      thyroid disease     Allergies Son      pcn     Cancer Maternal Aunt      breast - mid-30s               Medications:     Current Facility-Administered Medications   Medication     acetaminophen (TYLENOL) Suppository 650 mg     acetaminophen (TYLENOL) tablet 650 mg     alum & mag hydroxide-simethicone (MYLANTA ES/MAALOX  ES) suspension 30 mL     [START ON 12/1/2018] aspirin (ASA) chewable tablet 81 mg      "atorvastatin (LIPITOR) tablet 40 mg     calcitRIOL (ROCALTROL) capsule 0.25 mcg     lidocaine (LMX4) cream     lidocaine 1 % 1 mL     medication instruction     metoprolol tartrate (LOPRESSOR) half-tab 12.5 mg     morphine (PF) injection 1-2 mg     naloxone (NARCAN) injection 0.1-0.4 mg     nitroGLYcerin (NITROSTAT) sublingual tablet 0.4 mg     ondansetron (ZOFRAN-ODT) ODT tab 4 mg    Or     ondansetron (ZOFRAN) injection 4 mg     Patient is already receiving anticoagulation with heparin, enoxaparin (LOVENOX), warfarin (COUMADIN)  or other anticoagulant medication     prochlorperazine (COMPAZINE) injection 10 mg    Or     prochlorperazine (COMPAZINE) tablet 10 mg    Or     prochlorperazine (COMPAZINE) Suppository 25 mg     senna-docusate (SENOKOT-S/PERICOLACE) 8.6-50 MG per tablet 1 tablet    Or     senna-docusate (SENOKOT-S/PERICOLACE) 8.6-50 MG per tablet 2 tablet     sodium chloride (PF) 0.9% PF flush 3 mL     sodium chloride (PF) 0.9% PF flush 3 mL     SYNTHROID 125 MCG PO (BRAND NAME ONLY)     Prescriptions Prior to Admission   Medication Sig Dispense Refill Last Dose     calcitRIOL (ROCALTROL) 0.25 MCG capsule TAKE ONE CAPSULE BY MOUTH TWICE A  capsule 2 11/29/2018 at 1200     docusate sodium 100 MG tablet Take 100 mg by mouth 2 times daily as needed for constipation 60 tablet 11 11/28/2018 at Unknown time     magnesium oxide (MAG-OX) 400 MG tablet TAKE ONE-HALF TABLET BY MOUTH TWICE A DAY (Patient taking differently: TAKE ONE TABLET BY MOUTH DAILY) 90 tablet 2 11/29/2018 at 1200     SYNTHROID 125 MCG tablet Take 1 tablet (125 mcg) by mouth daily 90 tablet 0 11/29/2018 at 0600     EPINEPHrine (EPIPEN) 0.3 MG/0.3ML injection Inject 0.3 mg into the muscle once as needed for anaphylaxis   More than a month at Unknown time             Review of Systems:   Complete ROS obtained     Physical examination:  BP 99/52 (BP Location: Left arm)  Temp 98.1  F (36.7  C) (Oral)  Resp 20  Ht 1.676 m (5' 6\")  Wt 86.4 " kg (190 lb 6.4 oz)  SpO2 94%  BMI 30.73 kg/m2  Body mass index is 30.73 kg/(m^2).    Gen: No acute distress, comfortable appearing   HEENT: No noted icterus, MMM  Neck/thyroid: anterior neck tender to palpation (chronic per pt) no residual thyroid tissue/masses noted   CV: Regular rhythm, borderline bradycardia   Pulm: Breathing comfortably on room air. No wheezing.   Ext: No bony deficits noted, mild LE edema   Integumentary: No rashes or other lesions identified   Neuro: No focal deficits noted, muscle bulk appears intact  Psych: Mood and affect congruent           Data:   See labs above     Patient seen and discussed with Endocrinology Attending Dr. Arlyn Emerson MD  Endocrine Fellow   Pager [phone]         .ATTENDING NOTE    I have seen and examined the patient, reviewed and edited the fellow's note, and agree with the plan of care.    Margie Stoddard MD PhD    Division of Endocrinology and Diabetes

## 2018-11-30 NOTE — PLAN OF CARE
"Problem: Cardiac: ACS (Acute Coronary Syndrome) (Adult)  Goal: Signs and Symptoms of Listed Potential Problems Will be Absent, Minimized or Managed (Cardiac: ACS)  Signs and symptoms of listed potential problems will be absent, minimized or managed by discharge/transition of care (reference Cardiac: ACS (Acute Coronary Syndrome) (Adult) CPG).   Outcome: No Change  D:NPO since midnight of cardiac catheterization.   Given small amount of clear liquids with a.m.  Medication.  Reports chest pain is \"0-1 out 10\"  Or \"none\".   Denies shortness of breath.  Lungs are clear bilaterally.   No edema.   Report frontal head ache and sinus  Ache between eyes.   Given plain tylenol with relief.  Heparin drip discontinued as ordered.  Hep 10a level 1.70.   MD informed.  Recheck 1.80.   MD  On CSI informed of results.   q4hr hep 10a level ordered q4hrs in [facility].   Rhythm is SR/SB 60-59 with no ectopy.   Temp 98.5, Bp 104/59   MAP 77  RR 20  O2 sat 94% on room air.   here since mid morning,  Both patiently awaiting call for cath lab.    A:Resting in bed.   Head ache subside with plain tylenol.   No other physical complaints.   AVSS.   Sat's normal on room air.   Rhythm is unchanged.  Conditon is stable.    P:Continue to assess level of comfort.  Monitor rhythm and vital signs.   Notify MD of chest pain or angina equivalient. Up as tolerated.  Monitor rhythm, vital signs and O2 sats.      "

## 2018-11-30 NOTE — PROGRESS NOTES
"  Interventional Cardiology Progress Note    Interval History: No acute events overnight, chest pain improving. Denies nausea, vomiting, fever, chills, CP, SOB, cough, abdominal pain, dysuria, LE pain/swelling.     Objective:  Most recent vital signs:  BP 99/52 (BP Location: Left arm)  Temp 98.1  F (36.7  C) (Oral)  Resp 20  Ht 1.676 m (5' 6\")  Wt 86.4 kg (190 lb 6.4 oz)  SpO2 94%  BMI 30.73 kg/m2  Temp:  [97.9  F (36.6  C)-99  F (37.2  C)] 98.1  F (36.7  C)  Pulse:  [66] 66  Heart Rate:  [59-89] 59  Resp:  [4-27] 20  BP: ()/(47-70) 99/52  SpO2:  [93 %-98 %] 94 %  Wt Readings from Last 2 Encounters:   11/30/18 86.4 kg (190 lb 6.4 oz)   11/29/18 88 kg (194 lb)       Intake/Output Summary (Last 24 hours) at 11/30/18 0836  Last data filed at 11/30/18 0746   Gross per 24 hour   Intake           425.36 ml   Output              600 ml   Net          -174.64 ml     Physical exam:  General: In bed, in NAD  HEENT: EOMI, PERRLA, no scleral icterus or injection  CARDIAC: RRR, no m/r/g appreciated. Peripheral pulses 2+  RESP: CTAB, no wheezes, rhonchi or crackles appreciated.  GI: NABS, NT/ND, no guarding or rebound  :   EXTREMITIES: NO LE edema, pulses 2+. Femoral access site w/o bleeding, dressing c/d/i. No bruits appreciated.   SKIN: No acute lesions appreciated  NEURO: Alert and oriented X3, CN II-XII grossly intact, no focal neurological deficits noted    Labs (Past three days):  CBC  Recent Labs  Lab 11/29/18  1533 11/29/18  0921   WBC 7.1 7.6   RBC 4.42 4.34   HGB 12.7 12.3   HCT 39.3 37.6   MCV 89 87   MCH 28.7 28.3   MCHC 32.3 32.7   RDW 14.6 14.4    248     BMP  Recent Labs  Lab 11/29/18  1533 11/29/18  0921    142   POTASSIUM 3.8 3.7   CHLORIDE 108 106   CO2 27 27   ANIONGAP 6 9   GLC 92 98   BUN 17 19   CR 0.98 1.02   GFRESTIMATED 59* 57*   GFRESTBLACK 72 69   PACO 8.3* 8.1*   MAG  --  2.1   PHOS  --  3.1     Troponins:   Lab Results   Component Value Date    TROPI <0.015 11/29/2018    " "TROPI <0.015 11/29/2018    TROPI <0.015 11/29/2018        INRNo lab results found in last 7 days.  Liver panel  Recent Labs  Lab 11/29/18  0921   PROTTOTAL 7.9   ALBUMIN 3.8   BILITOTAL 0.3   ALKPHOS 71   AST 17   ALT 29       Imaging/procedure results:  TTE 11/29/18:   Left ventricular size and systolic function is normal.The EF is 55-60%. No  diagnostic wall motion abnormality.  Right ventricular function, chamber size, wall motion, and thickness are  normal.  No significant valvular abnormalities.  IVC is normal in size 2.0 cm.  No pericardial effusion is present.    CXR 11/29/18  Faint nodular densities are seen in both lung zones which  are presumably due to granulomatous disease. No infiltrates or  effusions are present. Heart size and pulmonary vasculature are  normal.      Patient seen and discussed w/ Dr. Garrido     Assessment & Plan:  ASSESSMENT AND PLAN:   53 year old female without significant cardiac risk factors who is admitted with 3 days of chest pain and EKG changes. Her workup is remarkable for a negative troponin x 1, EKG with non-specific ST Depressions with biphasic TWI in V2-V3, questionable for Wellen's but not overtly consistent with this finding. Exam and history are somewhat atypical for large LAD territory disease and a TTE is normal. She is transferred to [facility] for further workup for unstable angina.      Unstable Angina   Chest pain with EKG changes  52 yo female with no prior cardiovascular disease and no cardiac risk factors other than remote history of tobacco use who is sent from OSH ER for chest pain 3 days in duration, constant, across her anterior chest, \"6/10\" growing in severity and now migrating into her left upper extremity. She also endorses feeling \"fatigued\" for one week and occasional MENJIVAR. EKG in the ER showed ST depression in her precordial leads V2-V6 though these may be more pronounced given her biphasic TW, ST depression in II - concern for unstable angina and " "possible Wellen's sign on EKG though not overtly consistent with this finding. Her chest pain has atypical features as well in that it may be positional with lying supine and is pleuritic in nature. She also has occasional left lower extremity swelling and cramping though a d-dimer is 0.30 and she is oxygenating on room air. VS are stable. Hgb a1c within 3 mo 5.5. TTE is normal, normal LV EF 55-60%, normal RV size/function, no significant valvular disease.    -Troponin x 3 were negitive  -Xa levels 1.7 and 1.8 this am. Recheck Xa levels q6h. Heparin gtt held given critical values.   -ASA 81 mg    -Metoprolol 12.5 mg BID  -Atorvastatin 40 mg daily  -Hold SL nitroglycerin given hypotension and no improvement in symptoms  -NPO today for possible coronary angiogram  -ECG repeat this AM with no changes from previous     Status post thyroidectomy with post-surgical hypoparathyroidism   Patient has history of papillary cell carcinoma of her thyroid status post thyroidectomy in 2005 complicated by hypoparathyroidism  -TSH elevated this AM 7.49. Free T4 pending. Per her, no missed medication doses.   -Endocrine consult placed, appreciate assistance   -Continue Rocalctrol 0.25 BID (PTA dosing)   -Continue Synthroid 125 mcg  -Will need close follow up with her endocrinologist for management      Pulmonary Nodules  Stable pulmonary nodules on multiple CT images from [id number]. On CXR yesterday \"Faint nodular densities are seen in both lung zones which are presumably due to granulomatous disease. No infiltrates or effusions are present. Heart size and pulmonary vasculature are normal.\" No CXR to compare. No active disease, will defer further evaluation for outpatient.  -Patient can follow with her primary endocrinologist and PCP with whom she has followed.  -Consider repeat chest CT outpatient       Chong Arias PA-C  [facility] Cardiology  Pager [phone]      [facility]  Interventional Cardiology

## 2018-11-30 NOTE — PLAN OF CARE
"Problem: Goal Outcome Summary  Goal: Goal Outcome Summary  RN  1. Pt will be hemodynamically stable.  2. Pt and family will verbalize understanding of plan of care.  3. Pt will remain free of falls  4. Pain will be under control or minimal    Outcome: No changen    D: Admitted 11/29 for chest pain. Pt has h/o hypothyroidism and nodules (former smoker - quit 19 years ago).   I: Monitored vitals and assessed pt status..  Running: Heparin at 1550 units/hr  PRN: Tylenol 650 mg x 1  /56 (BP Location: Right arm)  Temp 98.2  F (36.8  C) (Oral)  Resp 20  Ht 1.676 m (5' 6\")  Wt 86.4 kg (190 lb 6.4 oz)  SpO2 95%  BMI 30.73 kg/m2    A: A0x4. VSS. Room air. SR. Afebrile. Patient sleeping between cares.   P: NPO since midnight for Angiogram. Will continue to monitor and manage pt per plan of care. Please report any pertinent changes in pt's condition.          "

## 2018-12-01 VITALS
HEART RATE: 62 BPM | BODY MASS INDEX: 30.55 KG/M2 | RESPIRATION RATE: 18 BRPM | SYSTOLIC BLOOD PRESSURE: 103 MMHG | DIASTOLIC BLOOD PRESSURE: 42 MMHG | TEMPERATURE: 97.9 F | OXYGEN SATURATION: 95 % | HEIGHT: 66 IN | WEIGHT: 190.1 LBS

## 2018-12-01 DIAGNOSIS — E89.0 POSTSURGICAL HYPOTHYROIDISM: ICD-10-CM

## 2018-12-01 DIAGNOSIS — C73 MALIGNANT NEOPLASM OF THYROID GLAND (H): ICD-10-CM

## 2018-12-01 PROCEDURE — G0378 HOSPITAL OBSERVATION PER HR: HCPCS

## 2018-12-01 PROCEDURE — 25000132 ZZH RX MED GY IP 250 OP 250 PS 637

## 2018-12-01 PROCEDURE — 85520 HEPARIN ASSAY: CPT | Performed by: PHYSICIAN ASSISTANT

## 2018-12-01 PROCEDURE — 99238 HOSP IP/OBS DSCHRG MGMT 30/<: CPT | Performed by: PHYSICIAN ASSISTANT

## 2018-12-01 PROCEDURE — 25000132 ZZH RX MED GY IP 250 OP 250 PS 637: Performed by: PHYSICIAN ASSISTANT

## 2018-12-01 RX ORDER — LEVOTHYROXINE SODIUM 125 MCG
125 TABLET ORAL DAILY
Qty: 90 TABLET | Refills: 3 | Status: SHIPPED | OUTPATIENT
Start: 2018-12-01 | End: 2020-01-30

## 2018-12-01 RX ADMIN — CALCITRIOL CAPSULES 0.25 MCG 0.25 MCG: 0.25 CAPSULE ORAL at 07:55

## 2018-12-01 RX ADMIN — SENNOSIDES AND DOCUSATE SODIUM 1 TABLET: 8.6; 5 TABLET ORAL at 07:55

## 2018-12-01 RX ADMIN — LEVOTHYROXINE SODIUM 125 MCG: 125 TABLET ORAL at 07:56

## 2018-12-01 NOTE — PLAN OF CARE
Problem: Patient Care Overview  Goal: Plan of Care/Patient Progress Review  1. Pt will be hemodynamically stable.  2. Pt and family will verbalize understanding of plan of care.  3. Pt will remain free of falls  4. Pain will be under control or minimal       D: Pt admitted with chest pain s/p coronary angiogram without cardiac disease. 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     I/A: Vital signs stable, afebrile, A&Ox4, sinus rhythm/sinus bradycardia. BP soft, but pt declined lightheadedness/dizziness. R radial site CDI, CMS intact. Endorsed mild discomfort at R radial site, declined intervention. Up independently. Voiding well. No complaints of nausea overnight.     P: Discharge home today. Discharge paperwork completed by evening nurse. Continue to monitor and notify MD with pertinent changes.

## 2018-12-01 NOTE — PLAN OF CARE
Problem: Patient Care Overview  Goal: Plan of Care/Patient Progress Review  1. Pt will be hemodynamically stable.  2. Pt and family will verbalize understanding of plan of care.  3. Pt will remain free of falls  4. Pain will be under control or minimal     Outcome: Improving  Shift summary [id number]    Pt presented from OSH with c/o chest pain. Pt sent to [facility] for angiogram which showed clear CORS. Pt arrived back from [facility] with right radial  Band in place with 12 ml inflation. Pt has denied any c/o pain. Pt's right arm CMS intact. Pt VSS. Pt has been in  SB/SR rates in the 60's. Pt's TR band completely deflated at 2200. Right wrist CDI, CMS intact, no bleeding or hematoma. Pt was going to discharge but due to long drive pt requested to stay night til am. Pt instructed on all discharge information regarding diet,activity,puncture site care and follow up appointments and tests. Pt instructed on new medications. Prescriptions faxed to home pharmacy in [place], [place].POC continue to monitor wrist and CMS overnight and pt to discharge in am.    Problem: Cardiac: ACS (Acute Coronary Syndrome) (Adult)  Goal: Signs and Symptoms of Listed Potential Problems Will be Absent, Minimized or Managed (Cardiac: ACS)  Signs and symptoms of listed potential problems will be absent, minimized or managed by discharge/transition of care (reference Cardiac: ACS (Acute Coronary Syndrome) (Adult) CPG).   Outcome: Improving   11/30/18 9106   Cardiac: ACS (Acute Coronary Syndrome)   Problems Assessed (Acute Coronary Syndrome) all   Problems Present (Acute Coronary Syn) none

## 2018-12-01 NOTE — PROGRESS NOTES
DISCHARGE   Discharged to: Home  Via: Automobile  Accompanied by: Sister  Discharge Instructions: principal diagnosis, major findings/procedures, diet, activity, medications, follow up appointments, when to call the MD, and what to watchout for (i.e. s/s of infection, increasing SOB, palpitations, Angina). Pt states understanding of all discharge instructions.   Prescriptions: No new prescriptions.  Follow Up Appointments: with PCP in 7 days, with cardiology as previously scheduled.  Belongings: All sent with pt. Pt verifies that they are taking all belongings with them.   IV: discontinued.   Telemetry: discontinued.   Pt exhibits understanding of above discharge instructions; all questions answered.  Discharge Paperwork: faxed to discharge planner.

## 2018-12-01 NOTE — PROCEDURES
PRELIMINARY CARDIAC CATH REPORT:     PROCEDURES PERFORMED:   1. Selective left and right coronary angiography    PHYSICIANS:  1. Attending Interventional Cardiology Staff: Dr. Mainor Garrido MD  2. Interventional Cardiology Fellow: Dr. Chas Lainez MD  3. Cardiology Fellow: Naman Bustillos [facility]    Consent obtained with discussion of risks.  All questions were answered. Sterile prep and procedure.    INDICATION:  Radha Mcdermott is a 53 year old female with prior obacco use who presents on an urgent basis for evaluation of unstable angina. TTE 11/30/18 with LVEF of 55-60% without RWMA or valvular abnormalities.   DESCRIPTION:  1. Location: Right radial artery   2. Access: Local anesthetic with lidocaine.  A micropuncture (21 g) needle with ultrasound guidance was used to establish vascular access using a modified Seldinger technique.  3. Sheath: 4Fr radial   4. Catheters: 4Fr Tiger  5. Fluoroscopy time of 3.4 min.  6. Estimated blood loss of <5 mL.  7. See below for procedure details.    MEDICATIONS:  1. Contrast 22 mL IV.  2. Conscious sedatio with fentanyl and midazolam as directed by the Attending Cardiologist for 20 minutes between 1910 and 1930.  3. Heparin, verapamil and nitroglycerin intra-arterial for radial artery spasm prophylaxis.    HEMODYNAMICS:  1. HR 66 bpm  2. Ao /71/83 mmHg    CORONARY ANGIOGRAM:   1. Both coronary arteries arise from their respective cusps.  2. Left dominant.  3. LM is a large caliber vessel that is free of angiographic evidence of disease.   4. Ramus intermedius is a moderate calibre vessel that is free of angiographic evidence of disease along its course.   5. LAD is a large caliber, type III vessel that has four diagonal branches.  The length of the LAD and the three diagonal branches are free of angiographic evidence of disease. D1-D3 are all small in caliber.  5. LCX is a large caliber, dominant vessel with three OM branches.  The length of the LCx and the OM1-3  branches are free of disease.  The LPDA is also free of disease.   6. RCA s a small caliber, non-dominant vessel with small caliber PLs.  The length of the RCA is free of angiographic evidence of disease.     COMPLICATIONS:  1. None    SUMMARY/DIAGNOSES:   1. Chest pain without epicardial coronary disease.  2. Consider microvascular disease and non-coronary causes of chest pain.       PLAN:   1. Bedrest per protocol.  2. Return to the primary inpatient team for further evaluation and management.    The Attending Interventional Cardiologist was present for the entire procedure.    Findings communicated to cross-cover team.    Please refer to the CVIS report for the final draft.      Naman Bustillos, [facility]   General Cardiology Fellow  Pager [phone]

## 2018-12-02 ENCOUNTER — PATIENT OUTREACH (OUTPATIENT)
Dept: CARE COORDINATION | Facility: CLINIC | Age: 53
End: 2018-12-02

## 2018-12-02 DIAGNOSIS — E89.2 POST-SURGICAL HYPOPARATHYROIDISM (H): Chronic | ICD-10-CM

## 2018-12-02 DIAGNOSIS — C73 PAPILLARY THYROID CARCINOMA (H): ICD-10-CM

## 2018-12-02 DIAGNOSIS — E89.0 POSTSURGICAL HYPOTHYROIDISM: Chronic | ICD-10-CM

## 2018-12-03 ENCOUNTER — TELEPHONE (OUTPATIENT)
Dept: FAMILY MEDICINE | Facility: CLINIC | Age: 53
End: 2018-12-03

## 2018-12-03 LAB — INTERPRETATION ECG - MUSE: NORMAL

## 2018-12-03 NOTE — PROGRESS NOTES
[facility]: Post-Discharge Note  SITUATION                                                      Admission:    Admission Date: 11/29/18   Reason for Admission: Chest pain with EKG changes   Discharge:   Discharge Date: 12/01/18  Discharge Diagnosis: Chest pain with EKG changes   Discharge Service: Cardiology    BACKGROUND                                                      53 year old female without significant cardiac risk factors who is admitted with 3 days of chest pain and EKG changes. Her workup is remarkable for a negative troponin x 2, EKG with non-specific ST depressions with biphasic TWI in V2-V3. She is transferred to [facility] for further workup for unstable angina. She was transferred on heparin gtt.  Her chest pain had both concerning and atypical features.     ASSESSMENT      Discharge Assessment  Patient reports symptoms are: Unchanged  Does the patient have all of their medications?: Yes  Does patient know what their new medications are for?: Yes  Does patient have a follow-up appointment scheduled?: No (Patient was given the number to schedule with cardiology )  Does patient have any other questions or concerns?: No    Post-op  Did the patient have surgery or a procedure: Yes    PLAN                                                      Outpatient Plan:      5-7 days with PCP   Follow up with your endocrinologist in the next 1-2 months   Follow up with cardiology as arranged    Clover Denny CMA

## 2018-12-03 NOTE — TELEPHONE ENCOUNTER
Unfortunately, Jenna Rey MD does not have any where to fit patient in for hospital follow up appointment this week. Please contact patient and schedule with an available provider. Thank you,    Elo Aguirre CMA

## 2018-12-05 NOTE — PROGRESS NOTES
Answers for HPI/ROS submitted by the patient on 12/10/2018   Chronic problems general questions HPI Form  PHQ9 TOTAL SCORE: 0  SHINE 7 TOTAL SCORE: 0    SUBJECTIVE:   Radha Mcdermott is a 53 year old female who presents to clinic today for the following health issues:      [facility]      Hospital Follow-up Visit:    Hospital/Nursing Home/IP Rehab Facility: [facility]  Date of Admission: 11/29/18  Date of Discharge: 11/30/18  Reason(s) for Admission: Chest pain            Problems taking medications regularly:  None       Medication changes since discharge: None       Problems adhering to non-medication therapy:  None    Summary of hospitalization:  [facility] discharge summary reviewed  Diagnostic Tests/Treatments reviewed.  Follow up needed: chest pain described as pleuritic in nature when we discussed it in clinic today.  She also was told that she has new pulmonary nodules.  Chest x-ray results that I can find do not reflect this however we have not done a CT scan for her since 2016 think that it would be wise to follow-up on the claims of her discharging provider.  Other Healthcare Providers Involved in Patient s Care:         Ongoing care with endocrinology further care as needed with specialist.  Update since discharge: fluctuating course.     Post Discharge Medication Reconciliation: discharge medications reconciled, continue medications without change.  Plan of care communicated with patient     Coding guidelines for this visit:  Type of Medical   Decision Making Face-to-Face Visit       within 7 Days of discharge Face-to-Face Visit        within 14 days of discharge   Moderate Complexity [id number]   High Complexity 27840 [id number]            Problem list and histories reviewed & adjusted, as indicated.  Additional history: as documented    Patient Active Problem List   Diagnosis     Dysmenorrhea     Contraceptive management     Hypocalcemia     Post-surgical hypoparathyroidism (H)      Disorder of magnesium metabolism     Papillary thyroid carcinoma (H)     Anxiety state     Postsurgical hypothyroidism     Iron deficiency anemia     CARDIOVASCULAR SCREENING; LDL GOAL LESS THAN 160     Health Care Home     Pulmonary nodules     Abdominal mass     Shoulder joint pain     Major depressive disorder, recurrent episode, mild (H)     Cystocele, midline     Class 1 obesity with serious comorbidity and body mass index (BMI) of 32.0 to 32.9 in adult, unspecified obesity type     Chest pain     Unstable angina (H)     S/P coronary angiogram     Past Surgical History:   Procedure Laterality Date     BIOPSY/REMOVAL, LYMPH NODE(S)  10/27/2009    Surgical resection of lymph nodes, neck.  Scar revision. [facility].     C THYROIDECTOMY  10/07/2005    Total thyroidectomy.  [facility]. Further lymph node resection in       BIOPSY/EXCISION LYMPH NODE OPEN SUPERFICIAL      removal of some more lymph nodes in the neck.       REMOVE TONSILS/ADENOIDS,12+ Y/O         Social History     Tobacco Use     Smoking status: Former Smoker     Packs/day: 0.50     Years: 15.00     Pack years: 7.50     Last attempt to quit: 3/1/1998     Years since quittin.7     Smokeless tobacco: Never Used     Tobacco comment: no smoking in the home   Substance Use Topics     Alcohol use: Yes     Comment: social     Family History   Problem Relation Age of Onset     Lipids Mother      Osteoporosis Mother      Cancer Father         skin     Cancer Paternal Grandfather         prostate cancer     Alzheimer Disease Paternal Grandmother      Heart Disease Paternal Grandmother      Cerebrovascular Disease Paternal Grandmother      Thyroid Disease Paternal Grandmother      Diabetes Maternal Grandmother      Thyroid Disease Maternal Grandmother         thyroid disease     Allergies Son         pcn     Cancer Maternal Aunt         breast - mid-30s         Current Outpatient Medications   Medication Sig Dispense Refill     atorvastatin  (LIPITOR) 10 MG tablet Take 1 tablet (10 mg) by mouth daily 90 tablet 1     calcitRIOL (ROCALTROL) 0.25 MCG capsule TAKE ONE CAPSULE BY MOUTH TWICE A  capsule 2     docusate sodium 100 MG tablet Take 100 mg by mouth 2 times daily as needed for constipation 60 tablet 11     EPINEPHrine (EPIPEN) 0.3 MG/0.3ML injection Inject 0.3 mg into the muscle once as needed for anaphylaxis       magnesium oxide (MAG-OX) 400 MG tablet Take 0.5 tablets (200 mg) by mouth 2 times daily 90 tablet 3     SYNTHROID 125 MCG tablet Take 1 tablet (125 mcg) by mouth daily 90 tablet 3     Allergies   Allergen Reactions     Bees      Chicken Allergy Swelling     Meat Extract Swelling     No Known Drug Allergies      Recent Labs   Lab Test 11/30/18  0630 11/29/18  1533 11/29/18  0921 09/24/18  1616  03/04/15  1302  09/20/11  0802   A1C  --   --   --  5.5  --  5.6  --   --    *  --   --   --   --   --   --  136*   HDL 63  --   --   --   --   --   --  69   TRIG 67  --   --   --   --   --   --  104   ALT  --   --  29  --   --   --   --   --    CR  --  0.98 1.02 1.01   < > 0.87   < >  --    GFRESTIMATED  --  59* 57* 58*   < > 69   < >  --    GFRESTBLACK  --  72 69 70   < > 84   < >  --    POTASSIUM  --  3.8 3.7 3.8   < > 3.4   < >  --    TSH 7.49*  --   --  0.14*   < > <0.01  Effective 7/30/2014, the reference range for this assay has changed to reflect   new instrumentation/methodology.  *   < > 10.40*    < > = values in this interval not displayed.      BP Readings from Last 3 Encounters:   12/10/18 92/66   12/01/18 103/42   11/29/18 116/63    Wt Readings from Last 3 Encounters:   12/10/18 88 kg (193 lb 14.4 oz)   12/01/18 86.2 kg (190 lb 1.6 oz)   11/29/18 88 kg (194 lb)                  Labs reviewed in EPIC    ROS:  Constitutional, HEENT, cardiovascular, pulmonary, GI, , musculoskeletal, neuro, skin, endocrine and psych systems are negative, except as otherwise noted.    OBJECTIVE:     BP 92/66 (BP Location: Left arm, Cuff  Size: Adult Regular)   Pulse 70   Temp 96.8  F (36  C) (Temporal)   Resp 16   Wt 88 kg (193 lb 14.4 oz)   SpO2 97%   BMI 31.30 kg/m     Body mass index is 31.3 kg/m .  GENERAL: healthy, alert and no distress  NECK: no adenopathy, no asymmetry, masses, or scars and trachea midline and normal to palpation  RESP: lungs clear to auscultation - no rales, rhonchi or wheezes -possible pleural friction rub to the left anterior chest is noted over the precordium.  Based on her report I suspect that she has some very mild pleurisy related pain in this region.  We reassured her that it would appear that her cardiovascular function is within normal limits and there are no problems with her coronary arteries.  CV: regular rate and rhythm, normal S1 S2, no S3 or S4, no murmur, click or rub, no peripheral edema and peripheral pulses strong  ABDOMEN: soft, nontender, no hepatosplenomegaly, no masses and bowel sounds normal  MS: no gross musculoskeletal defects noted, no edema -chest wall and possibly some very localized costochondritis mild is noted.  SKIN: no suspicious lesions or rashes to visible exam.  NEURO: Normal strength and tone, mentation intact and speech normal  PSYCH: mentation appears normal, affect normal/bright    Diagnostic Test Results:  No results found for this or any previous visit (from the past 24 hour(s)).    ASSESSMENT/PLAN:     1. Screen for colon cancer  Due soon  - GASTROENTEROLOGY ADULT REF PROCEDURE ONLY [facility] [phone]; [facility] Provider  - Colonoscopy [[id number]]; Future      2. Visit for screening mammogram  - MA SCREENING DIGITAL BILAT - Future  (s+30); Future    3. Screening for HIV (human immunodeficiency virus)  Declines    4. Need for prophylactic vaccination and inoculation against influenza  Has had as part of her work program    5. S/P coronary angiogram  6. Unstable angina (H)  7. Pulmonary nodules  Repeat CT scan sometime in the near future is desired.  Patient was told  she had new pulmonary nodules.  - CT Chest w/o Contrast; Future    8. Hyperlipidemia LDL goal <100  Advised starting medications as her LDL cholesterol is not at goal.  She is to continue to follow with endocrinology related to her thyroid.  - atorvastatin (LIPITOR) 10 MG tablet; Take 1 tablet (10 mg) by mouth daily  Dispense: 90 tablet; Refill: 1  - Lipid panel reflex to direct LDL Fasting; Future    9. Major depressive disorder, recurrent episode, mild (H)  Doing well, no new concerns.    ILSA Scott PA-C  [facility]  Answers for HPI/ROS submitted by the patient on 12/10/2018   Chronic problems general questions HPI Form  PHQ9 TOTAL SCORE: 0  SHINE 7 TOTAL SCORE: 0

## 2018-12-06 RX ORDER — MAGNESIUM OXIDE 400 MG/1
200 TABLET ORAL 2 TIMES DAILY
Qty: 90 TABLET | Refills: 3 | Status: SHIPPED | OUTPATIENT
Start: 2018-12-06 | End: 2020-01-30

## 2018-12-06 NOTE — TELEPHONE ENCOUNTER
magnesium oxide (MAG-OX) 400 MG tablet      Last Written Prescription Date:  12-22-17  Last Fill Quantity: 90,   # refills: 2  Last Office Visit : 9-24-18  Future Office visit:  none    Routing refill request to provider for review/approval because:  Per Physician.

## 2018-12-10 ENCOUNTER — OFFICE VISIT (OUTPATIENT)
Dept: FAMILY MEDICINE | Facility: OTHER | Age: 53
End: 2018-12-10
Payer: COMMERCIAL

## 2018-12-10 VITALS
OXYGEN SATURATION: 97 % | DIASTOLIC BLOOD PRESSURE: 66 MMHG | TEMPERATURE: 96.8 F | SYSTOLIC BLOOD PRESSURE: 92 MMHG | RESPIRATION RATE: 16 BRPM | WEIGHT: 193.9 LBS | BODY MASS INDEX: 31.3 KG/M2 | HEART RATE: 70 BPM

## 2018-12-10 DIAGNOSIS — Z12.11 SCREEN FOR COLON CANCER: ICD-10-CM

## 2018-12-10 DIAGNOSIS — Z12.31 VISIT FOR SCREENING MAMMOGRAM: ICD-10-CM

## 2018-12-10 DIAGNOSIS — Z11.4 SCREENING FOR HIV (HUMAN IMMUNODEFICIENCY VIRUS): ICD-10-CM

## 2018-12-10 DIAGNOSIS — Z98.890 S/P CORONARY ANGIOGRAM: Primary | ICD-10-CM

## 2018-12-10 DIAGNOSIS — F33.0 MAJOR DEPRESSIVE DISORDER, RECURRENT EPISODE, MILD (H): ICD-10-CM

## 2018-12-10 DIAGNOSIS — I20.0 UNSTABLE ANGINA (H): ICD-10-CM

## 2018-12-10 DIAGNOSIS — E78.5 HYPERLIPIDEMIA LDL GOAL <100: ICD-10-CM

## 2018-12-10 DIAGNOSIS — R91.8 PULMONARY NODULES: ICD-10-CM

## 2018-12-10 DIAGNOSIS — Z23 NEED FOR PROPHYLACTIC VACCINATION AND INOCULATION AGAINST INFLUENZA: ICD-10-CM

## 2018-12-10 PROCEDURE — 99214 OFFICE O/P EST MOD 30 MIN: CPT | Performed by: PHYSICIAN ASSISTANT

## 2018-12-10 RX ORDER — ATORVASTATIN CALCIUM 10 MG/1
10 TABLET, FILM COATED ORAL DAILY
Qty: 90 TABLET | Refills: 1 | Status: SHIPPED | OUTPATIENT
Start: 2018-12-10 | End: 2019-10-30

## 2018-12-10 ASSESSMENT — PATIENT HEALTH QUESTIONNAIRE - PHQ9
SUM OF ALL RESPONSES TO PHQ QUESTIONS 1-9: 0
SUM OF ALL RESPONSES TO PHQ QUESTIONS 1-9: 0

## 2018-12-10 ASSESSMENT — ANXIETY QUESTIONNAIRES
7. FEELING AFRAID AS IF SOMETHING AWFUL MIGHT HAPPEN: NOT AT ALL
GAD7 TOTAL SCORE: 0
5. BEING SO RESTLESS THAT IT IS HARD TO SIT STILL: NOT AT ALL
6. BECOMING EASILY ANNOYED OR IRRITABLE: NOT AT ALL
3. WORRYING TOO MUCH ABOUT DIFFERENT THINGS: NOT AT ALL
2. NOT BEING ABLE TO STOP OR CONTROL WORRYING: NOT AT ALL
GAD7 TOTAL SCORE: 0
1. FEELING NERVOUS, ANXIOUS, OR ON EDGE: NOT AT ALL
7. FEELING AFRAID AS IF SOMETHING AWFUL MIGHT HAPPEN: NOT AT ALL
4. TROUBLE RELAXING: NOT AT ALL
GAD7 TOTAL SCORE: 0

## 2018-12-10 ASSESSMENT — PAIN SCALES - GENERAL: PAINLEVEL: MILD PAIN (3)

## 2018-12-11 ENCOUNTER — HOSPITAL ENCOUNTER (OUTPATIENT)
Dept: MAMMOGRAPHY | Facility: CLINIC | Age: 53
Discharge: HOME | End: 2018-12-11
Attending: PHYSICIAN ASSISTANT | Admitting: PHYSICIAN ASSISTANT
Payer: COMMERCIAL

## 2018-12-11 ENCOUNTER — HOSPITAL ENCOUNTER (OUTPATIENT)
Dept: CT IMAGING | Facility: CLINIC | Age: 53
End: 2018-12-11
Attending: PHYSICIAN ASSISTANT
Payer: COMMERCIAL

## 2018-12-11 DIAGNOSIS — Z12.31 VISIT FOR SCREENING MAMMOGRAM: ICD-10-CM

## 2018-12-11 DIAGNOSIS — I20.0 UNSTABLE ANGINA (H): ICD-10-CM

## 2018-12-11 DIAGNOSIS — Z98.890 S/P CORONARY ANGIOGRAM: ICD-10-CM

## 2018-12-11 DIAGNOSIS — R91.8 PULMONARY NODULES: ICD-10-CM

## 2018-12-11 PROCEDURE — 71250 CT THORAX DX C-: CPT

## 2018-12-11 PROCEDURE — 77063 BREAST TOMOSYNTHESIS BI: CPT

## 2018-12-11 ASSESSMENT — PATIENT HEALTH QUESTIONNAIRE - PHQ9: SUM OF ALL RESPONSES TO PHQ QUESTIONS 1-9: 0

## 2018-12-11 ASSESSMENT — ANXIETY QUESTIONNAIRES: GAD7 TOTAL SCORE: 0

## 2018-12-13 ENCOUNTER — TELEPHONE (OUTPATIENT)
Dept: FAMILY MEDICINE | Facility: CLINIC | Age: 53
End: 2018-12-13

## 2018-12-13 NOTE — TELEPHONE ENCOUNTER
Left message for patient to return call to schedule colonoscopy or EGD. If Lisa or Nathalia are unavailable, please transfer to the surgery center.

## 2018-12-14 ENCOUNTER — TELEPHONE (OUTPATIENT)
Dept: FAMILY MEDICINE | Facility: OTHER | Age: 53
End: 2018-12-14

## 2018-12-14 DIAGNOSIS — R07.1 CHEST PAIN ON BREATHING: Primary | ICD-10-CM

## 2018-12-14 DIAGNOSIS — R91.8 GROUND GLASS OPACITY PRESENT ON IMAGING OF LUNG: ICD-10-CM

## 2018-12-14 DIAGNOSIS — R93.89 ABNORMAL CT OF THE CHEST: ICD-10-CM

## 2018-12-14 RX ORDER — CODEINE PHOSPHATE AND GUAIFENESIN 10; 100 MG/5ML; MG/5ML
1-2 SOLUTION ORAL EVERY 4 HOURS PRN
Qty: 120 ML | Refills: 0 | Status: SHIPPED | OUTPATIENT
Start: 2018-12-14 | End: 2019-10-30

## 2018-12-14 RX ORDER — AZITHROMYCIN 250 MG/1
TABLET, FILM COATED ORAL
Qty: 6 TABLET | Refills: 0 | Status: SHIPPED | OUTPATIENT
Start: 2018-12-14 | End: 2019-10-30

## 2019-01-10 ENCOUNTER — MYC MEDICAL ADVICE (OUTPATIENT)
Dept: FAMILY MEDICINE | Facility: OTHER | Age: 54
End: 2019-01-10

## 2019-01-10 DIAGNOSIS — R07.81 PLEURITIC PAIN: Primary | ICD-10-CM

## 2019-01-10 RX ORDER — PREDNISONE 20 MG/1
TABLET ORAL
Qty: 20 TABLET | Refills: 0 | Status: SHIPPED | OUTPATIENT
Start: 2019-01-10 | End: 2019-01-22

## 2019-01-10 NOTE — TELEPHONE ENCOUNTER
"[facility] to review and advise. Per LOV notes, \"7. Pulmonary nodules  Repeat CT scan sometime in the near future is desired.  Patient was told she had new pulmonary nodules. - CT Chest w/o Contrast; Future.\" Please clarify if there is a time frame you would like this completed. Per LOV to follow up in clinic around 03/10/2019. Cece Bob RN, BSN     Responded via [facility]. Cece Bob RN, BSN         "

## 2019-01-10 NOTE — TELEPHONE ENCOUNTER
As noted on the order for the CT scan it is due in March.     Future Order Information     Expected Expires      3/14/2019 (Approximate) 4           If she still struggling with this it would be wise for her to be seen once again.  Please assist her in making a follow-up appointment sooner rather than later.  Electronically signed:    Jose Scott PA-C

## 2019-03-13 ENCOUNTER — HOSPITAL ENCOUNTER (OUTPATIENT)
Dept: CT IMAGING | Facility: CLINIC | Age: 54
Discharge: HOME | End: 2019-03-13
Attending: PHYSICIAN ASSISTANT | Admitting: PHYSICIAN ASSISTANT
Payer: COMMERCIAL

## 2019-03-13 DIAGNOSIS — R93.89 ABNORMAL CT OF THE CHEST: ICD-10-CM

## 2019-03-13 DIAGNOSIS — R07.1 CHEST PAIN ON BREATHING: ICD-10-CM

## 2019-03-13 DIAGNOSIS — R91.8 GROUND GLASS OPACITY PRESENT ON IMAGING OF LUNG: ICD-10-CM

## 2019-03-13 PROCEDURE — 71250 CT THORAX DX C-: CPT

## 2019-03-13 RX ORDER — IOPAMIDOL 755 MG/ML
500 INJECTION, SOLUTION INTRAVASCULAR ONCE
Status: DISCONTINUED | OUTPATIENT
Start: 2019-03-13 | End: 2019-03-13 | Stop reason: CLARIF

## 2019-03-21 ENCOUNTER — TELEPHONE (OUTPATIENT)
Dept: FAMILY MEDICINE | Facility: CLINIC | Age: 54
End: 2019-03-21

## 2019-03-21 NOTE — TELEPHONE ENCOUNTER
Summary:    Patient is due/failing the following:   COLONOSCOPY and PAP    Action needed:   Patient needs office visit for PAP. and schedule a colonoscopy or complete a FIT test     Type of outreach:    Sent [employer] message.    Questions for provider review:    None                                                                                                                                    Tonya Guaman     Chart routed to Care Team .          Panel Management Review      Patient has the following on her problem list:     Depression / Dysthymia review    Measure:  Needs PHQ-9 score of 4 or less during index window.  Administer PHQ-9 and if score is 5 or more, send encounter to provider for next steps.        PHQ-9 SCORE 11/20/2015 5/9/2016 12/10/2018   PHQ-9 Total Score - - -   PHQ-9 Total Score MyChart - 9 (Mild depression) 0   PHQ-9 Total Score 6 - 0       If PHQ-9 recheck is 5 or more, route to provider for next steps.    Patient is due for:  PHQ9      Composite cancer screening  Chart review shows that this patient is due/due soon for the following Pap Smear and Colonoscopy

## 2019-06-02 DIAGNOSIS — E20.89 OTHER HYPOPARATHYROIDISM (H): ICD-10-CM

## 2019-06-04 RX ORDER — CALCITRIOL 0.25 UG/1
0.25 CAPSULE, LIQUID FILLED ORAL 2 TIMES DAILY
Qty: 180 CAPSULE | Refills: 3 | Status: SHIPPED | OUTPATIENT
Start: 2019-06-04 | End: 2020-02-04 | Stop reason: DRUGHIGH

## 2019-06-04 NOTE — TELEPHONE ENCOUNTER
calcitRIOL (ROCALTROL) 0.25 MCG capsule        Last Written Prescription Date:  12/22/2017  Last Fill Quantity: 180,   # refills: 2  Last Office Visit : 09/24/2018  Future Office visit:  none    Routing refill request to provider for review/approval because: per protocol

## 2019-06-24 ENCOUNTER — TELEPHONE (OUTPATIENT)
Dept: FAMILY MEDICINE | Facility: CLINIC | Age: 54
End: 2019-06-24

## 2019-06-24 NOTE — TELEPHONE ENCOUNTER
Summary:    Patient is due/failing the following:   COLONOSCOPY, PAP, PHQ9 and PHYSICAL    Action needed:   Patient needs office visit for Physical and PAP., Patient needs to do PHQ9., Patient needs fasting lab only appointment and schedule a colonoscopy or  Complete a FIT test     Type of outreach:    Phone, left message for patient to call back.     Questions for provider review:    None                                                                                                                                    Tonya Guaman     Chart routed to Care Team .          Panel Management Review      Patient has the following on her problem list:     Depression / Dysthymia review    Measure:  Needs PHQ-9 score of 4 or less during index window.  Administer PHQ-9 and if score is 5 or more, send encounter to provider for next steps.      PHQ-9 SCORE 11/20/2015 5/9/2016 12/10/2018   PHQ-9 Total Score - - -   PHQ-9 Total Score MyChart - 9 (Mild depression) 0   PHQ-9 Total Score 6 - 0       If PHQ-9 recheck is 5 or more, route to provider for next steps.    Patient is due for:  PHQ9      Composite cancer screening  Chart review shows that this patient is due/due soon for the following Pap Smear and Colonoscopy

## 2019-06-24 NOTE — LETTER
[address] [address]  Phone: [phone]  Fax: [phone]  July 1, 2019      Radha Mcdermott  [address] [place] [id number]      Dear Radha,    We care about your health and have reviewed your health plan including your medical conditions, medications, and lab results.  Based on this review, it is recommended that you follow up regarding the following health topic(s):  -Colon Cancer Screening  -Cervical Cancer Screening  -Wellness (Physical) Visit     We recommend you take the following action(s):  -schedule a COLONOSCOPY to look for colon cancer (due every 10 years or 5 years in higher risk situations.)  Colonoscopies can prevent 90-95% of colon cancer deaths.  Problem lesions can be removed before they ever become cancer.  If you do not wish to do a colonoscopy or cannot afford to do one at this time, there is another option called a Fecal Immunochemical Occult Blood Test (FIT) a take home stool sample kit.  It does not replace the colonoscopy for colorectal cancer screening, but it can detect hidden bleeding in the lower colon.  It does need to be repeated every year and if a positive result is obtained, you would be referred for a colonoscopy.  If you have completed either one of these tests at another facility, please have the records sent to our clinic for our records.  -schedule a PAP SMEAR EXAM which is due.  Please disregard this reminder if you have had this exam elsewhere within the last year.  It would be helpful for us to have a copy of your recent pap smear report to update your records.     Please call us at [phone] (or use [employer]) to address the above recommendations.     Thank you for trusting [facility] and we appreciate the opportunity to serve you.  We look forward to supporting your healthcare needs in the future.    Healthy Regards,    Your Health Care Team  [facility]

## 2019-09-28 ENCOUNTER — HEALTH MAINTENANCE LETTER (OUTPATIENT)
Age: 54
End: 2019-09-28

## 2019-10-30 ENCOUNTER — OFFICE VISIT (OUTPATIENT)
Dept: FAMILY MEDICINE | Facility: OTHER | Age: 54
End: 2019-10-30
Payer: COMMERCIAL

## 2019-10-30 VITALS
WEIGHT: 203 LBS | HEART RATE: 66 BPM | RESPIRATION RATE: 16 BRPM | DIASTOLIC BLOOD PRESSURE: 60 MMHG | OXYGEN SATURATION: 98 % | SYSTOLIC BLOOD PRESSURE: 102 MMHG | BODY MASS INDEX: 32.62 KG/M2 | HEIGHT: 66 IN | TEMPERATURE: 98 F

## 2019-10-30 DIAGNOSIS — M79.672 LEFT FOOT PAIN: Primary | ICD-10-CM

## 2019-10-30 DIAGNOSIS — M79.605 PAIN IN LATERAL LEFT LOWER EXTREMITY: ICD-10-CM

## 2019-10-30 DIAGNOSIS — G89.29 CHRONIC PAIN OF LEFT KNEE: ICD-10-CM

## 2019-10-30 DIAGNOSIS — M25.562 CHRONIC PAIN OF LEFT KNEE: ICD-10-CM

## 2019-10-30 PROCEDURE — 99213 OFFICE O/P EST LOW 20 MIN: CPT | Performed by: NURSE PRACTITIONER

## 2019-10-30 ASSESSMENT — MIFFLIN-ST. JEOR: SCORE: 1547.55

## 2019-10-30 NOTE — PROGRESS NOTES
Subjective     Radha Mcdermott is a 54 year old female who presents to clinic today for the following health issues:    HPI   Concern - Left leg and foot pain   Onset: about a year but worse lately     Description:   Started in the leg below the knee and would have swelling down to ankle. Now in the past few months it's involved the foot as well.  Sharp, throbbing. Foot feels like there is a stabbing pain to it. End of the day pt is pretty sore and pt is limping. Knee pain has now started as well.      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 She has been having left foot pain that is left lateral and radiate laterally to knee and is now having mild left patellar pain.she will have intermittent lateral lower extremty swelling this improves with elevation and rest.  She does not wear supportive socks. Supportive tennis shoe no insert.       Patient Active Problem List   Diagnosis     Dysmenorrhea     Contraceptive management     Hypocalcemia     Post-surgical hypoparathyroidism (H)     Disorder of magnesium metabolism     Papillary thyroid carcinoma (H)     Anxiety state     Postsurgical hypothyroidism     Iron deficiency anemia     CARDIOVASCULAR SCREENING; LDL GOAL LESS THAN 160     Health Care Home     Pulmonary nodules     Abdominal mass     Shoulder joint pain     Major depressive disorder, recurrent episode, mild (H)     Cystocele, midline     Class 1 obesity with serious comorbidity and body mass index (BMI) of 32.0 to 32.9 in adult, unspecified obesity type     Chest pain     Unstable angina (H)     S/P coronary angiogram     Abnormal CT of the chest     Ground glass opacity present on imaging of lung     Past Surgical History:   Procedure Laterality Date     BIOPSY/REMOVAL, LYMPH  NODE(S)  10/27/2009    Surgical resection of lymph nodes, neck.  Scar revision. [facility].     HC BIOPSY/EXCISION LYMPH NODE OPEN SUPERFICIAL  2007    removal of some more lymph nodes in the neck.      HC REMOVE TONSILS/ADENOIDS,14+ Y/O       HC THYROIDECTOMY  10/07/2005    Total thyroidectomy.  F-[facility]. Further lymph node resection in        Social History     Tobacco Use     Smoking status: Former Smoker     Packs/day: 0.50     Years: 15.00     Pack years: 7.50     Last attempt to quit: 3/1/1998     Years since quittin.6     Smokeless tobacco: Never Used     Tobacco comment: no smoking in the home   Substance Use Topics     Alcohol use: Yes     Comment: social     Family History   Problem Relation Age of Onset     Lipids Mother      Osteoporosis Mother      Cancer Father         skin     Dementia Father      Cancer Paternal Grandfather         prostate cancer     Alzheimer Disease Paternal Grandmother      Heart Disease Paternal Grandmother      Cerebrovascular Disease Paternal Grandmother      Thyroid Disease Paternal Grandmother      Diabetes Maternal Grandmother      Thyroid Disease Maternal Grandmother         thyroid disease     Allergies Son         pcn     Cancer Maternal Aunt         breast - mid-30s         Current Outpatient Medications   Medication Sig Dispense Refill     calcitRIOL (ROCALTROL) 0.25 MCG capsule Take 1 capsule (0.25 mcg) by mouth 2 times daily 180 capsule 3     docusate sodium 100 MG tablet Take 100 mg by mouth 2 times daily as needed for constipation 60 tablet 11     EPINEPHrine (EPIPEN) 0.3 MG/0.3ML injection Inject 0.3 mg into the muscle once as needed for anaphylaxis       magnesium oxide (MAG-OX) 400 MG tablet Take 0.5 tablets (200 mg) by mouth 2 times daily 90 tablet 3     SYNTHROID 125 MCG tablet Take 1 tablet (125 mcg) by mouth daily 90 tablet 3     Allergies   Allergen Reactions     Bees      Chicken Allergy Swelling     Meat Extract Swelling     No Known Drug  "Allergies      Recent Labs   Lab Test 11/30/18  0630 11/29/18  1533 11/29/18  0921 09/24/18  1616  03/04/15  1302  09/20/11  0802   A1C  --   --   --  5.5  --  5.6  --   --    *  --   --   --   --   --   --  136*   HDL 63  --   --   --   --   --   --  69   TRIG 67  --   --   --   --   --   --  104   ALT  --   --  29  --   --   --   --   --    CR  --  0.98 1.02 1.01   < > 0.87   < >  --    GFRESTIMATED  --  59* 57* 58*   < > 69   < >  --    GFRESTBLACK  --  72 69 70   < > 84   < >  --    POTASSIUM  --  3.8 3.7 3.8   < > 3.4   < >  --    TSH 7.49*  --   --  0.14*   < > <0.01  Effective 7/30/2014, the reference range for this assay has changed to reflect   new instrumentation/methodology.  *   < > 10.40*    < > = values in this interval not displayed.      BP Readings from Last 3 Encounters:   10/30/19 102/60   12/10/18 92/66   12/01/18 103/42    Wt Readings from Last 3 Encounters:   10/30/19 92.1 kg (203 lb)   12/10/18 88 kg (193 lb 14.4 oz)   12/01/18 86.2 kg (190 lb 1.6 oz)                    Reviewed and updated as needed this visit by Provider  Allergies  Meds  Problems  Med Hx  Surg Hx         Review of Systems   ROS COMP: Constitutional, HEENT, cardiovascular, pulmonary, gi and gu systems are negative, except as otherwise noted.      Objective    /60   Pulse 66   Temp 98  F (36.7  C) (Temporal)   Resp 16   Ht 1.676 m (5' 6\")   Wt 92.1 kg (203 lb)   SpO2 98%   BMI 32.77 kg/m    Body mass index is 32.77 kg/m .  Physical Exam   GENERAL: healthy, alert and no distress  NECK: no adenopathy, no asymmetry, masses, or scars and thyroid normal to palpation  RESP: lungs clear to auscultation - no rales, rhonchi or wheezes  CV: regular rate and rhythm, normal S1 S2, no S3 or S4, no murmur, click or rub, no peripheral edema and peripheral pulses strong  MS: Left Knee Exam: Inspection: No effusion, No quad atrophy  Tender: none  Active Range of Motion: full flexion, full extension, all " "normal  Strength: full     Lower Leg Left Exam: Inspection; mild lateral near ankle swelling, no ecchymosis  Palpation: Tender: distal 1/3 tibia  Strength: full      ANKLE left  Inspection:Swelling:lateral    Tender:lateral malleolus  Range of Motion:inversion:  painful    FOOT left  foot exam : Inspection Palpation:   Swelling: no swelling  Tender::peroneal tendon:  at lateral malleolus  Range of Motion:normal      Lumber/Thoracic Spine Exam: Tender:  none  Range of Motion:  full  Strength:  normal    SKIN: no suspicious lesions or rashes    Assessment & Plan     1. Left foot pain    - PODIATRY/FOOT & ANKLE SURGERY REFERRAL  - PHYSICAL THERAPY REFERRAL; Future    2. Chronic pain of left knee    - PHYSICAL THERAPY REFERRAL; Future    3. Pain in lateral left lower extremity    - PHYSICAL THERAPY REFERRAL; Future    Recommend further foot assessment through specialty. Will start PT discussed symptoms may be related to foot or lumbar or both. If no improvement or worsening will return to clinic 6 weeks see avs.      BMI:   Estimated body mass index is 32.77 kg/m  as calculated from the following:    Height as of this encounter: 1.676 m (5' 6\").    Weight as of this encounter: 92.1 kg (203 lb).   Weight management plan: Patient was referred to their PCP to discuss a diet and exercise plan.        Patient Instructions   Recommend elevating to heart level or higher 3-4 times per day, wear compression stockings at work.     See PT for treatment.     Follow up with Dr. Baig podiatry as discussed.    Return to clinic in 6 weeks if symptoms not improving        WANDA Rivera [facility]    "

## 2019-10-30 NOTE — PATIENT INSTRUCTIONS
Recommend elevating to heart level or higher 3-4 times per day, wear compression stockings at work.     See PT for treatment.     Follow up with Dr. Baig podiatry as discussed.    Return to clinic in 6 weeks if symptoms not improving

## 2019-10-31 ENCOUNTER — ANCILLARY PROCEDURE (OUTPATIENT)
Dept: GENERAL RADIOLOGY | Facility: CLINIC | Age: 54
End: 2019-10-31
Attending: PODIATRIST
Payer: COMMERCIAL

## 2019-10-31 ENCOUNTER — OFFICE VISIT (OUTPATIENT)
Dept: PODIATRY | Facility: CLINIC | Age: 54
End: 2019-10-31
Attending: NURSE PRACTITIONER
Payer: COMMERCIAL

## 2019-10-31 VITALS
BODY MASS INDEX: 32.62 KG/M2 | DIASTOLIC BLOOD PRESSURE: 80 MMHG | SYSTOLIC BLOOD PRESSURE: 112 MMHG | HEIGHT: 66 IN | WEIGHT: 203 LBS

## 2019-10-31 DIAGNOSIS — M79.672 LEFT FOOT PAIN: ICD-10-CM

## 2019-10-31 DIAGNOSIS — M72.2 PLANTAR FASCIITIS OF LEFT FOOT: Primary | ICD-10-CM

## 2019-10-31 DIAGNOSIS — I87.2 VENOUS STASIS DERMATITIS OF BOTH LOWER EXTREMITIES: ICD-10-CM

## 2019-10-31 PROCEDURE — 73630 X-RAY EXAM OF FOOT: CPT | Mod: TC

## 2019-10-31 PROCEDURE — 99204 OFFICE O/P NEW MOD 45 MIN: CPT | Performed by: PODIATRIST

## 2019-10-31 RX ORDER — DICLOFENAC SODIUM 75 MG/1
75 TABLET, DELAYED RELEASE ORAL 2 TIMES DAILY
Qty: 60 TABLET | Refills: 1 | Status: SHIPPED | OUTPATIENT
Start: 2019-10-31 | End: 2020-09-10

## 2019-10-31 ASSESSMENT — PAIN SCALES - GENERAL: PAINLEVEL: MODERATE PAIN (4)

## 2019-10-31 ASSESSMENT — MIFFLIN-ST. JEOR: SCORE: 1547.55

## 2019-10-31 NOTE — NURSING NOTE
Dispensed 1 Dorsal (Anterior) Night Splint, Size S/M, with [facility] agreement signed by patient. Marcela Landers CMA, October 31, 2019

## 2019-10-31 NOTE — LETTER
10/31/2019         RE: Radha Mcdermott  [address] [facility] [address]        Dear Colleague,    Thank you for referring your patient, Radha Mcdermott, to the [facility]. Please see a copy of my visit note below.    HPI:  Radha Mcdermott is a 54 year old female who is seen in consultation at the request of Tamera Chang, APRN, CNP    Pt presents for eval of:   (Onset, Location, L/R, Character, Treatments, Injury if yes)    XR Left foot today 10/31/2019     Ongoing lower Left leg and posterior Left achilles swelling. Lateral, dorsal base of toes and medial Left foot pain. No injury noted. Presents today with supportive athletics shoes. Burning in Left knee.  Constant, stabbing, throbbing, pain 4 - 10   Intermittent numbness with increase in activity or on treadmill or out walking.  Ibuprofen, foot massage with a machine, stretching, temporary relief    Off and on pain in left foot and leg since fall 2018 and arch since mid October.      Works as a [employer].    Weight management plan: Patient was referred to their PCP to discuss a diet and exercise plan.       ROS: 10 point ROS neg other than the symptoms noted above in the HPI.    Patient Active Problem List   Diagnosis     Dysmenorrhea     Contraceptive management     Hypocalcemia     Post-surgical hypoparathyroidism (H)     Disorder of magnesium metabolism     Papillary thyroid carcinoma (H)     Anxiety state     Postsurgical hypothyroidism     Iron deficiency anemia     CARDIOVASCULAR SCREENING; LDL GOAL LESS THAN 160     Health Care Home     Pulmonary nodules     Abdominal mass     Shoulder joint pain     Major depressive disorder, recurrent episode, mild (H)     Cystocele, midline     Class 1 obesity with serious comorbidity and body mass index (BMI) of 32.0 to 32.9 in adult, unspecified obesity type     Chest pain     Unstable angina (H)     S/P coronary angiogram     Abnormal CT of the  chest     Ground glass opacity present on imaging of lung       PAST MEDICAL HISTORY:   Past Medical History:   Diagnosis Date     Abnormal Papanicolaou smear of cervix and cervical HPV 12/03    ASCUS - repeat paps all ok.      Depressive disorder, not elsewhere classified      Dysmenorrhea      Fracture of arm age 14    right     Gestational diabetes      Papillary thyroid carcinoma (H) 10/05    5 cm, MF, + ETE, node+; 3 operations, 131I     Postsurgical hypoparathyroidism (H) 2005     Postsurgical hypothyroidism 2005     Unspecified contraceptive management      PAST SURGICAL HISTORY:   Past Surgical History:   Procedure Laterality Date     BIOPSY/REMOVAL, LYMPH NODE(S)  10/27/2009    Surgical resection of lymph nodes, neck.  Scar revision. [facility].     HC BIOPSY/EXCISION LYMPH NODE OPEN SUPERFICIAL  2007    removal of some more lymph nodes in the neck.      HC REMOVE TONSILS/ADENOIDS,12+ Y/O  1984     HC THYROIDECTOMY  10/07/2005    Total thyroidectomy.  F-[facility]. Further lymph node resection in 12/06     MEDICATIONS:   Current Outpatient Medications:      calcitRIOL (ROCALTROL) 0.25 MCG capsule, Take 1 capsule (0.25 mcg) by mouth 2 times daily, Disp: 180 capsule, Rfl: 3     diclofenac (VOLTAREN) 75 MG EC tablet, Take 1 tablet (75 mg) by mouth 2 times daily, Disp: 60 tablet, Rfl: 1     docusate sodium 100 MG tablet, Take 100 mg by mouth 2 times daily as needed for constipation, Disp: 60 tablet, Rfl: 11     magnesium oxide (MAG-OX) 400 MG tablet, Take 0.5 tablets (200 mg) by mouth 2 times daily, Disp: 90 tablet, Rfl: 3     SYNTHROID 125 MCG tablet, Take 1 tablet (125 mcg) by mouth daily, Disp: 90 tablet, Rfl: 3     EPINEPHrine (EPIPEN) 0.3 MG/0.3ML injection, Inject 0.3 mg into the muscle once as needed for anaphylaxis, Disp: , Rfl:   ALLERGIES:    Allergies   Allergen Reactions     Bees      Chicken Allergy Swelling     Meat Extract Swelling     No Known Drug Allergies      SOCIAL HISTORY:   Social History      Socioeconomic History     Marital status:      Spouse name: Mushtaq     Number of children: 2     Years of education: 12+     Highest education level: Not on file   Occupational History     Occupation: Pharm. Tech     Employer: [employer]   Social Needs     Financial resource strain: Not on file     Food insecurity:     Worry: Not on file     Inability: Not on file     Transportation needs:     Medical: Not on file     Non-medical: Not on file   Tobacco Use     Smoking status: Former Smoker     Packs/day: 0.50     Years: 15.00     Pack years: 7.50     Last attempt to quit: 3/1/1998     Years since quittin.6     Smokeless tobacco: Never Used     Tobacco comment: no smoking in the home   Substance and Sexual Activity     Alcohol use: Yes     Comment: social     Drug use: No     Sexual activity: Yes     Partners: Male     Birth control/protection: None   Lifestyle     Physical activity:     Days per week: Not on file     Minutes per session: Not on file     Stress: Not on file   Relationships     Social connections:     Talks on phone: Not on file     Gets together: Not on file     Attends Caodaism service: Not on file     Active member of club or organization: Not on file     Attends meetings of clubs or organizations: Not on file     Relationship status: Not on file     Intimate partner violence:     Fear of current or ex partner: Not on file     Emotionally abused: Not on file     Physically abused: Not on file     Forced sexual activity: Not on file   Other Topics Concern      Service No     Blood Transfusions No     Caffeine Concern No     Comment: occasional     Occupational Exposure No     Hobby Hazards No     Sleep Concern No     Stress Concern No     Weight Concern No     Special Diet No     Back Care No     Exercise Yes     Bike Helmet No     Seat Belt Yes     Self-Exams Yes     Parent/sibling w/ CABG, MI or angioplasty before 65F 55M? Not Asked   Social History Narrative     "Lives with son.     FAMILY HISTORY:   Family History   Problem Relation Age of Onset     Lipids Mother      Osteoporosis Mother      Cancer Father         skin     Dementia Father      Cancer Paternal Grandfather         prostate cancer     Alzheimer Disease Paternal Grandmother      Heart Disease Paternal Grandmother      Cerebrovascular Disease Paternal Grandmother      Thyroid Disease Paternal Grandmother      Diabetes Maternal Grandmother      Thyroid Disease Maternal Grandmother         thyroid disease     Allergies Son         pcn     Cancer Maternal Aunt         breast - mid-30s       EXAM:Vitals: /80 (BP Location: Left arm, Cuff Size: Adult Regular)   Ht 1.676 m (5' 6\")   Wt 92.1 kg (203 lb)   BMI 32.77 kg/m     BMI= Body mass index is 32.77 kg/m .    General appearance: Patient is alert and fully cooperative with history & exam.  No sign of distress is noted during the visit.     Psychiatric: Affect is pleasant & appropriate.  Patient appears motivated to improve health.     Respiratory: Breathing is regular & unlabored while sitting.     HEENT: Hearing is intact to spoken word.  Speech is clear.  No gross evidence of visual impairment that would impact ambulation.     Vascular: DP & PT 2/4 & regular bilaterally.  Mild varicosities and subtle pitting edema noted bilaterally.  There also appears to be some nonpitting edema.     Neurologic: Lower extremity sensation is intact to light touch.  No evidence of weakness in the lower extremities.  No evidence of neuropathy and negative tinel sign.     Dermatologic: Skin is intact to both lower extremities without significant lesions, rash or abrasion.  Normal texture turgor and tone. No paronychia or evidence of soft tissue infection is noted.    Musculoskeletal: Patient is ambulatory without assistive device or brace. Pain is noted with firm palpation along the medial band of the plantar fascia left foot most notably at the origination upon the calcaneus " not through the arch.  No pain with compression of the calcaneus medial to lateral or with palpation of the achilles, peroneal or posterior tibial tendons.  Slightly more than 0  of ankle joint dorsiflexion without crepitus or pain throughout the ankle, subtalar or midtarsal joints.  No pain or limitations throughout manual muscle strength testing plus 5/5 to all four quadrants bilateral.  No palpable edema noted.  High arched foot type noted.    Radiographs:  Elevated calcaneal inclination angle consistent with pes cavus. Varus position of calcaneus.       ASSESSMENT:       ICD-10-CM    1. Plantar fasciitis of left foot M72.2 ORTHOTICS REFERRAL     diclofenac (VOLTAREN) 75 MG EC tablet   2. Venous stasis dermatitis of both lower extremities I87.2        PLAN:  Reviewed patient's chart in [facility] and discussed etiology and treatment options.      Treatments:  10/31/2019  Obtained and interpreted radiographs.   Discontinue barefoot walking or unsupported walking in shoes without shank.  Dispensed written instructions to obtain appropriate shoe gear and/or OTC inserts.  Dispensed anterior night splint to use all night every night.  Prescription oral Voltaren for a short course. Discussed risks.  Prescription for custom molded orthotics 10/31/2019  Follow up in 4-5 weeks     Unrestricted work as a pharmacy tech at [employer]    Juanpablo Baig DPM      Again, thank you for allowing me to participate in the care of your patient.        Sincerely,        Juanpablo Baig DPM

## 2019-10-31 NOTE — PATIENT INSTRUCTIONS
Reliable shoe stores: To maximize your experience and provide the best possible fit.  Be sure to show them your foot concerns and tell them Dr. Baig sent you.      Stores listed in bold have only athletic shoes, and stores that are not bold are mostly casual or variety of shoes    [employer]  [address] [place] [address]  [phone]    [employer] [place]  [address] [place] [address]  [phone]     [employer]  [address] [place] [address]  [phone]    [employer]  [address] [facility] [address]  [phone]    [employer]  [address] [place], [place] [address] [phone]    [employer]  [address] [place] [address]  [phone]                         [employer] Locations:     [address] [place] [address]   [phone]     [address] [place] [address] [address] [place] [address]   [phone]     [address] [place] [address]   [phone]     [address] [facility] [place] [place] [address]   [phone]     [address] [facility] [place] [address]   [phone]     [address] [place] [address]   [phone]     [address] [facility] [address]   [place] [address]   [phone]     [address] [facility]   [place] [address]   [phone]             How to find reasonable shoes          The correct width    Correct Fitting    Correct Length      Foot Distortion    Posture Distortion                          Torsional Rigidity      Grasp behind the heel and underneath the foot and twist      Bad    Excessive torsion/twist in midfoot     Less torsion/twist in midfoot is better                   Heel Counter Rigidity      Grasp just above   midsole and squeeze      Bad    Soft heel counter      Good    Rigid Heel Counter      Flexion Rigidity      Grasp shoe and bend from forefoot to rearfoot                PLANTAR FASCIITIS  The  plantar fascia  is a  thick fibrous layer of tissue that covers the bones on the bottom of your foot. It supports the foot bones in an arched position.  Plantar fasciitis  is a painful inflammation of the plantar fascia due to overuse. This can develop gradually or suddenly. It usually affects one foot at a time but can affect both feet. Heel pain can be sharp and feel like a knife sticking in the bottom of your foot. Pain may occur after exercising, long distance jogging, stair climbing, long periods of standing, or after getting up from a seated position.  Risk factors include arthritis, diabetes, obesity or recent weight gain, flat-foot, high arch, wearing high heels or loose shoes or shoes with poor arch support.  Sudden changes in activity or shoe gear may contribute to symptoms.  Foot pain from this condition is usually worse in the morning and improves with walking. By the end of the day there may be a dull aching. Treatment requires improved support of feet, short-term rest and controlling inflammation. It may take up to nine months before all symptoms go away with the measures described below.  A steroid injection into the foot, or surgery may be needed if this is becomes long standing or severe.  HOME CARE  1. If you are overweight, lose weight to promote healing.  2. Choose supportive shoes (stiff through the shank) with good arch support and shock absorbency. Replace athletic shoes when they become worn out. Don t walk or run barefoot.  3. Shoe inserts are an important part of treatment. You can buy off-the-shelf shoe inserts inexpensively such as [employer].  The best ones are custom molded to your foot with a prescription.  4. Night splints keep the plantar fascia gently stretched while you sleep and will eliminate morning pain. Wear it ALL NIGHT EVERY NIGHT, or any time you sit for a long time.  5. Reduce by 10% or more the activities that stress the feet: jogging, prolonged standing or walking, high impact sports,  etc.  6. Stretch your feet. Gently flex your ankle by leaning into a wall or counter or drop your heel from a step.  Stretch two minutes of every hour you are awake.  7. Icing or massage may help heel pain. Apply an ice pack or frozen water or Coke bottle to the heel for 10-20 minutes as a preventive or after an acute flare of symptoms. You may repeat this as needed.   Follow up with your Doctor in 3 weeks as instructed.

## 2019-10-31 NOTE — PROGRESS NOTES
HPI:  Radha Mcdermott is a 54 year old female who is seen in consultation at the request of WANDA Martínez, CNP    Pt presents for eval of:   (Onset, Location, L/R, Character, Treatments, Injury if yes)    XR Left foot today 10/31/2019     Ongoing lower Left leg and posterior Left achilles swelling. Lateral, dorsal base of toes and medial Left foot pain. No injury noted. Presents today with supportive athletics shoes. Burning in Left knee.  Constant, stabbing, throbbing, pain 4 - 10   Intermittent numbness with increase in activity or on treadmill or out walking.  Ibuprofen, foot massage with a machine, stretching, temporary relief    Off and on pain in left foot and leg since fall 2018 and arch since mid October.      Works as a [employer].    Weight management plan: Patient was referred to their PCP to discuss a diet and exercise plan.       ROS: 10 point ROS neg other than the symptoms noted above in the HPI.    Patient Active Problem List   Diagnosis     Dysmenorrhea     Contraceptive management     Hypocalcemia     Post-surgical hypoparathyroidism (H)     Disorder of magnesium metabolism     Papillary thyroid carcinoma (H)     Anxiety state     Postsurgical hypothyroidism     Iron deficiency anemia     CARDIOVASCULAR SCREENING; LDL GOAL LESS THAN 160     Health Care Home     Pulmonary nodules     Abdominal mass     Shoulder joint pain     Major depressive disorder, recurrent episode, mild (H)     Cystocele, midline     Class 1 obesity with serious comorbidity and body mass index (BMI) of 32.0 to 32.9 in adult, unspecified obesity type     Chest pain     Unstable angina (H)     S/P coronary angiogram     Abnormal CT of the chest     Ground glass opacity present on imaging of lung       PAST MEDICAL HISTORY:   Past Medical History:   Diagnosis Date     Abnormal Papanicolaou smear of cervix and cervical HPV 12/03    ASCUS - repeat paps all ok.      Depressive  disorder, not elsewhere classified      Dysmenorrhea      Fracture of arm age 14    right     Gestational diabetes      Papillary thyroid carcinoma (H) 10/05    5 cm, MF, + ETE, node+; 3 operations, 131I     Postsurgical hypoparathyroidism (H) 2005     Postsurgical hypothyroidism 2005     Unspecified contraceptive management      PAST SURGICAL HISTORY:   Past Surgical History:   Procedure Laterality Date     BIOPSY/REMOVAL, LYMPH NODE(S)  10/27/2009    Surgical resection of lymph nodes, neck.  Scar revision. [facility].     HC BIOPSY/EXCISION LYMPH NODE OPEN SUPERFICIAL  2007    removal of some more lymph nodes in the neck.      HC REMOVE TONSILS/ADENOIDS,14+ Y/O  1984     HC THYROIDECTOMY  10/07/2005    Total thyroidectomy.  [facility]. Further lymph node resection in 12/06     MEDICATIONS:   Current Outpatient Medications:      calcitRIOL (ROCALTROL) 0.25 MCG capsule, Take 1 capsule (0.25 mcg) by mouth 2 times daily, Disp: 180 capsule, Rfl: 3     diclofenac (VOLTAREN) 75 MG EC tablet, Take 1 tablet (75 mg) by mouth 2 times daily, Disp: 60 tablet, Rfl: 1     docusate sodium 100 MG tablet, Take 100 mg by mouth 2 times daily as needed for constipation, Disp: 60 tablet, Rfl: 11     magnesium oxide (MAG-OX) 400 MG tablet, Take 0.5 tablets (200 mg) by mouth 2 times daily, Disp: 90 tablet, Rfl: 3     SYNTHROID 125 MCG tablet, Take 1 tablet (125 mcg) by mouth daily, Disp: 90 tablet, Rfl: 3     EPINEPHrine (EPIPEN) 0.3 MG/0.3ML injection, Inject 0.3 mg into the muscle once as needed for anaphylaxis, Disp: , Rfl:   ALLERGIES:    Allergies   Allergen Reactions     Bees      Chicken Allergy Swelling     Meat Extract Swelling     No Known Drug Allergies      SOCIAL HISTORY:   Social History     Socioeconomic History     Marital status:      Spouse name: Mushtaq     Number of children: 2     Years of education: 12+     Highest education level: Not on file   Occupational History     Occupation: Pharm. Tech     Employer: [employer]   Social Needs     Financial resource strain: Not on file     Food insecurity:     Worry: Not on file     Inability: Not on file     Transportation needs:     Medical: Not on file     Non-medical: Not on file   Tobacco Use     Smoking status: Former Smoker     Packs/day: 0.50     Years: 15.00     Pack years: 7.50     Last attempt to quit: 3/1/1998     Years since quittin.6     Smokeless tobacco: Never Used     Tobacco comment: no smoking in the home   Substance and Sexual Activity     Alcohol use: Yes     Comment: social     Drug use: No     Sexual activity: Yes     Partners: Male     Birth control/protection: None   Lifestyle     Physical activity:     Days per week: Not on file     Minutes per session: Not on file     Stress: Not on file   Relationships     Social connections:     Talks on phone: Not on file     Gets together: Not on file     Attends Taoist service: Not on file     Active member of club or organization: Not on file     Attends meetings of clubs or organizations: Not on file     Relationship status: Not on file     Intimate partner violence:     Fear of current or ex partner: Not on file     Emotionally abused: Not on file     Physically abused: Not on file     Forced sexual activity: Not on file   Other Topics Concern      Service No     Blood Transfusions No     Caffeine Concern No     Comment: occasional     Occupational Exposure No     Hobby Hazards No     Sleep Concern No     Stress Concern No     Weight Concern No     Special Diet No     Back Care No     Exercise Yes     Bike Helmet No     Seat Belt Yes     Self-Exams Yes     Parent/sibling w/ CABG, MI or angioplasty before 65F 55M? Not Asked   Social History Narrative    Lives with son.     FAMILY HISTORY:   Family History   Problem Relation Age of Onset     Lipids Mother      Osteoporosis Mother      Cancer Father         skin     Dementia Father      Cancer Paternal Grandfather         prostate cancer      "Alzheimer Disease Paternal Grandmother      Heart Disease Paternal Grandmother      Cerebrovascular Disease Paternal Grandmother      Thyroid Disease Paternal Grandmother      Diabetes Maternal Grandmother      Thyroid Disease Maternal Grandmother         thyroid disease     Allergies Son         pcn     Cancer Maternal Aunt         breast - mid-30s       EXAM:Vitals: /80 (BP Location: Left arm, Cuff Size: Adult Regular)   Ht 1.676 m (5' 6\")   Wt 92.1 kg (203 lb)   BMI 32.77 kg/m    BMI= Body mass index is 32.77 kg/m .    General appearance: Patient is alert and fully cooperative with history & exam.  No sign of distress is noted during the visit.     Psychiatric: Affect is pleasant & appropriate.  Patient appears motivated to improve health.     Respiratory: Breathing is regular & unlabored while sitting.     HEENT: Hearing is intact to spoken word.  Speech is clear.  No gross evidence of visual impairment that would impact ambulation.     Vascular: DP & PT 2/4 & regular bilaterally.  Mild varicosities and subtle pitting edema noted bilaterally.  There also appears to be some nonpitting edema.     Neurologic: Lower extremity sensation is intact to light touch.  No evidence of weakness in the lower extremities.  No evidence of neuropathy and negative tinel sign.     Dermatologic: Skin is intact to both lower extremities without significant lesions, rash or abrasion.  Normal texture turgor and tone. No paronychia or evidence of soft tissue infection is noted.    Musculoskeletal: Patient is ambulatory without assistive device or brace. Pain is noted with firm palpation along the medial band of the plantar fascia left foot most notably at the origination upon the calcaneus not through the arch.  No pain with compression of the calcaneus medial to lateral or with palpation of the achilles, peroneal or posterior tibial tendons.  Slightly more than 0  of ankle joint dorsiflexion without crepitus or pain " throughout the ankle, subtalar or midtarsal joints.  No pain or limitations throughout manual muscle strength testing plus 5/5 to all four quadrants bilateral.  No palpable edema noted.  High arched foot type noted.    Radiographs:  Elevated calcaneal inclination angle consistent with pes cavus. Varus position of calcaneus.       ASSESSMENT:       ICD-10-CM    1. Plantar fasciitis of left foot M72.2 ORTHOTICS REFERRAL     diclofenac (VOLTAREN) 75 MG EC tablet   2. Venous stasis dermatitis of both lower extremities I87.2        PLAN:  Reviewed patient's chart in [facility] and discussed etiology and treatment options.      Treatments:  10/31/2019  Obtained and interpreted radiographs.   Discontinue barefoot walking or unsupported walking in shoes without shank.  Dispensed written instructions to obtain appropriate shoe gear and/or OTC inserts.  Dispensed anterior night splint to use all night every night.  Prescription oral Voltaren for a short course. Discussed risks.  Prescription for custom molded orthotics 10/31/2019  Follow up in 4-5 weeks     Unrestricted work as a pharmacy tech at [employer]    Juanpablo Baig DPM

## 2019-11-05 ENCOUNTER — HOSPITAL ENCOUNTER (OUTPATIENT)
Dept: PHYSICAL THERAPY | Facility: CLINIC | Age: 54
Setting detail: THERAPIES SERIES
End: 2019-11-05
Attending: NURSE PRACTITIONER
Payer: COMMERCIAL

## 2019-11-05 DIAGNOSIS — G89.29 CHRONIC PAIN OF LEFT KNEE: ICD-10-CM

## 2019-11-05 DIAGNOSIS — M79.672 LEFT FOOT PAIN: ICD-10-CM

## 2019-11-05 DIAGNOSIS — M25.562 CHRONIC PAIN OF LEFT KNEE: ICD-10-CM

## 2019-11-05 DIAGNOSIS — M79.605 PAIN IN LATERAL LEFT LOWER EXTREMITY: ICD-10-CM

## 2019-11-05 PROCEDURE — 97161 PT EVAL LOW COMPLEX 20 MIN: CPT | Mod: GP

## 2019-11-05 PROCEDURE — 97110 THERAPEUTIC EXERCISES: CPT | Mod: GP

## 2019-11-07 NOTE — PROGRESS NOTES
11/05/19 1515   General Information   Type of Visit Initial OP Ortho PT Evaluation   Start of Care Date 11/05/19   Referring Physician WANDA Payne CNP    Patient/Family Goals Statement Relieve L foot pain.   Orders Evaluate and Treat   Date of Order 10/30/19   Certification Required? No   Medical Diagnosis Left foot pain M79.672 - Primary; Chronic pain of left knee M25.562, G89.29; Pain in lateral left lower extremity M79.605   Surgical/Medical history reviewed Yes   Precautions/Limitations no known precautions/limitations   Weight-Bearing Status - LUE full weight-bearing   Weight-Bearing Status - RUE full weight-bearing   Weight-Bearing Status - LLE full weight-bearing   Weight-Bearing Status - RLE full weight-bearing       Present No   Body Part(s)   Body Part(s) Ankle/Foot   Presentation and Etiology   Pertinent history of current problem (include personal factors and/or comorbidities that impact the POC) Patient presents with acute L foot pain starting 3 wks ago, no known ALDAIR. 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  She reports occasional R foot pain, however most recently L foot & knee pain as well as increased LE swelling. Patient reports she enjoys walking and hiking outside as well as going to the gym for exercise, however this has been limited d/t pain levels. Compression socks & night splint recommended by Dr. Baig seem to help. She was fitted for shoe inserts by orthotics and should get those by 11/20. PMH: anemia, depression, Total thyroidectomy 2005.   Impairments A. Pain;B. Decreased WB tolerance;C. Swelling;D. Decreased ROM;E. Decreased flexibility;F. Decreased strength and endurance;G. Impaired balance;H. Impaired gait;J. Burning;K. Numbness;L. Tingling   Functional Limitations perform activities of daily living;perform required work activities;perform desired leisure / sports activities   Symptom Location L foot  "  How/Where did it occur From insidious onset   Onset date of current episode/exacerbation 10/15/19   Chronicity New   Pain rating (0-10 point scale) Best (/10);Worst (/10)   Best (/10) 1-2   Worst (/10) 6-7   Pain quality A. Sharp;B. Dull;C. Aching;D. Burning   Frequency of pain/symptoms A. Constant   Pain/symptoms are: Worse during the night   Pain/symptoms exacerbated by B. Walking;K. Home tasks;L. Work tasks  (driving)   Pain/symptoms eased by C. Rest;J. Braces/supports  (night splint; massage)   Progression of symptoms since onset: Improved   Current / Previous Interventions   Diagnostic Tests: X-ray   X-ray Results Results   X-ray results Elevated calcaneal inclination angle consistent with pes cavus. Varus position of calcaneus.   Prior Level of Function   Prior Level of Function-Mobility IND   Prior Level of Function-ADLs IND   Current Level of Function   Current Community Support Family/friend caregiver  ()   Patient role/employment history A. 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   Home/community accessibility split level - difficult   Current equipment-Gait/Locomotion None   Current equipment-ADL None   Fall Risk Screen   Fall screen completed by PT   Have you fallen 2 or more times in the past year? No   Have you fallen and had an injury in the past year? No   Is patient a fall risk? No   System Outcome Measures   Outcome Measures   (LEFS: 57/80)   Ankle/Foot Objective Findings   Side (if bilateral, select both right and left) Right;Left   Observation patient alone at [facility], in no apparent distress. wearing athletic shoes.    Integumentary mild edema at posterior medial & lateral malleoli   Posture Normal   Gait/Locomotion mild decrease in stance time on L LE   Balance/ Proprioception (Single Leg Stance) 30\" B, pain 5-6/10 on  L LE   Foot Position In Standing Bilateral supination   Windlass Test Negative   Longitudinal Arch Angle " Test not formally measured- significant increased arch bilaterally (see xray)   Anterior Drawer Test normal   Posterior Drawer Test slight hypomobility of bilateral TC joint into DF   Talar Tilt Test Hypermobility into inversion   Ankle/Foot Special Tests Comments No decrease in symptoms with lumbar extension or prone lying.    Palpation tenderness at L posterior tib insertion (navicular), no tenderness at 5th met head   Accessory Motion/Joint Mobility Hypermobile L inversion/ Hypomobile L eversion & DF; Hypomobile L proximal tib-fib compared to R.    Right DF (Knee Ext) AROM 4   Right PF AROM 40   Right Calcanceal Inversion AROM 40   Right Calcaneal Eversion AROM 20   Right Great Toe Flexion AROM WFL   Right DF/Inversion Strength 5/5   Right DF/Eversion Strength 5/5   Right PF Strength 5/5   Right Gastroc (in WB) Flexibility Limitations noted   Left DF (Knee Ext) AROM 2   Left PF AROM 45   Left Calcanceal Inversion AROM 45, pain   Left Calcaneal Eversion AROM 20   Left Great Toe Flexion AROM WFL, no pain   Left DF/Inversion Strength 4/5   Left DF/Eversion Strength 4-/5   Left PF Strength 4/5   Left Gastroc (in WB) Flexibility Limitations noted ( > R)   Planned Therapy Interventions   Planned Therapy Interventions gait training;joint mobilization;manual therapy;neuromuscular re-education;ROM;strengthening;stretching   Planned Therapy Interventions Comment other interventions as indicated   Planned Modality Interventions   Planned Modality Interventions Cryotherapy;Electrical stimulation;Ultrasound   Planned Modality Interventions Comments other modalities as indicated   Clinical Impression   Criteria for Skilled Therapeutic Interventions Met yes, treatment indicated   PT Diagnosis Decreased L ankle strength, L talocrural joint mobility & ROM restrictions   Influenced by the following impairments pain, edema, strength, joint mobility, ROM, balance   Functional limitations due to impairments prolong ambulation,  "prolonged standing, work tasks, ADLs, hobbies   Clinical Presentation Evolving/Changing   Clinical Presentation Rationale Patient is a 54 y.o. female presenting with L foot and ankle pain. Patient exhibits decreased L ankle strength and decreased DF & eversion joint mobility when compared to R ankle. Patient with high arches and pain in bilateral feet upon prolonged standing and ambulation. Patient with tenderness at posterior tibialis insertion as well. Patient would benefit from skilled physical therapy intervention to address aforementioned deficits and limitations.    Clinical Decision Making (Complexity) Low complexity   Therapy Frequency 2 times/Week   Predicted Duration of Therapy Intervention (days/wks) 8 weeks   Risk & Benefits of therapy have been explained Yes   Patient, Family & other staff in agreement with plan of care Yes   Education Assessment   Preferred Learning Style Listening;Demonstration   Barriers to Learning No barriers   ORTHO GOALS   PT Ortho Eval Goals 1;2;3;4   Ortho Goal 1   Goal Identifier HEP   Goal Description Patient will report compliance with HEP at least 3 days per week in order to demonstrate improved self-management of pain.   Target Date 12/03/19   Ortho Goal 2   Goal Identifier Balance   Goal Description Patient will balance with L SLS x 30\" and no UE support, pain 1/10 at highest, in order to demonstrate improved balance for safe mobility and functional activities.   Target Date 12/31/19   Ortho Goal 3   Goal Identifier LEFS   Goal Description Patient will improve LEFS score by at least 20 points in order to demonstrate functional improvements.   Target Date 12/31/19   Ortho Goal 4   Goal Identifier Ambulation   Goal Description Patient will demonstrate ambulation on TM x10' at 2.5 mph without pain in order to demonstrate improved tolerance to prolonged ambulation.   Target Date 12/31/19   Total Evaluation Time   PT Dalia, Low Complexity Minutes ([id number]) 59     Thank you for " your referral.     Hayley Anand PT, DPT    [facility]    O: [phone]  E: [email or web address]

## 2019-12-03 ENCOUNTER — HOSPITAL ENCOUNTER (OUTPATIENT)
Dept: PHYSICAL THERAPY | Facility: CLINIC | Age: 54
Setting detail: THERAPIES SERIES
End: 2019-12-03
Attending: NURSE PRACTITIONER
Payer: COMMERCIAL

## 2019-12-03 PROCEDURE — 97140 MANUAL THERAPY 1/> REGIONS: CPT | Mod: GP | Performed by: PHYSICAL THERAPIST

## 2019-12-03 PROCEDURE — 97110 THERAPEUTIC EXERCISES: CPT | Mod: GP | Performed by: PHYSICAL THERAPIST

## 2019-12-12 ENCOUNTER — HOSPITAL ENCOUNTER (OUTPATIENT)
Dept: PHYSICAL THERAPY | Facility: CLINIC | Age: 54
Setting detail: THERAPIES SERIES
End: 2019-12-12
Attending: NURSE PRACTITIONER
Payer: COMMERCIAL

## 2019-12-12 PROCEDURE — 97035 APP MDLTY 1+ULTRASOUND EA 15: CPT | Mod: GP | Performed by: PHYSICAL THERAPIST

## 2019-12-12 PROCEDURE — 97110 THERAPEUTIC EXERCISES: CPT | Mod: GP | Performed by: PHYSICAL THERAPIST

## 2020-01-29 DIAGNOSIS — C73 PAPILLARY THYROID CARCINOMA (H): ICD-10-CM

## 2020-01-29 DIAGNOSIS — C73 MALIGNANT NEOPLASM OF THYROID GLAND (H): ICD-10-CM

## 2020-01-29 DIAGNOSIS — E89.0 POSTSURGICAL HYPOTHYROIDISM: ICD-10-CM

## 2020-01-29 DIAGNOSIS — E89.2 POST-SURGICAL HYPOPARATHYROIDISM (H): Chronic | ICD-10-CM

## 2020-01-30 NOTE — TELEPHONE ENCOUNTER
SYNTHROID 125MCG TABS ,MAGNESIUM OXIDE 400MG TABS    Last Written Prescription Date:  Synthroid 12/1/18 for #90 with 3 refills  Magnesium Oxide 12/6/18 #90 with 3 refills  Last Office Visit : 9/24/18  Future Office visit:  2/4/20    Routing refill request to provider for review/approval because:  Magnesium oxide not on the G, P or [facility] refill protocol or controlled substance  Over due and last TSH abnormal  Lab Test 11/30/18  0630   TSH 7.49*

## 2020-01-31 RX ORDER — LEVOTHYROXINE SODIUM 125 MCG
125 TABLET ORAL DAILY
Qty: 90 TABLET | Refills: 3 | Status: SHIPPED | OUTPATIENT
Start: 2020-01-31 | End: 2021-02-23

## 2020-01-31 RX ORDER — MAGNESIUM OXIDE 400 MG/1
200 TABLET ORAL 2 TIMES DAILY
Qty: 90 TABLET | Refills: 3 | Status: SHIPPED | OUTPATIENT
Start: 2020-01-31 | End: 2021-02-18

## 2020-02-04 ENCOUNTER — OFFICE VISIT (OUTPATIENT)
Dept: ENDOCRINOLOGY | Facility: CLINIC | Age: 55
End: 2020-02-04
Payer: COMMERCIAL

## 2020-02-04 VITALS
HEART RATE: 98 BPM | DIASTOLIC BLOOD PRESSURE: 70 MMHG | WEIGHT: 197.9 LBS | BODY MASS INDEX: 31.94 KG/M2 | SYSTOLIC BLOOD PRESSURE: 104 MMHG

## 2020-02-04 DIAGNOSIS — E89.0 POSTSURGICAL HYPOTHYROIDISM: Chronic | ICD-10-CM

## 2020-02-04 DIAGNOSIS — C73 PAPILLARY THYROID CARCINOMA (H): Primary | ICD-10-CM

## 2020-02-04 DIAGNOSIS — C73 PAPILLARY THYROID CARCINOMA (H): ICD-10-CM

## 2020-02-04 DIAGNOSIS — E89.2 POST-SURGICAL HYPOPARATHYROIDISM (H): Chronic | ICD-10-CM

## 2020-02-04 LAB
CALCIUM SERPL-MCNC: 8.4 MG/DL (ref 8.5–10.1)
CREAT SERPL-MCNC: 1.09 MG/DL (ref 0.52–1.04)
GFR SERPL CREATININE-BSD FRML MDRD: 58 ML/MIN/{1.73_M2}
PHOSPHATE SERPL-MCNC: 3.6 MG/DL (ref 2.5–4.5)
POTASSIUM SERPL-SCNC: 3.5 MMOL/L (ref 3.4–5.3)
T4 FREE SERPL-MCNC: 1.52 NG/DL (ref 0.76–1.46)
TSH SERPL DL<=0.005 MIU/L-ACNC: 0.38 MU/L (ref 0.4–4)

## 2020-02-04 RX ORDER — CALCITRIOL 0.5 UG/1
0.5 CAPSULE, LIQUID FILLED ORAL DAILY
Qty: 90 CAPSULE | Refills: 3 | Status: SHIPPED | OUTPATIENT
Start: 2020-02-04 | End: 2021-02-18

## 2020-02-04 ASSESSMENT — PAIN SCALES - GENERAL: PAINLEVEL: NO PAIN (0)

## 2020-02-04 NOTE — PROGRESS NOTES
Assessment   1.  Papillary carcinoma thyroid, multifocal, largest tumor 5 cm, + ETE, multiple node positive. She been treated with total thyroidectomy and left neck dissection (in 3 operations) and also 132 mCi 131.  -   Labs: Tg, TSH, Free T4 today and yearly  Neck US before 2021 appt  See me yearly     Addendum: Neck US done 6 months earlier than anticipated on 11/6/2020: negative.     Hypothyroidism due to thyroidectomy. As per # 1.  Treat to target TH < 0.4.     surgical hypoparathyroidism (partial) since first operation -  She is not taking calcium and she is intolerant of dairy  Standing orders for labs every 6 months.     Persistent thyroglobulinemia -current status unknown - labs today    pulmonary nodules, subcm, stable on serial imaging, most recent 6/16. I am noting this as it still may be relevant to # 1    Obesity -BM 32     Vero Arevalo MD  Endocrinology & Diabetes.      Cc/ HPI  54  year old seen for post surgical hypothyroidism, post surgical hypoparathyroidism and Papillary thyroid cancer.   She was last seen by me 9/2018.      Thyroid cancer has been treated with 3 operations  10/7/05   Thyroidectomy removing  left sided PCT 5.0 x 3.0 x 2.8 cm, extending through the capsule into parathyroidal soft tissues.  There was - Microscopic foci of papillary carcinoma in the right lobe and one LN removed was positive for pCT.  Her post operative course was  complicated by hypoparathyroidism.   132 mCi 131I 11/05.     12/28/06   Left modified neck dissection.    10/22/09 selective neck dissection to include level 7 and level 4 on the left.  Surgical pathology was positive for metastatic PCT in lymph nodes (see results)    We have the following tumor marker data:  9/9/05: Tg 290, JUAN DIEGO < 1, TSH   Operation # 1  10/28/05: Tg 131, JUAN DIEGO < 1, TSH 15.12  11/28/05: Tg 116, JUAN DIEGO <1, ,7  2/3/06: Tg 26.7, JUAN DIEGO < 1, TSH 14.43  6/2/06: Tg 2.2, JUAN DIEGO < 1, TSH 0.36  11/3/06: Tg 18, JUAN DIEGO < 1  Operation # 2  1/30/07: Tg  1.1, JUAN DIEGO < 1, TSH   4/17/07: Tg 2, JUAN DIEGO < 1, TSH   12/11/07: Tg 13.9, JUAN DIEGO < 1, TSH 42.02  1/8/08: Tg 2.7, JUAN DIEGO < 1, TSH   3/3/09: Tg 1.0, JUAN DIEGO < 0.4, TSH   4/24/09: Tg 4667, JUAN DIEGO < 0.4  Operation # 3  1/6/10: Tg 0.12, JUAN DIEGO < 0.4, TSH < 0.03  10/17/11: Tg 0.3, JUAN DIEGO < 20,   1/20/12: Tg 3.1, JUAN DIEGO < 20  3/21/12: Tg 0.1, JUAN DIEGO < 20, TSH 0.05  4/26/12: Tg 0.1, JUAN DIEGO < 20, TSH 0.02  10/26/12: Tg 3.5, JUAN DIEGO < 20, TSH 0.02 TBS -- supposedly thyrogen was given but the results and dates don't quite fit  4/26/13: Tg 0.2, JUAN DIEGO < 20, TSH 0.01  11/8/13: Tg 3.3, JUAN DIEGO < 20 TBS negative  2/10/14: Tg 0.1, JUAN DIEGO < 20  8/18/14: Tg 0.1, JUAN DIEGO < 20, TSH 0.00  11/21/14: Tg 4 ng/ml (athyrotic < 0.1, intact < 33 [facility] lab) JUAN DIEGO < 1.8,TSH 0.01  12/30/14: TSH 0.01  3/4/15; Tg 0.19, JUAN DIEGO < 0.4, TSH  <0.02 ; concurrent remeasure of 1/6/10 Tg 0.15  9/2/15: Tg 0.11, JUNA DIEGO < 0.4, TSH 0.01  5/26/16: Tg 0.27, JUAN DIEGO < 0.4, TSH 0.55  12/13/16: Tg 0.14, JUAN DIEGO < 0.4, TSH 0.07, free T4 1.32  6/13/17: Tg 0.17, JUAN DIEGO < 0.4, TSH 0.11-   9/24/18: Tg 0.18, JUAN DIEGO < 0.4, TSH 0.14 , free T 4 1.22,   10/10/18 24 hour urine calcium 80 mg/24 hours  11/30/18 SH 7.49 - seen by me today for lst time.   2/4/2020: Tg < 0.1, JUAN DIEGO < 0.4, TSH 0.38, free T4 1.52, Ca 8.4, phos 3.6, creatinine 1.09, K 3.5-       past imaging on PACs.    3/4/09 neck US: left level 7 lymph node, ? left level 4  5/27/09 PET/CT negative to my eye    Outside imaging reports:  10/26/12: 4 mCi 131I TBS negative  11/6/13: 4 mCi 131I TBS: negative  12/23/14: PET CT without contrast: focal left ovarian avidity of uncertain significance (also seen on outside PET 2010)-- t  6/7/16 US neck negative  6/7/16 CT chest: scattered subcm nodules  3/13/19 chest CT:       ROS   Further weight gain  Not feeling well x days  Cardiac: negative  Respiratory: negative; MENJIVAR with exercise.   GI: diarrhea; stomach; abdominal pain; no nausea or vomiting  No periods x ? A year.   Hot flashes and night sweats    PMH   Past Medical History:    Diagnosis Date     Abnormal Papanicolaou smear of cervix and cervical HPV 12/03    ASCUS - repeat paps all ok.      Depressive disorder, not elsewhere classified      Dysmenorrhea      Fracture of arm age 14    right     Gestational diabetes      Papillary thyroid carcinoma (H) 10/05    5 cm, MF, + ETE, node+; 3 operations, 131I     Postsurgical hypoparathyroidism (H) 2005     Postsurgical hypothyroidism 2005     Unspecified contraceptive management      Past Surgical History:   Procedure Laterality Date     BIOPSY/REMOVAL, LYMPH NODE(S)  10/27/2009    Surgical resection of lymph nodes, neck.  Scar revision. [facility].     HC BIOPSY/EXCISION LYMPH NODE OPEN SUPERFICIAL  2007    removal of some more lymph nodes in the neck.      HC REMOVE TONSILS/ADENOIDS,14+ Y/O  1984     HC THYROIDECTOMY  10/07/2005    Total thyroidectomy.  F[facility]. Further lymph node resection in 12/06       Current Outpatient Medications   Medication Sig Dispense Refill     calcitRIOL (ROCALTROL) 0.5 MCG capsule Take 1 capsule (0.5 mcg) by mouth daily 90 capsule 3     diclofenac (VOLTAREN) 75 MG EC tablet Take 1 tablet (75 mg) by mouth 2 times daily 60 tablet 1     docusate sodium 100 MG tablet Take 100 mg by mouth 2 times daily as needed for constipation 60 tablet 11     EPINEPHrine (EPIPEN) 0.3 MG/0.3ML injection Inject 0.3 mg into the muscle once as needed for anaphylaxis       magnesium oxide (MAG-OX) 400 MG tablet Take 0.5 tablets (200 mg) by mouth 2 times daily 90 tablet 3     SYNTHROID 125 MCG tablet Take 1 tablet (125 mcg) by mouth daily Separate iron or calcium-containing products and levothyroxine by at least 4 hours. 90 tablet 3       Family History   Problem Relation Age of Onset     Lipids Mother      Osteoporosis Mother      Cancer Father         skin     Dementia Father      Cancer Paternal Grandfather         prostate cancer     Alzheimer Disease Paternal Grandmother      Heart Disease Paternal Grandmother      Cerebrovascular  Disease Paternal Grandmother      Thyroid Disease Paternal Grandmother      Diabetes Maternal Grandmother      Thyroid Disease Maternal Grandmother         thyroid disease     Allergies Son         pcn     Cancer Maternal Aunt         breast - mid-30s       Social History     Socioeconomic History     Marital status:      Spouse name: Mushtaq     Number of children: 2     Years of education: 12+     Highest education level: Not on file   Occupational History     Occupation: Pharm. Tech     Employer: [employer]   Social Needs     Financial resource strain: Not on file     Food insecurity:     Worry: Not on file     Inability: Not on file     Transportation needs:     Medical: Not on file     Non-medical: Not on file   Tobacco Use     Smoking status: Former Smoker     Packs/day: 0.50     Years: 15.00     Pack years: 7.50     Last attempt to quit: 3/1/1998     Years since quittin.9     Smokeless tobacco: Never Used     Tobacco comment: no smoking in the home   Substance and Sexual Activity     Alcohol use: Yes     Comment: social     Drug use: No     Sexual activity: Yes     Partners: Male     Birth control/protection: None   Lifestyle     Physical activity:     Days per week: Not on file     Minutes per session: Not on file     Stress: Not on file   Relationships     Social connections:     Talks on phone: Not on file     Gets together: Not on file     Attends Catholic service: Not on file     Active member of club or organization: Not on file     Attends meetings of clubs or organizations: Not on file     Relationship status: Not on file     Intimate partner violence:     Fear of current or ex partner: Not on file     Emotionally abused: Not on file     Physically abused: Not on file     Forced sexual activity: Not on file   Other Topics Concern      Service No     Blood Transfusions No     Caffeine Concern No     Comment: occasional     Occupational Exposure No     Hobby Hazards No      Sleep Concern No     Stress Concern No     Weight Concern No     Special Diet No     Back Care No     Exercise Yes     Bike Helmet No     Seat Belt Yes     Self-Exams Yes     Parent/sibling w/ CABG, MI or angioplasty before 65F 55M? Not Asked   Social History Narrative    Lives with son.       Physical Exam young woman in NAD.  GENERAL: pleasant woman in NAD  /70   Pulse 98   Wt 89.8 kg (197 lb 14.4 oz)   BMI 31.94 kg/m    SKIN: normal color, temperature, texture   HEENT: PER, no scleral icterus, eyelid retraction, stare, lid lag,     NECK: healed surgical scar.  No palpable masses  LUNGS: clear to auscultation bilaterally.   CARDIAC: RRR, S1, S2 without murmurs, rubs or gallops.    BACK: normal spinal contour.    NEURO: Alert, responds appropriately to questions,  moves all extremities, gait normal, no tremor of the outstretched hand    Results for MELONY LOPEZ (MRN [id number]) as of 2/4/2020 14:17   Ref. Range 2/4/2020 13:38   Potassium Latest Ref Range: 3.4 - 5.3 mmol/L 3.5   Creatinine Latest Ref Range: 0.52 - 1.04 mg/dL 1.09 (H)   GFR Estimate Latest Ref Range: >60 mL/min/1.73_m2 58 (L)   GFR Estimate If Black Latest Ref Range: >60 mL/min/1.73_m2 67   Calcium Latest Ref Range: 8.5 - 10.1 mg/dL 8.4 (L)   Phosphorus Latest Ref Range: 2.5 - 4.5 mg/dL 3.6   T4 Free Latest Ref Range: 0.76 - 1.46 ng/dL 1.52 (H)   TSH Latest Ref Range: 0.40 - 4.00 mU/L 0.38 (L)       ENDO THYROID LABS-[facility] Latest Ref Rng & Units 11/30/2018 9/24/2018   TSH 0.40 - 4.00 mU/L 7.49 (H) 0.14 (L)   T4 FREE 0.76 - 1.46 ng/dL 0.85 1.22     ENDO CALCIUM LABS-[facility] Latest Ref Rng & Units 11/29/2018   CALCIUM IONIZED 4.4 - 5.2 mg/dL    CALCIUM IONIZED WHOLE BLOOD 4.4 - 5.2 mg/dL    PHOSPHOROUS 2.5 - 4.5 mg/dL    MAGNESIUM 1.6 - 2.3 mg/dL    ALBUMIN 3.4 - 5.0 g/dL    BUN 7 - 30 mg/dL 17   CREATININE 0.52 - 1.04 mg/dL 0.98   PARATHYROID HORMONE INTACT 12 - 72 pg/mL    ALKPHOS 40 - 150 U/L    VITAMIN D 1, 25     25 OH VIT D2 ug/L    25 OH  VIT D3 ug/L    25 OH VIT D TOTAL 20 - 75 ug/L    VITAMIN D DEFICIENCY SCREENING 20 - 75 ug/L    PROTEIN, TOTAL 6.8 - 8.8 g/dL    CALCIUM URINE G/24 H 0.10 - 0.30 g/24 h    CALCIUM URINE G/G CR g/g Cr    CALCIUM URINE MG/DL mg/dL    LIPASE 73 - 393 U/L      ENDO CALCIUM LABS-[facility] Latest Ref Rng & Units 11/29/2018   CALCIUM IONIZED 4.4 - 5.2 mg/dL    CALCIUM IONIZED WHOLE BLOOD 4.4 - 5.2 mg/dL 4.3 (L)   PHOSPHOROUS 2.5 - 4.5 mg/dL 3.1   MAGNESIUM 1.6 - 2.3 mg/dL 2.1   ALBUMIN 3.4 - 5.0 g/dL 3.8   BUN 7 - 30 mg/dL 19   CREATININE 0.52 - 1.04 mg/dL 1.02   PARATHYROID HORMONE INTACT 12 - 72 pg/mL    ALKPHOS 40 - 150 U/L 71   VITAMIN D 1, 25     25 OH VIT D2 ug/L    25 OH VIT D3 ug/L    25 OH VIT D TOTAL 20 - 75 ug/L    VITAMIN D DEFICIENCY SCREENING 20 - 75 ug/L    PROTEIN, TOTAL 6.8 - 8.8 g/dL 7.9   CALCIUM URINE G/24 H 0.10 - 0.30 g/24 h    CALCIUM URINE G/G CR g/g Cr    CALCIUM URINE MG/DL mg/dL    LIPASE 73 - 393 U/L 189     ENDO CALCIUM LABS-[facility] Latest Ref Rng & Units 10/10/2018   CALCIUM IONIZED 4.4 - 5.2 mg/dL    CALCIUM IONIZED WHOLE BLOOD 4.4 - 5.2 mg/dL    PHOSPHOROUS 2.5 - 4.5 mg/dL    MAGNESIUM 1.6 - 2.3 mg/dL    ALBUMIN 3.4 - 5.0 g/dL    BUN 7 - 30 mg/dL    CREATININE 0.52 - 1.04 mg/dL    PARATHYROID HORMONE INTACT 12 - 72 pg/mL    ALKPHOS 40 - 150 U/L    VITAMIN D 1, 25     25 OH VIT D2 ug/L    25 OH VIT D3 ug/L    25 OH VIT D TOTAL 20 - 75 ug/L    VITAMIN D DEFICIENCY SCREENING 20 - 75 ug/L    PROTEIN, TOTAL 6.8 - 8.8 g/dL    CALCIUM URINE G/24 H 0.10 - 0.30 g/24 h 0.08 (L)   CALCIUM URINE G/G CR g/g Cr 0.11   CALCIUM URINE MG/DL mg/dL 6.8   LIPASE 73 - 393 U/L      ENDO CALCIUM LABS-[facility] Latest Ref Rng & Units 9/24/2018   CALCIUM IONIZED 4.4 - 5.2 mg/dL    CALCIUM IONIZED WHOLE BLOOD 4.4 - 5.2 mg/dL    PHOSPHOROUS 2.5 - 4.5 mg/dL 3.0   MAGNESIUM 1.6 - 2.3 mg/dL    ALBUMIN 3.4 - 5.0 g/dL    BUN 7 - 30 mg/dL    CREATININE 0.52 - 1.04 mg/dL 1.01   PARATHYROID HORMONE INTACT 12 - 72 pg/mL    ALKPHOS 40 - 150  U/L    VITAMIN D 1, 25     25 OH VIT D2 ug/L <5   25 OH VIT D3 ug/L 35   25 OH VIT D TOTAL 20 - 75 ug/L <40   VITAMIN D DEFICIENCY SCREENING 20 - 75 ug/L    PROTEIN, TOTAL 6.8 - 8.8 g/dL    CALCIUM URINE G/24 H 0.10 - 0.30 g/24 h    CALCIUM URINE G/G CR g/g Cr    CALCIUM URINE MG/DL mg/dL    LIPASE 73 - 393 U/L        CT CHEST WITHOUT CONTRAST  3/13/2019 10:21 AM     HISTORY:  Chest wall pain. Shortness of breath. Chest pain on  breathing. Abnormal CT of the chest. Groundglass opacity present on  imaging of lung. History of thyroid cancer, thyroidectomy, lymph node  excision, and pneumonia.     TECHNIQUE: Scans obtained from the apices through the diaphragm  without IV contrast. Radiation dose for this scan was reduced using  automated exposure  control, adjustment of the mA and/or kV according to patient size, or  iterative reconstruction technique.     COMPARISON: CT chest dated 12/11/2018 and 6/7/2016.     FINDINGS: Groundglass density in the anterolateral left lower lobe  (image 198 series 6) is unchanged since the prior CT dated 12/11/2018,  but is new since the prior CT dated 6/7/2016. This could represent  scarring or, less likely, infiltrate. This could be followed with CT  in approximately six months to ensure stability or resolution.     0.3 cm diameter nodular density along the lateral aspect left major  fissure (image 103 series 6). It is stable since the prior CT dated  6/7/2016 and is benign. No other significant nodularity is identified.  Lungs are otherwise clear. No pneumothorax or significant pleural  fluid collection.     Heart is normal in size. No mediastinal, hilar, or axillary  lymphadenopathy is identified. No significant atherosclerotic changes  are seen in the thoracic aorta. Visualized portions of the thyroid are  unremarkable.     A few scattered diverticuli are seen in the visualized portions of the  colon. There are mild nonaneurysmal atherosclerotic calcifications of  the aorta.  Visualized portions of the upper abdominal contents are  otherwise unremarkable.     No aggressive osseous lesions or fractures are identified.                                                                      IMPRESSION:  1. Stable groundglass density in the left lower lung lobe likely  represents scarring or atelectasis. Inflammatory and neoplastic  processes are considered less likely. Recommend follow-up CT in  approximately six months to ensure stability.  2. No other significant changes.     HENRY MCRAE MD      ULTRASOUND HEAD AND NECK SOFT TISSUE 11/6/2020 3:30 PM     CLINICAL HISTORY: Papillary thyroid carcinoma (H). Status post  thyroidectomy.     TECHNIQUE: Routine.     COMPARISON: 6/7/2016.     FINDINGS: The exam consists of a few static images and otherwise  largely cine clips. There are a few small scattered lymph nodes  identified along both sides of the neck. These lymph nodes are not  pathologically enlarged.                                                                      IMPRESSION:  1.  The patient is status post thyroidectomy.  2.  Small scattered lymph nodes identified along both sides of the  neck, without pathologically enlarged lymph nodes. No findings  suspicious for recurrent or metastatic disease in the neck.     ETIENNE HERNANDEZ MD

## 2020-02-04 NOTE — LETTER
2/4/2020     RE: Radha Mcdermott  [address] [facility] [address]     Dear Colleague,    Thank you for referring your patient, Radha Mcdermott, to the [facility] ENDOCRINOLOGY at [facility]. Please see a copy of my visit note below.     Assessment   1.  Papillary carcinoma thyroid, multifocal, largest tumor 5 cm, + ETE, multiple node positive. She been treated with total thyroidectomy and left neck dissection (in 3 operations) and also 132 mCi 131.  -   Labs: Tg, TSH, Free T4 today and yearly  Neck US before 2021 appt  See me yearly     Hypothyroidism due to thyroidectomy. As per # 1.  Treat to target TH < 0.4.     surgical hypoparathyroidism (partial) since first operation -  She is not taking calcium and she is intolerant of dairy  Standing orders for labs every 6 months.     Persistent thyroglobulinemia -current status unknown - labs today    pulmonary nodules, subcm, stable on serial imaging, most recent 6/16. I am noting this as it still may be relevant to # 1    Obesity -BM 32     Vero Arevalo MD  Endocrinology & Diabetes.      Cc/ HPI  54  year old seen for post surgical hypothyroidism, post surgical hypoparathyroidism and Papillary thyroid cancer.   She was last seen by me 9/2018.      Thyroid cancer has been treated with 3 operations  10/7/05   Thyroidectomy removing  left sided PCT 5.0 x 3.0 x 2.8 cm, extending through the capsule into parathyroidal soft tissues.  There was - Microscopic foci of papillary carcinoma in the right lobe and one LN removed was positive for pCT.  Her post operative course was  complicated by hypoparathyroidism.   132 mCi 131I 11/05.     12/28/06   Left modified neck dissection.    10/22/09 selective neck dissection to include level 7 and level 4 on the left.  Surgical pathology was positive for metastatic PCT in lymph nodes (see results)    We have the following tumor marker data:  9/9/05: Tg 290, JUAN DIEGO < 1, TSH   Operation #  1  10/28/05: Tg 131, JUAN DIEGO < 1, TSH 15.12  11/28/05: Tg 116, JUAN DIEGO <1, ,7  2/3/06: Tg 26.7, JUAN DIEGO < 1, TSH 14.43  6/2/06: Tg 2.2, JUAN DIEGO < 1, TSH 0.36  11/3/06: Tg 18, JUAN DIEGO < 1  Operation # 2  1/30/07: Tg 1.1, JUAN DIEGO < 1, TSH   4/17/07: Tg 2, JUAN DIEGO < 1, TSH   12/11/07: Tg 13.9, JUAN DIEGO < 1, TSH 42.02  1/8/08: Tg 2.7, JUAN DIEGO < 1, TSH   3/3/09: Tg 1.0, JUAN DIEGO < 0.4, TSH   4/24/09: Tg 4667, JUAN DIEGO < 0.4  Operation # 3  1/6/10: Tg 0.12, JUAN DIEGO < 0.4, TSH < 0.03  10/17/11: Tg 0.3, JUAN DIEGO < 20,   1/20/12: Tg 3.1, JUAN DIEGO < 20  3/21/12: Tg 0.1, JUAN DIEGO < 20, TSH 0.05  4/26/12: Tg 0.1, JUAN DIEGO < 20, TSH 0.02  10/26/12: Tg 3.5, JUAN DIEGO < 20, TSH 0.02 TBS -- supposedly thyrogen was given but the results and dates don't quite fit  4/26/13: Tg 0.2, JUAN DIEGO < 20, TSH 0.01  11/8/13: Tg 3.3, JUAN DIEGO < 20 TBS negative  2/10/14: Tg 0.1, JUAN DIEGO < 20  8/18/14: Tg 0.1, JUAN DIEGO < 20, TSH 0.00  11/21/14: Tg 4 ng/ml (athyrotic < 0.1, intact < 33 [place] lab) JUAN DIEGO < 1.8,TSH 0.01  12/30/14: TSH 0.01  3/4/15; Tg 0.19, JUAN DIEGO < 0.4, TSH  <0.02 ; concurrent remeasure of 1/6/10 Tg 0.15  9/2/15: Tg 0.11, JUAN DIEGO < 0.4, TSH 0.01  5/26/16: Tg 0.27, JUAN DIEGO < 0.4, TSH 0.55  12/13/16: Tg 0.14, JUAN DIEGO < 0.4, TSH 0.07, free T4 1.32  6/13/17: Tg 0.17, JUAN DIEGO < 0.4, TSH 0.11-   9/24/18: Tg 0.18, JUAN DIEGO < 0.4, TSH 0.14 , free T 4 1.22,   10/10/18 24 hour urine calcium 80 mg/24 hours  11/30/18 SH 7.49 - seen by me today for lst time.   2/4/2020: Tg < 0.1, JUAN DIEGO < 0.4, TSH 0.38, free T4 1.52, Ca 8.4, phos 3.6, creatinine 1.09, K 3.5-       past imaging on PACs.    3/4/09 neck US: left level 7 lymph node, ? left level 4  5/27/09 PET/CT negative to my eye    Outside imaging reports:  10/26/12: 4 mCi 131I TBS negative  11/6/13: 4 mCi 131I TBS: negative  12/23/14: PET CT without contrast: focal left ovarian avidity of uncertain significance (also seen on outside PET 2010)-- t  6/7/16 US neck negative  6/7/16 CT chest: scattered subcm nodules  3/13/19 chest CT:       ROS   Further weight gain  Not feeling well x days  Cardiac:  negative  Respiratory: negative; MENJIVAR with exercise.   GI: diarrhea; stomach; abdominal pain; no nausea or vomiting  No periods x ? A year.   Hot flashes and night sweats    PMH   Past Medical History:   Diagnosis Date     Abnormal Papanicolaou smear of cervix and cervical HPV 12/03    ASCUS - repeat paps all ok.      Depressive disorder, not elsewhere classified      Dysmenorrhea      Fracture of arm age 14    right     Gestational diabetes      Papillary thyroid carcinoma (H) 10/05    5 cm, MF, + ETE, node+; 3 operations, 131I     Postsurgical hypoparathyroidism (H) 2005     Postsurgical hypothyroidism 2005     Unspecified contraceptive management      Past Surgical History:   Procedure Laterality Date     BIOPSY/REMOVAL, LYMPH NODE(S)  10/27/2009    Surgical resection of lymph nodes, neck.  Scar revision. [facility].      BIOPSY/EXCISION LYMPH NODE OPEN SUPERFICIAL  2007    removal of some more lymph nodes in the neck.       REMOVE TONSILS/ADENOIDS,14+ Y/O  1984      THYROIDECTOMY  10/07/2005    Total thyroidectomy.  [facility]. Further lymph node resection in 12/06       Current Outpatient Medications   Medication Sig Dispense Refill     calcitRIOL (ROCALTROL) 0.5 MCG capsule Take 1 capsule (0.5 mcg) by mouth daily 90 capsule 3     diclofenac (VOLTAREN) 75 MG EC tablet Take 1 tablet (75 mg) by mouth 2 times daily 60 tablet 1     docusate sodium 100 MG tablet Take 100 mg by mouth 2 times daily as needed for constipation 60 tablet 11     EPINEPHrine (EPIPEN) 0.3 MG/0.3ML injection Inject 0.3 mg into the muscle once as needed for anaphylaxis       magnesium oxide (MAG-OX) 400 MG tablet Take 0.5 tablets (200 mg) by mouth 2 times daily 90 tablet 3     SYNTHROID 125 MCG tablet Take 1 tablet (125 mcg) by mouth daily Separate iron or calcium-containing products and levothyroxine by at least 4 hours. 90 tablet 3       Family History   Problem Relation Age of Onset     Lipids Mother      Osteoporosis Mother      Cancer  Father         skin     Dementia Father      Cancer Paternal Grandfather         prostate cancer     Alzheimer Disease Paternal Grandmother      Heart Disease Paternal Grandmother      Cerebrovascular Disease Paternal Grandmother      Thyroid Disease Paternal Grandmother      Diabetes Maternal Grandmother      Thyroid Disease Maternal Grandmother         thyroid disease     Allergies Son         pcn     Cancer Maternal Aunt         breast - mid-30s       Social History     Socioeconomic History     Marital status:      Spouse name: Mushtaq     Number of children: 2     Years of education: 12+     Highest education level: Not on file   Occupational History     Occupation: Pharm. Tech     Employer: [employer]   Social Needs     Financial resource strain: Not on file     Food insecurity:     Worry: Not on file     Inability: Not on file     Transportation needs:     Medical: Not on file     Non-medical: Not on file   Tobacco Use     Smoking status: Former Smoker     Packs/day: 0.50     Years: 15.00     Pack years: 7.50     Last attempt to quit: 3/1/1998     Years since quittin.9     Smokeless tobacco: Never Used     Tobacco comment: no smoking in the home   Substance and Sexual Activity     Alcohol use: Yes     Comment: social     Drug use: No     Sexual activity: Yes     Partners: Male     Birth control/protection: None   Lifestyle     Physical activity:     Days per week: Not on file     Minutes per session: Not on file     Stress: Not on file   Relationships     Social connections:     Talks on phone: Not on file     Gets together: Not on file     Attends Restoration service: Not on file     Active member of club or organization: Not on file     Attends meetings of clubs or organizations: Not on file     Relationship status: Not on file     Intimate partner violence:     Fear of current or ex partner: Not on file     Emotionally abused: Not on file     Physically abused: Not on file     Forced  sexual activity: Not on file   Other Topics Concern      Service No     Blood Transfusions No     Caffeine Concern No     Comment: occasional     Occupational Exposure No     Hobby Hazards No     Sleep Concern No     Stress Concern No     Weight Concern No     Special Diet No     Back Care No     Exercise Yes     Bike Helmet No     Seat Belt Yes     Self-Exams Yes     Parent/sibling w/ CABG, MI or angioplasty before 65F 55M? Not Asked   Social History Narrative    Lives with son.       Physical Exam young woman in NAD.  GENERAL: pleasant woman in NAD  /70   Pulse 98   Wt 89.8 kg (197 lb 14.4 oz)   BMI 31.94 kg/m     SKIN: normal color, temperature, texture   HEENT: PER, no scleral icterus, eyelid retraction, stare, lid lag,     NECK: healed surgical scar.  No palpable masses  LUNGS: clear to auscultation bilaterally.   CARDIAC: RRR, S1, S2 without murmurs, rubs or gallops.    BACK: normal spinal contour.    NEURO: Alert, responds appropriately to questions,  moves all extremities, gait normal, no tremor of the outstretched hand    Results for MELONY LOPEZ (MRN [id number]) as of 2/4/2020 14:17   Ref. Range 2/4/2020 13:38   Potassium Latest Ref Range: 3.4 - 5.3 mmol/L 3.5   Creatinine Latest Ref Range: 0.52 - 1.04 mg/dL 1.09 (H)   GFR Estimate Latest Ref Range: >60 mL/min/1.73_m2 58 (L)   GFR Estimate If Black Latest Ref Range: >60 mL/min/1.73_m2 67   Calcium Latest Ref Range: 8.5 - 10.1 mg/dL 8.4 (L)   Phosphorus Latest Ref Range: 2.5 - 4.5 mg/dL 3.6   T4 Free Latest Ref Range: 0.76 - 1.46 ng/dL 1.52 (H)   TSH Latest Ref Range: 0.40 - 4.00 mU/L 0.38 (L)       ENDO THYROID LABS-[facility] Latest Ref Rng & Units 11/30/2018 9/24/2018   TSH 0.40 - 4.00 mU/L 7.49 (H) 0.14 (L)   T4 FREE 0.76 - 1.46 ng/dL 0.85 1.22     ENDO CALCIUM LABS-[facility] Latest Ref Rng & Units 11/29/2018   CALCIUM IONIZED 4.4 - 5.2 mg/dL    CALCIUM IONIZED WHOLE BLOOD 4.4 - 5.2 mg/dL    PHOSPHOROUS 2.5 - 4.5 mg/dL    MAGNESIUM 1.6 - 2.3  mg/dL    ALBUMIN 3.4 - 5.0 g/dL    BUN 7 - 30 mg/dL 17   CREATININE 0.52 - 1.04 mg/dL 0.98   PARATHYROID HORMONE INTACT 12 - 72 pg/mL    ALKPHOS 40 - 150 U/L    VITAMIN D 1, 25     25 OH VIT D2 ug/L    25 OH VIT D3 ug/L    25 OH VIT D TOTAL 20 - 75 ug/L    VITAMIN D DEFICIENCY SCREENING 20 - 75 ug/L    PROTEIN, TOTAL 6.8 - 8.8 g/dL    CALCIUM URINE G/24 H 0.10 - 0.30 g/24 h    CALCIUM URINE G/G CR g/g Cr    CALCIUM URINE MG/DL mg/dL    LIPASE 73 - 393 U/L      ENDO CALCIUM LABS-UMP Latest Ref Rng & Units 11/29/2018   CALCIUM IONIZED 4.4 - 5.2 mg/dL    CALCIUM IONIZED WHOLE BLOOD 4.4 - 5.2 mg/dL 4.3 (L)   PHOSPHOROUS 2.5 - 4.5 mg/dL 3.1   MAGNESIUM 1.6 - 2.3 mg/dL 2.1   ALBUMIN 3.4 - 5.0 g/dL 3.8   BUN 7 - 30 mg/dL 19   CREATININE 0.52 - 1.04 mg/dL 1.02   PARATHYROID HORMONE INTACT 12 - 72 pg/mL    ALKPHOS 40 - 150 U/L 71   VITAMIN D 1, 25     25 OH VIT D2 ug/L    25 OH VIT D3 ug/L    25 OH VIT D TOTAL 20 - 75 ug/L    VITAMIN D DEFICIENCY SCREENING 20 - 75 ug/L    PROTEIN, TOTAL 6.8 - 8.8 g/dL 7.9   CALCIUM URINE G/24 H 0.10 - 0.30 g/24 h    CALCIUM URINE G/G CR g/g Cr    CALCIUM URINE MG/DL mg/dL    LIPASE 73 - 393 U/L 189     ENDO CALCIUM LABS-UMP Latest Ref Rng & Units 10/10/2018   CALCIUM IONIZED 4.4 - 5.2 mg/dL    CALCIUM IONIZED WHOLE BLOOD 4.4 - 5.2 mg/dL    PHOSPHOROUS 2.5 - 4.5 mg/dL    MAGNESIUM 1.6 - 2.3 mg/dL    ALBUMIN 3.4 - 5.0 g/dL    BUN 7 - 30 mg/dL    CREATININE 0.52 - 1.04 mg/dL    PARATHYROID HORMONE INTACT 12 - 72 pg/mL    ALKPHOS 40 - 150 U/L    VITAMIN D 1, 25     25 OH VIT D2 ug/L    25 OH VIT D3 ug/L    25 OH VIT D TOTAL 20 - 75 ug/L    VITAMIN D DEFICIENCY SCREENING 20 - 75 ug/L    PROTEIN, TOTAL 6.8 - 8.8 g/dL    CALCIUM URINE G/24 H 0.10 - 0.30 g/24 h 0.08 (L)   CALCIUM URINE G/G CR g/g Cr 0.11   CALCIUM URINE MG/DL mg/dL 6.8   LIPASE 73 - 393 U/L      ENDO CALCIUM LABS-[facility] Latest Ref Rng & Units 9/24/2018   CALCIUM IONIZED 4.4 - 5.2 mg/dL    CALCIUM IONIZED WHOLE BLOOD 4.4 - 5.2 mg/dL     PHOSPHOROUS 2.5 - 4.5 mg/dL 3.0   MAGNESIUM 1.6 - 2.3 mg/dL    ALBUMIN 3.4 - 5.0 g/dL    BUN 7 - 30 mg/dL    CREATININE 0.52 - 1.04 mg/dL 1.01   PARATHYROID HORMONE INTACT 12 - 72 pg/mL    ALKPHOS 40 - 150 U/L    VITAMIN D 1, 25     25 OH VIT D2 ug/L <5   25 OH VIT D3 ug/L 35   25 OH VIT D TOTAL 20 - 75 ug/L <40   VITAMIN D DEFICIENCY SCREENING 20 - 75 ug/L    PROTEIN, TOTAL 6.8 - 8.8 g/dL    CALCIUM URINE G/24 H 0.10 - 0.30 g/24 h    CALCIUM URINE G/G CR g/g Cr    CALCIUM URINE MG/DL mg/dL    LIPASE 73 - 393 U/L        CT CHEST WITHOUT CONTRAST  3/13/2019 10:21 AM     HISTORY:  Chest wall pain. Shortness of breath. Chest pain on  breathing. Abnormal CT of the chest. Groundglass opacity present on  imaging of lung. History of thyroid cancer, thyroidectomy, lymph node  excision, and pneumonia.     TECHNIQUE: Scans obtained from the apices through the diaphragm  without IV contrast. Radiation dose for this scan was reduced using  automated exposure  control, adjustment of the mA and/or kV according to patient size, or  iterative reconstruction technique.     COMPARISON: CT chest dated 12/11/2018 and 6/7/2016.     FINDINGS: Groundglass density in the anterolateral left lower lobe  (image 198 series 6) is unchanged since the prior CT dated 12/11/2018,  but is new since the prior CT dated 6/7/2016. This could represent  scarring or, less likely, infiltrate. This could be followed with CT  in approximately six months to ensure stability or resolution.     0.3 cm diameter nodular density along the lateral aspect left major  fissure (image 103 series 6). It is stable since the prior CT dated  6/7/2016 and is benign. No other significant nodularity is identified.  Lungs are otherwise clear. No pneumothorax or significant pleural  fluid collection.     Heart is normal in size. No mediastinal, hilar, or axillary  lymphadenopathy is identified. No significant atherosclerotic changes  are seen in the thoracic aorta. Visualized  portions of the thyroid are  unremarkable.     A few scattered diverticuli are seen in the visualized portions of the  colon. There are mild nonaneurysmal atherosclerotic calcifications of  the aorta. Visualized portions of the upper abdominal contents are  otherwise unremarkable.     No aggressive osseous lesions or fractures are identified.                                                                      IMPRESSION:  1. Stable groundglass density in the left lower lung lobe likely  represents scarring or atelectasis. Inflammatory and neoplastic  processes are considered less likely. Recommend follow-up CT in  approximately six months to ensure stability.  2. No other significant changes.     HENRY MCRAE MD    Again, thank you for allowing me to participate in the care of your patient.      Sincerely,    Vero Arevalo MD

## 2020-02-04 NOTE — PATIENT INSTRUCTIONS
Labs today and every 6 months (standing orders)    See me approximately yearly or sooner if concerns    Get neck ultrasound before 2021 appointment.

## 2020-02-11 LAB — LAB SCANNED RESULT: NORMAL

## 2020-04-08 NOTE — PROGRESS NOTES
"Outpatient Physical Therapy Discharge Note     Patient: Radha Mcdermott  : 1965    Beginning/End Dates of Reporting Period:  19 to 19    Referring Provider: WANDA Payne CNP     Therapy Diagnosis: Decreased L ankle strength, L talocrural joint mobility & ROM restrictions     Client Self Report: The bottom of the foot (by heel) is bad this week.   I have done more shopping and such outside of work.      Objective Measurements:  Not present at discharge    Goals:  Goal Identifier HEP   Goal Description Patient will report compliance with HEP at least 3 days per week in order to demonstrate improved self-management of pain.   Target Date 19   Date Met      Progress:     Goal Identifier Balance   Goal Description Patient will balance with L SLS x 30\" and no UE support, pain 1/10 at highest, in order to demonstrate improved balance for safe mobility and functional activities.   Target Date 19   Date Met      Progress:     Goal Identifier LEFS   Goal Description Patient will improve LEFS score by at least 20 points in order to demonstrate functional improvements.   Target Date 19   Date Met      Progress:     Goal Identifier Ambulation   Goal Description Patient will demonstrate ambulation on TM x10' at 2.5 mph without pain in order to demonstrate improved tolerance to prolonged ambulation.   Target Date 19   Date Met      Progress:       Progress Toward Goals:   Progress this reporting period: Radha was making progress during session.  Then, she scheduled no further sessions with no contact.          Plan:  Discharge from therapy.    Discharge:    Reason for Discharge: Patient has failed to schedule further appointments.    Equipment Issued: none    Discharge Plan: Patient to continue home program.  "

## 2020-09-10 ENCOUNTER — ANCILLARY PROCEDURE (OUTPATIENT)
Dept: GENERAL RADIOLOGY | Facility: CLINIC | Age: 55
End: 2020-09-10
Attending: PODIATRIST
Payer: COMMERCIAL

## 2020-09-10 ENCOUNTER — OFFICE VISIT (OUTPATIENT)
Dept: PODIATRY | Facility: CLINIC | Age: 55
End: 2020-09-10
Payer: COMMERCIAL

## 2020-09-10 VITALS
DIASTOLIC BLOOD PRESSURE: 82 MMHG | SYSTOLIC BLOOD PRESSURE: 130 MMHG | BODY MASS INDEX: 32.14 KG/M2 | WEIGHT: 200 LBS | HEIGHT: 66 IN

## 2020-09-10 DIAGNOSIS — M72.2 PLANTAR FASCIITIS, RIGHT: ICD-10-CM

## 2020-09-10 DIAGNOSIS — M72.2 PLANTAR FASCIITIS, RIGHT: Primary | ICD-10-CM

## 2020-09-10 DIAGNOSIS — M76.72 PERONEAL TENDINITIS, LEFT: ICD-10-CM

## 2020-09-10 PROCEDURE — 73630 X-RAY EXAM OF FOOT: CPT | Mod: TC

## 2020-09-10 PROCEDURE — 99213 OFFICE O/P EST LOW 20 MIN: CPT | Performed by: PODIATRIST

## 2020-09-10 RX ORDER — DICLOFENAC SODIUM 75 MG/1
75 TABLET, DELAYED RELEASE ORAL 2 TIMES DAILY
Qty: 60 TABLET | Refills: 1 | Status: SHIPPED | OUTPATIENT
Start: 2020-09-10 | End: 2023-06-09

## 2020-09-10 ASSESSMENT — MIFFLIN-ST. JEOR: SCORE: 1528.94

## 2020-09-10 ASSESSMENT — PAIN SCALES - GENERAL: PAINLEVEL: SEVERE PAIN (7)

## 2020-09-10 NOTE — LETTER
"    9/10/2020         RE: Radha Mcdermott  [address] [facility] [address]        Dear Colleague,    Thank you for referring your patient, Radha Mcdermott, to the [facility]. Please see a copy of my visit note below.    HPI:  Radha Mcdermott is a 55 year old female who is seen in consultation at the request of self.    Pt presents for eval of:   (Onset, Location, L/R, Character, Treatments, Injury if yes)    XR Left and Right foot today 9/10/2020     Ongoing early Aug 2020, lump lateral Left foot, rolled foot and felt a \"crack\", plantar Right heel pain that radiates posterior Right achilles. Presents today with supportive athletic shoes, orthotics. LOV 10/31/2019 for left plantar fasciitis that is improving with orthotics, shoes, night splint  Constant, sharp pain 7 in Right foot. Throbbing pain left and right foot pain 7    Works as a [employer].    Weight management plan: Patient was referred to their PCP to discuss a diet and exercise plan.       ROS:  10 point ROS neg other than the symptoms noted above in the HPI.    Patient Active Problem List   Diagnosis     Dysmenorrhea     Contraceptive management     Hypocalcemia     Post-surgical hypoparathyroidism (H)     Disorder of magnesium metabolism     Papillary thyroid carcinoma (H)     Anxiety state     Postsurgical hypothyroidism     Iron deficiency anemia     CARDIOVASCULAR SCREENING; LDL GOAL LESS THAN 160     Health Care Home     Pulmonary nodules     Abdominal mass     Shoulder joint pain     Major depressive disorder, recurrent episode, mild (H)     Cystocele, midline     Class 1 obesity with serious comorbidity and body mass index (BMI) of 32.0 to 32.9 in adult, unspecified obesity type     Chest pain     Unstable angina (H)     S/P coronary angiogram     Abnormal CT of the chest     Ground glass opacity present on imaging of lung       PAST MEDICAL HISTORY:   Past Medical History:   Diagnosis Date "     Abnormal Papanicolaou smear of cervix and cervical HPV 12/03    ASCUS - repeat paps all ok.      Depressive disorder, not elsewhere classified      Dysmenorrhea      Fracture of arm age 14    right     Gestational diabetes      Papillary thyroid carcinoma (H) 10/05    5 cm, MF, + ETE, node+; 3 operations, 131I     Postsurgical hypoparathyroidism (H) 2005     Postsurgical hypothyroidism 2005     Unspecified contraceptive management         PAST SURGICAL HISTORY:   Past Surgical History:   Procedure Laterality Date     BIOPSY/REMOVAL, LYMPH NODE(S)  10/27/2009    Surgical resection of lymph nodes, neck.  Scar revision. [facility].     HC BIOPSY/EXCISION LYMPH NODE OPEN SUPERFICIAL  2007    removal of some more lymph nodes in the neck.      HC REMOVE TONSILS/ADENOIDS,12+ Y/O  1984     HC THYROIDECTOMY  10/07/2005    Total thyroidectomy.  F[facility]. Further lymph node resection in 12/06        MEDICATIONS:   Current Outpatient Medications:      calcitRIOL (ROCALTROL) 0.5 MCG capsule, Take 1 capsule (0.5 mcg) by mouth daily, Disp: 90 capsule, Rfl: 3     diclofenac (VOLTAREN) 75 MG EC tablet, Take 1 tablet (75 mg) by mouth 2 times daily, Disp: 60 tablet, Rfl: 1     docusate sodium 100 MG tablet, Take 100 mg by mouth 2 times daily as needed for constipation, Disp: 60 tablet, Rfl: 11     magnesium oxide (MAG-OX) 400 MG tablet, Take 0.5 tablets (200 mg) by mouth 2 times daily, Disp: 90 tablet, Rfl: 3     SYNTHROID 125 MCG tablet, Take 1 tablet (125 mcg) by mouth daily Separate iron or calcium-containing products and levothyroxine by at least 4 hours., Disp: 90 tablet, Rfl: 3     EPINEPHrine (EPIPEN) 0.3 MG/0.3ML injection, Inject 0.3 mg into the muscle once as needed for anaphylaxis, Disp: , Rfl:      ALLERGIES:    Allergies   Allergen Reactions     Bees      Chicken Allergy Swelling     Meat Extract Swelling     No Known Drug Allergies         SOCIAL HISTORY:   Social History     Socioeconomic History     Marital status:       Spouse name: Mushtaq     Number of children: 2     Years of education: 12+     Highest education level: Not on file   Occupational History     Occupation: Pharm. Tech     Employer: [employer]   Social Needs     Financial resource strain: Not on file     Food insecurity     Worry: Not on file     Inability: Not on file     Transportation needs     Medical: Not on file     Non-medical: Not on file   Tobacco Use     Smoking status: Former Smoker     Packs/day: 0.50     Years: 15.00     Pack years: 7.50     Last attempt to quit: 3/1/1998     Years since quittin.5     Smokeless tobacco: Never Used     Tobacco comment: no smoking in the home   Substance and Sexual Activity     Alcohol use: Yes     Comment: social     Drug use: No     Sexual activity: Yes     Partners: Male     Birth control/protection: None   Lifestyle     Physical activity     Days per week: Not on file     Minutes per session: Not on file     Stress: Not on file   Relationships     Social connections     Talks on phone: Not on file     Gets together: Not on file     Attends Yarsani service: Not on file     Active member of club or organization: Not on file     Attends meetings of clubs or organizations: Not on file     Relationship status: Not on file     Intimate partner violence     Fear of current or ex partner: Not on file     Emotionally abused: Not on file     Physically abused: Not on file     Forced sexual activity: Not on file   Other Topics Concern      Service No     Blood Transfusions No     Caffeine Concern No     Comment: occasional     Occupational Exposure No     Hobby Hazards No     Sleep Concern No     Stress Concern No     Weight Concern No     Special Diet No     Back Care No     Exercise Yes     Bike Helmet No     Seat Belt Yes     Self-Exams Yes     Parent/sibling w/ CABG, MI or angioplasty before 65F 55M? Not Asked   Social History Narrative    Lives with son.        FAMILY HISTORY:   Family  "History   Problem Relation Age of Onset     Lipids Mother      Osteoporosis Mother      Cancer Father         skin     Dementia Father      Cancer Paternal Grandfather         prostate cancer     Alzheimer Disease Paternal Grandmother      Heart Disease Paternal Grandmother      Cerebrovascular Disease Paternal Grandmother      Thyroid Disease Paternal Grandmother      Diabetes Maternal Grandmother      Thyroid Disease Maternal Grandmother         thyroid disease     Allergies Son         pcn     Cancer Maternal Aunt         breast - mid-30s        EXAM:Vitals: /82 (BP Location: Left arm, Patient Position: Sitting, Cuff Size: Adult Regular)   Ht 1.676 m (5' 6\")   Wt 90.7 kg (200 lb)   BMI 32.28 kg/m    BMI= Body mass index is 32.28 kg/m .    General appearance: Patient is alert and fully cooperative with history & exam.  No sign of distress is noted during the visit.     Psychiatric: Affect is pleasant & appropriate.  Patient appears motivated to improve health.     Respiratory: Breathing is regular & unlabored while sitting.     HEENT: Hearing is intact to spoken word.  Speech is clear.  No gross evidence of visual impairment that would impact ambulation.     Vascular: DP & PT pulses are intact & regular bilaterally.  No significant edema or varicosities noted.  CFT and skin temperature is normal to both lower extremities.     Neurologic: Lower extremity sensation is intact to light touch.  No evidence of weakness or contracture in the lower extremities.  No evidence of neuropathy.    Dermatologic: Skin is intact to both lower extremities with adequate texture, turgor and tone about the integument.  No paronychia or evidence of soft tissue infection is noted.     Musculoskeletal: Patient is ambulatory without assistive device or brace.  Generalized valgus noted bilateral.  Pinpoint area of discomfort at the fifth metatarsal base not proximal to this but rather at the base itself.  Manual muscle strength " +5/5 to all 4 quadrants.  No palpable edema erythema or heat.  Right foot most discomfort is noted along the medial band of the plantar fascia.  Pain mildly out of proportion.  No erythema or heat.  No pain with compression of the calcaneus medial to lateral.  No pain on the right foot with palpation of the Achilles peroneal or posterior tibial tendon.  Patient is obese    Radiographs: 3 views bilateral demonstrate no acute cortical reaction.  No joint diastases.  Mild pes valgus noted bilateral.       ASSESSMENT:       ICD-10-CM    1. Plantar fasciitis, right  M72.2 XR Foot Bilateral G/E 3 Views     PHYSICAL THERAPY REFERRAL   2. Peroneal tendinitis, left  M76.72         PLAN:  Reviewed patient[facility] in [facility].      9/10/2020   Obtained and interpreted radiographs today  She has mild peroneal insertional tendinitis on the left foot.  Also plantar fasciitis symptoms along the medial band of the plantar fascia through the central arch on the right foot.  No palpable rupture or visible or palpable edema.  Recommended very strict utilization of custom molded orthotics no barefoot walking.  Patient does demonstrate flip-flop 10 so I suspect much of this comes from not supporting her feet adequately.  Recommended minimizing changes in shoe gear.  Begin oral anti-inflammatory Voltaren Rx prescribed today  Begin physical therapy as this was helpful in the past.    Juanpablo Baig DPM      Again, thank you for allowing me to participate in the care of your patient.        Sincerely,        Juanpablo Baig DPM

## 2020-09-10 NOTE — PROGRESS NOTES
"HPI:  Radha Mcdermott is a 55 year old female who is seen in consultation at the request of self.    Pt presents for eval of:   (Onset, Location, L/R, Character, Treatments, Injury if yes)    XR Left and Right foot today 9/10/2020     Ongoing early Aug 2020, lump lateral Left foot, rolled foot and felt a \"crack\", plantar Right heel pain that radiates posterior Right achilles. Presents today with supportive athletic shoes, orthotics. LOV 10/31/2019 for left plantar fasciitis that is improving with orthotics, shoes, night splint  Constant, sharp pain 7 in Right foot. Throbbing pain left and right foot pain 7    Works as a [employer].    Weight management plan: Patient was referred to their PCP to discuss a diet and exercise plan.       ROS:  10 point ROS neg other than the symptoms noted above in the HPI.    Patient Active Problem List   Diagnosis     Dysmenorrhea     Contraceptive management     Hypocalcemia     Post-surgical hypoparathyroidism (H)     Disorder of magnesium metabolism     Papillary thyroid carcinoma (H)     Anxiety state     Postsurgical hypothyroidism     Iron deficiency anemia     CARDIOVASCULAR SCREENING; LDL GOAL LESS THAN 160     Health Care Home     Pulmonary nodules     Abdominal mass     Shoulder joint pain     Major depressive disorder, recurrent episode, mild (H)     Cystocele, midline     Class 1 obesity with serious comorbidity and body mass index (BMI) of 32.0 to 32.9 in adult, unspecified obesity type     Chest pain     Unstable angina (H)     S/P coronary angiogram     Abnormal CT of the chest     Ground glass opacity present on imaging of lung       PAST MEDICAL HISTORY:   Past Medical History:   Diagnosis Date     Abnormal Papanicolaou smear of cervix and cervical HPV 12/03    ASCUS - repeat paps all ok.      Depressive disorder, not elsewhere classified      Dysmenorrhea      Fracture of arm age 14    right     Gestational diabetes      Papillary " thyroid carcinoma (H) 10/05    5 cm, MF, + ETE, node+; 3 operations, 131I     Postsurgical hypoparathyroidism (H) 2005     Postsurgical hypothyroidism 2005     Unspecified contraceptive management         PAST SURGICAL HISTORY:   Past Surgical History:   Procedure Laterality Date     BIOPSY/REMOVAL, LYMPH NODE(S)  10/27/2009    Surgical resection of lymph nodes, neck.  Scar revision. [facility].     HC BIOPSY/EXCISION LYMPH NODE OPEN SUPERFICIAL  2007    removal of some more lymph nodes in the neck.      HC REMOVE TONSILS/ADENOIDS,12+ Y/O  1984     HC THYROIDECTOMY  10/07/2005    Total thyroidectomy.  F-[facility]. Further lymph node resection in 12/06        MEDICATIONS:   Current Outpatient Medications:      calcitRIOL (ROCALTROL) 0.5 MCG capsule, Take 1 capsule (0.5 mcg) by mouth daily, Disp: 90 capsule, Rfl: 3     diclofenac (VOLTAREN) 75 MG EC tablet, Take 1 tablet (75 mg) by mouth 2 times daily, Disp: 60 tablet, Rfl: 1     docusate sodium 100 MG tablet, Take 100 mg by mouth 2 times daily as needed for constipation, Disp: 60 tablet, Rfl: 11     magnesium oxide (MAG-OX) 400 MG tablet, Take 0.5 tablets (200 mg) by mouth 2 times daily, Disp: 90 tablet, Rfl: 3     SYNTHROID 125 MCG tablet, Take 1 tablet (125 mcg) by mouth daily Separate iron or calcium-containing products and levothyroxine by at least 4 hours., Disp: 90 tablet, Rfl: 3     EPINEPHrine (EPIPEN) 0.3 MG/0.3ML injection, Inject 0.3 mg into the muscle once as needed for anaphylaxis, Disp: , Rfl:      ALLERGIES:    Allergies   Allergen Reactions     Bees      Chicken Allergy Swelling     Meat Extract Swelling     No Known Drug Allergies         SOCIAL HISTORY:   Social History     Socioeconomic History     Marital status:      Spouse name: Mushtaq     Number of children: 2     Years of education: 12+     Highest education level: Not on file   Occupational History     Occupation: Pharm. Tech     Employer: [employer]   Social Needs     Financial  resource strain: Not on file     Food insecurity     Worry: Not on file     Inability: Not on file     Transportation needs     Medical: Not on file     Non-medical: Not on file   Tobacco Use     Smoking status: Former Smoker     Packs/day: 0.50     Years: 15.00     Pack years: 7.50     Last attempt to quit: 3/1/1998     Years since quittin.5     Smokeless tobacco: Never Used     Tobacco comment: no smoking in the home   Substance and Sexual Activity     Alcohol use: Yes     Comment: social     Drug use: No     Sexual activity: Yes     Partners: Male     Birth control/protection: None   Lifestyle     Physical activity     Days per week: Not on file     Minutes per session: Not on file     Stress: Not on file   Relationships     Social connections     Talks on phone: Not on file     Gets together: Not on file     Attends Sikh service: Not on file     Active member of club or organization: Not on file     Attends meetings of clubs or organizations: Not on file     Relationship status: Not on file     Intimate partner violence     Fear of current or ex partner: Not on file     Emotionally abused: Not on file     Physically abused: Not on file     Forced sexual activity: Not on file   Other Topics Concern      Service No     Blood Transfusions No     Caffeine Concern No     Comment: occasional     Occupational Exposure No     Hobby Hazards No     Sleep Concern No     Stress Concern No     Weight Concern No     Special Diet No     Back Care No     Exercise Yes     Bike Helmet No     Seat Belt Yes     Self-Exams Yes     Parent/sibling w/ CABG, MI or angioplasty before 65F 55M? Not Asked   Social History Narrative    Lives with son.        FAMILY HISTORY:   Family History   Problem Relation Age of Onset     Lipids Mother      Osteoporosis Mother      Cancer Father         skin     Dementia Father      Cancer Paternal Grandfather         prostate cancer     Alzheimer Disease Paternal Grandmother      Heart  "Disease Paternal Grandmother      Cerebrovascular Disease Paternal Grandmother      Thyroid Disease Paternal Grandmother      Diabetes Maternal Grandmother      Thyroid Disease Maternal Grandmother         thyroid disease     Allergies Son         pcn     Cancer Maternal Aunt         breast - mid-30s        EXAM:Vitals: /82 (BP Location: Left arm, Patient Position: Sitting, Cuff Size: Adult Regular)   Ht 1.676 m (5' 6\")   Wt 90.7 kg (200 lb)   BMI 32.28 kg/m    BMI= Body mass index is 32.28 kg/m .    General appearance: Patient is alert and fully cooperative with history & exam.  No sign of distress is noted during the visit.     Psychiatric: Affect is pleasant & appropriate.  Patient appears motivated to improve health.     Respiratory: Breathing is regular & unlabored while sitting.     HEENT: Hearing is intact to spoken word.  Speech is clear.  No gross evidence of visual impairment that would impact ambulation.     Vascular: DP & PT pulses are intact & regular bilaterally.  No significant edema or varicosities noted.  CFT and skin temperature is normal to both lower extremities.     Neurologic: Lower extremity sensation is intact to light touch.  No evidence of weakness or contracture in the lower extremities.  No evidence of neuropathy.    Dermatologic: Skin is intact to both lower extremities with adequate texture, turgor and tone about the integument.  No paronychia or evidence of soft tissue infection is noted.     Musculoskeletal: Patient is ambulatory without assistive device or brace.  Generalized valgus noted bilateral.  Pinpoint area of discomfort at the fifth metatarsal base not proximal to this but rather at the base itself.  Manual muscle strength +5/5 to all 4 quadrants.  No palpable edema erythema or heat.  Right foot most discomfort is noted along the medial band of the plantar fascia.  Pain mildly out of proportion.  No erythema or heat.  No pain with compression of the calcaneus medial " to lateral.  No pain on the right foot with palpation of the Achilles peroneal or posterior tibial tendon.  Patient is obese    Radiographs: 3 views bilateral demonstrate no acute cortical reaction.  No joint diastases.  Mild pes valgus noted bilateral.       ASSESSMENT:       ICD-10-CM    1. Plantar fasciitis, right  M72.2 XR Foot Bilateral G/E 3 Views     PHYSICAL THERAPY REFERRAL   2. Peroneal tendinitis, left  M76.72         PLAN:  Reviewed patient's chart in [facility].      9/10/2020   Obtained and interpreted radiographs today  She has mild peroneal insertional tendinitis on the left foot.  Also plantar fasciitis symptoms along the medial band of the plantar fascia through the central arch on the right foot.  No palpable rupture or visible or palpable edema.  Recommended very strict utilization of custom molded orthotics no barefoot walking.  Patient does demonstrate flip-flop 10 so I suspect much of this comes from not supporting her feet adequately.  Recommended minimizing changes in shoe gear.  Begin oral anti-inflammatory Voltaren Rx prescribed today  Begin physical therapy as this was helpful in the past.    Juanpablo Baig DPM

## 2020-09-25 ENCOUNTER — HOSPITAL ENCOUNTER (OUTPATIENT)
Dept: PHYSICAL THERAPY | Facility: CLINIC | Age: 55
Setting detail: THERAPIES SERIES
End: 2020-09-25
Attending: PODIATRIST
Payer: COMMERCIAL

## 2020-09-25 PROCEDURE — 97110 THERAPEUTIC EXERCISES: CPT | Mod: GP | Performed by: PHYSICAL THERAPIST

## 2020-09-25 PROCEDURE — 97161 PT EVAL LOW COMPLEX 20 MIN: CPT | Mod: GP | Performed by: PHYSICAL THERAPIST

## 2020-09-25 PROCEDURE — 97140 MANUAL THERAPY 1/> REGIONS: CPT | Mod: GP | Performed by: PHYSICAL THERAPIST

## 2020-09-25 NOTE — PROGRESS NOTES
09/25/20 1500   General Information   Type of Visit Initial OP Ortho PT Evaluation   Start of Care Date 09/25/20   Referring Physician Dr. Baig   Patient/Family Goals Statement Reduce pain.   Orders Evaluate and Treat   Date of Order 09/10/20   Certification Required? No   Medical Diagnosis R plantar fasciitis   Surgical/Medical history reviewed Yes   Precautions/Limitations no known precautions/limitations   Body Part(s)   Body Part(s) Ankle/Foot   Presentation and Etiology   Pertinent history of current problem (include personal factors and/or comorbidities that impact the POC) Pt notes that R foot started hurting about 2 months ago with insidious onset.  Pt notes it has since caused pain in her L foot to reservice.     Impairments A. Pain;D. Decreased ROM;E. Decreased flexibility;F. Decreased strength and endurance;G. Impaired balance;H. Impaired gait   Functional Limitations perform activities of daily living;perform required work activities   Symptom Location platnar surface of foot near heel, slightly more on medial aspect.     How/Where did it occur From insidious onset   Onset date of current episode/exacerbation 07/25/20   Chronicity New   Pain rating (0-10 point scale) Best (/10);Worst (/10)   Best (/10) 2/10   Worst (/10) 8/10   Pain quality A. Sharp;G. Cramping   Frequency of pain/symptoms B. Intermittent   Pain/symptoms are: Worse during the day   Pain/symptoms exacerbated by A. Sitting;B. Walking;G. Certain positions   Pain/symptoms eased by A. Sitting;B. Walking   Progression of symptoms since onset: Unchanged   Current Level of Function   Current Community Support Family/friend caregiver   Patient role/employment history A. Employed   Employment Comments Pharmacy tech.   Living environment House[facility]   Home/community accessibility j[place]iar, 3 steps into house.   Current equipment-Gait/Locomotion None   Current equipment-ADL None   Fall Risk Screen   Fall screen completed by PT   Have you  fallen 2 or more times in the past year? No   Have you fallen and had an injury in the past year? No   Is patient a fall risk? No   Abuse Screen (yes response referral indicated)   Feels Unsafe at Home or Work/School no   Feels Threatened by Someone no   Does Anyone Try to Keep You From Having Contact with Others or Doing Things Outside Your Home? no   Physical Signs of Abuse Present no   Ankle/Foot Objective Findings   Side (if bilateral, select both right and left) Right   Right DF (Knee Ext) AROM 8   Right PF AROM 62   Right Calcanceal Inversion AROM 40   Right Calcaneal Eversion AROM 30   Left DF (Knee Ext) AROM 8   Left PF AROM 65   Left Calcanceal Inversion AROM 40   Left Calcaneal Eversion AROM 30   Right DF/Inversion Strength 4/5   Right DF/Eversion Strength 4/5   Right PF Strength 4/5   Left DF/Inversion Strength 5/5   Left DF/Eversion Strength 5/5   Left PF Strength 4/5   Right Gastroc (in WB) Flexibility tight   Windlass Test tight on R   Longitudinal Arch Angle Test Maintains good longitudinal arch with less than 1/2cm navicular drop   Anterior Drawer Test negative   Posterior Drawer Test negative   Palpation R plantar fascia and quadratus plantae.   Right PF/Inversion Strength 4/5   Right PF/Eversion Strength 5/5   Left PF/Inversion Strength 5/5   Left PF/Eversion Strength 5/5   Ankle/Foot Strength Comments     Right Soleus (in WB) Flexibility tight   Planned Therapy Interventions   Planned Therapy Interventions balance training;joint mobilization;manual therapy;neuromuscular re-education;ROM;strengthening;stretching   Planned Modality Interventions   Planned Modality Interventions Cryotherapy;TENS   Clinical Impression   Criteria for Skilled Therapeutic Interventions Met yes, treatment indicated   PT Diagnosis R plantar fasciitis.   Influenced by the following impairments Pain, decreased strength, impaired balance.   Functional limitations due to impairments Walking, prolonged standing.   Clinical  Presentation Stable/Uncomplicated   Clinical Presentation Rationale Clinical judgement.   Clinical Decision Making (Complexity) Low complexity   Therapy Frequency 2 times/Week   Predicted Duration of Therapy Intervention (days/wks) 6 weeks   Risk & Benefits of therapy have been explained Yes   Patient, Family & other staff in agreement with plan of care Yes   Clinical Impression Comments Pt presented to PT with symptoms of weakness in R LE and pain along plantar fascia consistent with diagnosis of plantar fasciitis.  Pt will benefit from skilled PT to address impairments to improve functional status.     Education Assessment   Preferred Learning Style Listening;Demonstration;Pictures/video   Ortho Goal 1   Goal Identifier Walking in the morning.   Goal Description Pt will report no more than 2/10 pain with initial steps in the morning in order to get ready for the day with less pain.   Target Date 11/08/20   Ortho Goal 2   Goal Identifier Strength   Goal Description MMT to 5/5 on all motions in order to demonstrate appropriate ankle stability for all functional tasks.    Target Date 11/08/20   Ortho Goal 3   Goal Identifier Walking   Goal Description Pt will tolerated no increase in pain with ambulation up to 1 mile in order to perform recreational walking to maintain a healthy lifestyle.   Target Date 11/08/20   Total Evaluation Time   PT Eval, Low Complexity Minutes (27017) 20

## 2020-09-25 NOTE — PROGRESS NOTES
09/25/20 1500   Signing Clinician's Name / Credentials   Signing clinician's name / credentials Tony Reaves, PT, DPT, OCS   Session Number   Session Number 1   Authorization status [employer] - No auth/limits   Ortho Goal 1   Goal Identifier Walking in the morning.   Goal Description Pt will report no more than 2/10 pain with initial steps in the morning in order to get ready for the day with less pain.   Target Date 11/08/20   Ortho Goal 2   Goal Identifier Strength   Goal Description MMT to 5/5 on all motions in order to demonstrate appropriate ankle stability for all functional tasks.    Target Date 11/08/20   Ortho Goal 3   Goal Identifier Walking   Goal Description Pt will tolerated no increase in pain with ambulation up to 1 mile in order to perform recreational walking to maintain a healthy lifestyle.   Target Date 11/08/20   Treatment Interventions   Interventions Therapeutic Procedure/Exercise;Manual Therapy   Therapeutic Procedure/exercise   Therapeutic Procedures: strength, endurance, ROM, flexibillity minutes (06241) 25   Skilled Intervention Education, exercise selection   Patient Response Tolerated well with no significant increase in pain.   Treatment Detail Stretch for gastroc, soleus, plantar fascia 30 seconds.  Ankle 4 way T band 20x each.  VC's and demo to perform correctly.    Manual Therapy   Manual Therapy: Mobilization, MFR, MLD, friction massage minutes (13247) 15   Skilled Intervention STM and TrP release.   Patient Response Tolerated well with improved tissue mobility   Treatment Detail STM and TrP release to R plantar fascia.   Education   Learner Patient   Readiness Acceptance   Method Explanation;Demonstration   Response Verbalizes Understanding;Demonstrates Understanding   Plan   Homework Maintain walking within tolerable ROM.   Home program Gastroc, soleus, and plantar fascia stretches for 30 seconds each.  4 way ankle strength with GTB.     Plan for next session F/u HEP.   Assess balance and add appropriate exercises.     Total Session Time   Timed Code Treatment Minutes 40   Total Treatment Time (sum of timed and untimed services) 60   AMBULATORY CLINICS ONLY-MEDICAL AND TREATMENT DIAGNOSIS   Medical Diagnosis R plantar fasciitis   PT Diagnosis R plantar fasciitis.

## 2020-09-30 ENCOUNTER — HOSPITAL ENCOUNTER (OUTPATIENT)
Dept: PHYSICAL THERAPY | Facility: CLINIC | Age: 55
Setting detail: THERAPIES SERIES
End: 2020-09-30
Attending: PODIATRIST
Payer: COMMERCIAL

## 2020-09-30 PROCEDURE — 97110 THERAPEUTIC EXERCISES: CPT | Mod: GP | Performed by: PHYSICAL THERAPIST

## 2020-09-30 PROCEDURE — 97140 MANUAL THERAPY 1/> REGIONS: CPT | Mod: GP | Performed by: PHYSICAL THERAPIST

## 2020-09-30 PROCEDURE — 97112 NEUROMUSCULAR REEDUCATION: CPT | Mod: GP | Performed by: PHYSICAL THERAPIST

## 2020-09-30 NOTE — PROGRESS NOTES
09/30/20 1500   Signing Clinician's Name / Credentials   Signing clinician's name / credentials Tony Reaves, PT, DPT, OCS   Session Number   Session Number 2   Authorization status [employer] - No auth/limits   Ortho Goal 1   Goal Identifier Walking in the morning.   Goal Description Pt will report no more than 2/10 pain with initial steps in the morning in order to get ready for the day with less pain.   Target Date 11/08/20   Ortho Goal 2   Goal Identifier Strength   Goal Description MMT to 5/5 on all motions in order to demonstrate appropriate ankle stability for all functional tasks.    Target Date 11/08/20   Ortho Goal 3   Goal Identifier Walking   Goal Description Pt will tolerated no increase in pain with ambulation up to 1 mile in order to perform recreational walking to maintain a healthy lifestyle.   Target Date 11/08/20   Subjective Report   Subjective Report Pt reports R foot has less pain on the weekends but working today her pain has elevated again up to 4-5/10.  Pt notes exercises are going well at home.   Treatment Interventions   Interventions Neuromuscular Re-education   Therapeutic Procedure/exercise   Therapeutic Procedures: strength, endurance, ROM, flexibillity minutes (56382) 10   Skilled Intervention Education, exercise selection   Patient Response Tolerated well.   Treatment Detail Stretch for gastroc, soleus, plantar fascia 30 seconds.  Ankle 4 way T band 20x each.  VC's and demo to perform correctly.    Neuromuscular Re-education   Neuromuscular re-ed of mvmt, balance, coord, kinesthetic sense, posture, proprioception minutes (49693) 10   Skilled Intervention Balance selection and progression   Patient Response Appropriate difficulty.   Treatment Detail NBOS 30 seconds, EO and EC, no difficulty.  Tandem stance 2x 30 sec. Repeated with vertical and horizontal head turns.     Manual Therapy   Manual Therapy: Mobilization, MFR, MLD, friction massage minutes (41707) 10   Skilled  Intervention STM and TrP release.   Patient Response Tolerated well with improved tissue mobility   Treatment Detail STM and TrP release to R plantar fascia.   Education   Learner Patient   Readiness Acceptance   Method Explanation;Demonstration   Response Verbalizes Understanding;Demonstrates Understanding   Plan   Homework Maintain walking as able.   Home program Added Tandem stance with head turns.  Gastroc, soleus, and plantar fascia stretches for 30 seconds each.  4 way ankle strength with GTB.     Plan for next session Progress as able, consider toe salutes.     Total Session Time   Timed Code Treatment Minutes 30   Total Treatment Time (sum of timed and untimed services) 30   AMBULATORY CLINICS ONLY-MEDICAL AND TREATMENT DIAGNOSIS   Medical Diagnosis R plantar fasciitis   PT Diagnosis R plantar fasciitis

## 2020-10-06 ENCOUNTER — HOSPITAL ENCOUNTER (OUTPATIENT)
Dept: PHYSICAL THERAPY | Facility: CLINIC | Age: 55
Setting detail: THERAPIES SERIES
End: 2020-10-06
Attending: PODIATRIST
Payer: COMMERCIAL

## 2020-10-06 PROCEDURE — 97112 NEUROMUSCULAR REEDUCATION: CPT | Mod: GP | Performed by: PHYSICAL THERAPIST

## 2020-10-06 PROCEDURE — 97110 THERAPEUTIC EXERCISES: CPT | Mod: GP | Performed by: PHYSICAL THERAPIST

## 2020-10-06 PROCEDURE — 97140 MANUAL THERAPY 1/> REGIONS: CPT | Mod: GP | Performed by: PHYSICAL THERAPIST

## 2020-10-06 NOTE — PROGRESS NOTES
10/06/20 1400   Signing Clinician's Name / Credentials   Signing clinician's name / credentials Tony Reaves, PT, DPT, OCS   Session Number   Session Number 3   Authorization status [employer] - No auth/limits   Ortho Goal 1   Goal Identifier Walking in the morning.   Goal Description Pt will report no more than 2/10 pain with initial steps in the morning in order to get ready for the day with less pain.   Target Date 11/08/20   Ortho Goal 2   Goal Identifier Strength   Goal Description MMT to 5/5 on all motions in order to demonstrate appropriate ankle stability for all functional tasks.    Target Date 11/08/20   Ortho Goal 3   Goal Identifier Walking   Goal Description Pt will tolerated no increase in pain with ambulation up to 1 mile in order to perform recreational walking to maintain a healthy lifestyle.   Target Date 11/08/20   Subjective Report   Subjective Report Pt reports pain is less wide spread but still about 5/10 intensity.  Pt repost    Therapeutic Procedure/exercise   Therapeutic Procedures: strength, endurance, ROM, flexibillity minutes (49696) 15   Skilled Intervention Education, exercise selection   Patient Response Tolerated well.   Treatment Detail Stretch for gastroc, soleus, plantar fascia 30 seconds. VC's and demo to perform correctly.  Great toe and 4 toe salutes.  Demonstration and VC's for correct performance.     Neuromuscular Re-education   Neuromuscular re-ed of mvmt, balance, coord, kinesthetic sense, posture, proprioception minutes (46465) 10   Skilled Intervention Balance selection and progression   Patient Response Appropriate difficulty.   Treatment Detail Tandem stance 2x 30 sec. Repeated with vertical and horizontal head turns.  Unable to progress to EC.  SLS 2 x 30 sec.  Unable to progress to head turns.     Manual Therapy   Manual Therapy: Mobilization, MFR, MLD, friction massage minutes (14149) 15   Skilled Intervention IASTM and TrP release   Patient Response Tolerated  well with improved tissue mobility.   Treatment Detail IASTM and TrP release to plantar fascia.     Education   Learner Patient   Readiness Acceptance   Method Explanation;Demonstration   Response Verbalizes Understanding;Demonstrates Understanding   Plan   Homework Maintain walking as able.   Home program Added toes salutes. Tandem stance with head turns.  Gastroc, soleus, and plantar fascia stretches for 30 seconds each.  4 way ankle strength with GTB.     Plan for next session Consider heel raises as tolerated.     Total Session Time   Timed Code Treatment Minutes 40   Total Treatment Time (sum of timed and untimed services) 40   AMBULATORY CLINICS ONLY-MEDICAL AND TREATMENT DIAGNOSIS   Medical Diagnosis R plantar fasciitis   PT Diagnosis R plantar fasciitis

## 2020-10-08 ENCOUNTER — HOSPITAL ENCOUNTER (OUTPATIENT)
Dept: PHYSICAL THERAPY | Facility: CLINIC | Age: 55
Setting detail: THERAPIES SERIES
End: 2020-10-08
Attending: PODIATRIST
Payer: COMMERCIAL

## 2020-10-08 PROCEDURE — 97140 MANUAL THERAPY 1/> REGIONS: CPT | Mod: GP | Performed by: PHYSICAL THERAPIST

## 2020-10-08 PROCEDURE — 97110 THERAPEUTIC EXERCISES: CPT | Mod: GP | Performed by: PHYSICAL THERAPIST

## 2020-10-08 PROCEDURE — 97112 NEUROMUSCULAR REEDUCATION: CPT | Mod: GP | Performed by: PHYSICAL THERAPIST

## 2020-10-12 ENCOUNTER — OFFICE VISIT (OUTPATIENT)
Dept: PODIATRY | Facility: CLINIC | Age: 55
End: 2020-10-12
Payer: COMMERCIAL

## 2020-10-12 VITALS
SYSTOLIC BLOOD PRESSURE: 118 MMHG | WEIGHT: 200 LBS | BODY MASS INDEX: 32.14 KG/M2 | HEIGHT: 66 IN | DIASTOLIC BLOOD PRESSURE: 72 MMHG

## 2020-10-12 DIAGNOSIS — M72.2 PLANTAR FASCIITIS, RIGHT: Primary | ICD-10-CM

## 2020-10-12 DIAGNOSIS — M72.2 PLANTAR FASCIITIS OF LEFT FOOT: ICD-10-CM

## 2020-10-12 DIAGNOSIS — M76.72 PERONEAL TENDINITIS, LEFT: ICD-10-CM

## 2020-10-12 PROCEDURE — 99213 OFFICE O/P EST LOW 20 MIN: CPT | Performed by: PODIATRIST

## 2020-10-12 RX ORDER — DICLOFENAC SODIUM 75 MG/1
75 TABLET, DELAYED RELEASE ORAL 2 TIMES DAILY
Qty: 60 TABLET | Refills: 1 | Status: SHIPPED | OUTPATIENT
Start: 2020-10-12 | End: 2022-08-04

## 2020-10-12 ASSESSMENT — MIFFLIN-ST. JEOR: SCORE: 1528.94

## 2020-10-12 ASSESSMENT — PAIN SCALES - GENERAL: PAINLEVEL: NO PAIN (0)

## 2020-10-12 NOTE — LETTER
"    10/12/2020         RE: Radha Mcdermott  [address] [facility] [address]        Dear Colleague,    Thank you for referring your patient, Radha Mcdermott, to the [facility]. Please see a copy of my visit note below.    Chief Complaint   Patient presents with     RECHECK     new shoes, PT, no pain today - Right plantar fasciitis, Left peroneal tendinitis, onset early Aug 2020; LOV 9/10/2020     Weight management plan: Patient was referred to their PCP to discuss a diet and exercise plan.     HPI:  Radha Mcdermott is a 55 year old female who is seen in consultation at the request of self.    Pt presents for eval of:   (Onset, Location, L/R, Character, Treatments, Injury if yes)    XR Left and Right foot today 9/10/2020     Ongoing early Aug 2020, lump lateral Left foot, rolled foot and felt a \"crack\", plantar Right heel pain that radiates posterior Right achilles. Presents today with supportive athletic shoes, orthotics. LOV 10/31/2019 for left plantar fasciitis that is improving with orthotics, shoes, night splint  Pain is much improved bilateral still some discomfort about the lateral fifth metatarsal and peroneal brevis insertion but overall 80% improved with orthotics oral anti-inflammatory Voltaren, and sturdy shoes.    Works as a [employer].     EXAM:Vitals: /72 (BP Location: Left arm, Patient Position: Sitting, Cuff Size: Adult Regular)   Ht 1.676 m (5' 6\")   Wt 90.7 kg (200 lb)   BMI 32.28 kg/m    BMI= Body mass index is 32.28 kg/m .    General appearance: Patient is alert and fully cooperative with history & exam.  No sign of distress is noted during the visit.     Psychiatric: Affect is pleasant & appropriate.  Patient appears motivated to improve health.     Respiratory: Breathing is regular & unlabored while sitting.     HEENT: Hearing is intact to spoken word.  Speech is clear.  No gross evidence of visual impairment that would " impact ambulation.     Vascular: DP & PT pulses are intact & regular bilaterally.  No significant edema or varicosities noted.  CFT and skin temperature is normal to both lower extremities.     Neurologic: Lower extremity sensation is intact to light touch.  No evidence of weakness or contracture in the lower extremities.  No evidence of neuropathy.    Dermatologic: Skin is intact to both lower extremities with adequate texture, turgor and tone about the integument.  No paronychia or evidence of soft tissue infection is noted.     Musculoskeletal: Patient is ambulatory without assistive device or brace.  Generalized valgus noted bilateral.  Much reduced area of discomfort at the fifth metatarsal base not proximal to this but rather at the base itself.  Manual muscle strength +5/5 to all 4 quadrants.  No palpable edema erythema or heat.  Right foot minimal discomfort is noted along the medial band of the plantar fascia.   No erythema or heat.  No pain with compression of the calcaneus medial to lateral.  No pain on the right foot with palpation of the Achilles peroneal or posterior tibial tendon.  Patient is obese    Radiographs: 3 views bilateral demonstrate no acute cortical reaction.  No joint diastases.  Mild pes valgus noted bilateral.       ASSESSMENT:       ICD-10-CM    1. Plantar fasciitis, right  M72.2 diclofenac (VOLTAREN) 75 MG EC tablet   2. Peroneal tendinitis, left  M76.72 diclofenac (VOLTAREN) 75 MG EC tablet   3. Plantar fasciitis of left foot  M72.2 diclofenac (VOLTAREN) 75 MG EC tablet        PLAN:  Reviewed patient's chart in [facility].      9/10/2020   Obtained and interpreted radiographs today  She has mild peroneal insertional tendinitis on the left foot.  Also plantar fasciitis symptoms along the medial band of the plantar fascia through the central arch on the right foot.  No palpable rupture or visible or palpable edema.  Recommended very strict utilization of custom molded orthotics no barefoot  walking.  Patient does demonstrate flip-flop 10 so I suspect much of this comes from not supporting her feet adequately.  Recommended minimizing changes in shoe gear.  Begin oral anti-inflammatory Voltaren Rx prescribed today  Begin physical therapy as this was helpful in the past.    10/12/2020  Patient's right foot plantar fasciitis is improved considerably in her left foot insertional peroneal brevis tendinitis is also improved.  Orthotics conform well and her shoes are appropriate  Recommend continued stretching and utilize the night splint all night every night.  Refilled Voltaren.  Follow-up in 1 month.  May discontinue Voltaren when symptoms are resolved continue night splint until symptoms are resolved x3 or 4 weeks.    May provide a second night splint for her if she requests.      Juanpablo Baig DPM      Again, thank you for allowing me to participate in the care of your patient.        Sincerely,        Juanpablo Baig DPM

## 2020-10-12 NOTE — PROGRESS NOTES
"Chief Complaint   Patient presents with     RECHECK     new shoes, PT, no pain today - Right plantar fasciitis, Left peroneal tendinitis, onset early Aug 2020; LOV 9/10/2020     Weight management plan: Patient was referred to their PCP to discuss a diet and exercise plan.     HPI:  Radha Mcdermott is a 55 year old female who is seen in consultation at the request of self.    Pt presents for eval of:   (Onset, Location, L/R, Character, Treatments, Injury if yes)    XR Left and Right foot today 9/10/2020     Ongoing early Aug 2020, lump lateral Left foot, rolled foot and felt a \"crack\", plantar Right heel pain that radiates posterior Right achilles. Presents today with supportive athletic shoes, orthotics. LOV 10/31/2019 for left plantar fasciitis that is improving with orthotics, shoes, night splint  Pain is much improved bilateral still some discomfort about the lateral fifth metatarsal and peroneal brevis insertion but overall 80% improved with orthotics oral anti-inflammatory Voltaren, and sturdy shoes.    Works as a [employer].     EXAM:Vitals: /72 (BP Location: Left arm, Patient Position: Sitting, Cuff Size: Adult Regular)   Ht 1.676 m (5' 6\")   Wt 90.7 kg (200 lb)   BMI 32.28 kg/m    BMI= Body mass index is 32.28 kg/m .    General appearance: Patient is alert and fully cooperative with history & exam.  No sign of distress is noted during the visit.     Psychiatric: Affect is pleasant & appropriate.  Patient appears motivated to improve health.     Respiratory: Breathing is regular & unlabored while sitting.     HEENT: Hearing is intact to spoken word.  Speech is clear.  No gross evidence of visual impairment that would impact ambulation.     Vascular: DP & PT pulses are intact & regular bilaterally.  No significant edema or varicosities noted.  CFT and skin temperature is normal to both lower extremities.     Neurologic: Lower extremity sensation is intact to light touch. "  No evidence of weakness or contracture in the lower extremities.  No evidence of neuropathy.    Dermatologic: Skin is intact to both lower extremities with adequate texture, turgor and tone about the integument.  No paronychia or evidence of soft tissue infection is noted.     Musculoskeletal: Patient is ambulatory without assistive device or brace.  Generalized valgus noted bilateral.  Much reduced area of discomfort at the fifth metatarsal base not proximal to this but rather at the base itself.  Manual muscle strength +5/5 to all 4 quadrants.  No palpable edema erythema or heat.  Right foot minimal discomfort is noted along the medial band of the plantar fascia.   No erythema or heat.  No pain with compression of the calcaneus medial to lateral.  No pain on the right foot with palpation of the Achilles peroneal or posterior tibial tendon.  Patient is obese    Radiographs: 3 views bilateral demonstrate no acute cortical reaction.  No joint diastases.  Mild pes valgus noted bilateral.       ASSESSMENT:       ICD-10-CM    1. Plantar fasciitis, right  M72.2 diclofenac (VOLTAREN) 75 MG EC tablet   2. Peroneal tendinitis, left  M76.72 diclofenac (VOLTAREN) 75 MG EC tablet   3. Plantar fasciitis of left foot  M72.2 diclofenac (VOLTAREN) 75 MG EC tablet        PLAN:  Reviewed patient's chart in [facility].      9/10/2020   Obtained and interpreted radiographs today  She has mild peroneal insertional tendinitis on the left foot.  Also plantar fasciitis symptoms along the medial band of the plantar fascia through the central arch on the right foot.  No palpable rupture or visible or palpable edema.  Recommended very strict utilization of custom molded orthotics no barefoot walking.  Patient does demonstrate flip-flop 10 so I suspect much of this comes from not supporting her feet adequately.  Recommended minimizing changes in shoe gear.  Begin oral anti-inflammatory Voltaren Rx prescribed today  Begin physical therapy as this  was helpful in the past.    10/12/2020  Patient's right foot plantar fasciitis is improved considerably in her left foot insertional peroneal brevis tendinitis is also improved.  Orthotics conform well and her shoes are appropriate  Recommend continued stretching and utilize the night splint all night every night.  Refilled Voltaren.  Follow-up in 1 month.  May discontinue Voltaren when symptoms are resolved continue night splint until symptoms are resolved x3 or 4 weeks.    May provide a second night splint for her if she requests.      Juanpablo Baig DPM

## 2020-10-13 ENCOUNTER — HOSPITAL ENCOUNTER (OUTPATIENT)
Dept: PHYSICAL THERAPY | Facility: CLINIC | Age: 55
Setting detail: THERAPIES SERIES
End: 2020-10-13
Attending: PODIATRIST
Payer: COMMERCIAL

## 2020-10-13 PROCEDURE — 97112 NEUROMUSCULAR REEDUCATION: CPT | Mod: GP | Performed by: PHYSICAL THERAPIST

## 2020-10-13 PROCEDURE — 97140 MANUAL THERAPY 1/> REGIONS: CPT | Mod: GP | Performed by: PHYSICAL THERAPIST

## 2020-10-13 PROCEDURE — 97110 THERAPEUTIC EXERCISES: CPT | Mod: GP | Performed by: PHYSICAL THERAPIST

## 2020-10-16 ENCOUNTER — HOSPITAL ENCOUNTER (OUTPATIENT)
Dept: PHYSICAL THERAPY | Facility: CLINIC | Age: 55
Setting detail: THERAPIES SERIES
End: 2020-10-16
Attending: PODIATRIST
Payer: COMMERCIAL

## 2020-10-16 PROCEDURE — 97140 MANUAL THERAPY 1/> REGIONS: CPT | Mod: GP | Performed by: PHYSICAL THERAPIST

## 2020-10-16 PROCEDURE — 97110 THERAPEUTIC EXERCISES: CPT | Mod: GP | Performed by: PHYSICAL THERAPIST

## 2020-10-16 PROCEDURE — 97112 NEUROMUSCULAR REEDUCATION: CPT | Mod: GP | Performed by: PHYSICAL THERAPIST

## 2020-10-27 ENCOUNTER — HOSPITAL ENCOUNTER (OUTPATIENT)
Dept: PHYSICAL THERAPY | Facility: CLINIC | Age: 55
Setting detail: THERAPIES SERIES
End: 2020-10-27
Attending: PODIATRIST
Payer: COMMERCIAL

## 2020-10-27 PROCEDURE — 97140 MANUAL THERAPY 1/> REGIONS: CPT | Mod: GP | Performed by: PHYSICAL THERAPIST

## 2020-10-27 PROCEDURE — 97110 THERAPEUTIC EXERCISES: CPT | Mod: GP | Performed by: PHYSICAL THERAPIST

## 2020-10-27 PROCEDURE — 97112 NEUROMUSCULAR REEDUCATION: CPT | Mod: GP | Performed by: PHYSICAL THERAPIST

## 2020-10-28 DIAGNOSIS — E89.2 POST-SURGICAL HYPOPARATHYROIDISM (H): Chronic | ICD-10-CM

## 2020-10-28 LAB
CALCIUM SERPL-MCNC: 9 MG/DL (ref 8.5–10.1)
CREAT SERPL-MCNC: 1.01 MG/DL (ref 0.52–1.04)
GFR SERPL CREATININE-BSD FRML MDRD: 63 ML/MIN/{1.73_M2}
PHOSPHATE SERPL-MCNC: 4.6 MG/DL (ref 2.5–4.5)
POTASSIUM SERPL-SCNC: 4.2 MMOL/L (ref 3.4–5.3)

## 2020-10-28 PROCEDURE — 82565 ASSAY OF CREATININE: CPT

## 2020-10-28 PROCEDURE — 84100 ASSAY OF PHOSPHORUS: CPT

## 2020-10-28 PROCEDURE — 84132 ASSAY OF SERUM POTASSIUM: CPT

## 2020-10-28 PROCEDURE — 82310 ASSAY OF CALCIUM: CPT

## 2020-11-04 ENCOUNTER — HOSPITAL ENCOUNTER (OUTPATIENT)
Dept: PHYSICAL THERAPY | Facility: CLINIC | Age: 55
Setting detail: THERAPIES SERIES
End: 2020-11-04
Attending: PODIATRIST
Payer: COMMERCIAL

## 2020-11-04 PROCEDURE — 97110 THERAPEUTIC EXERCISES: CPT | Mod: GP | Performed by: PHYSICAL THERAPIST

## 2020-11-04 PROCEDURE — 97112 NEUROMUSCULAR REEDUCATION: CPT | Mod: GP | Performed by: PHYSICAL THERAPIST

## 2020-11-04 PROCEDURE — 97140 MANUAL THERAPY 1/> REGIONS: CPT | Mod: GP | Performed by: PHYSICAL THERAPIST

## 2020-11-06 ENCOUNTER — HOSPITAL ENCOUNTER (OUTPATIENT)
Dept: ULTRASOUND IMAGING | Facility: CLINIC | Age: 55
Discharge: HOME | End: 2020-11-06
Payer: COMMERCIAL

## 2020-11-06 DIAGNOSIS — C73 PAPILLARY THYROID CARCINOMA (H): ICD-10-CM

## 2020-11-06 PROCEDURE — 76536 US EXAM OF HEAD AND NECK: CPT

## 2020-11-11 ENCOUNTER — HOSPITAL ENCOUNTER (OUTPATIENT)
Dept: PHYSICAL THERAPY | Facility: CLINIC | Age: 55
Setting detail: THERAPIES SERIES
End: 2020-11-11
Attending: PODIATRIST
Payer: COMMERCIAL

## 2020-11-11 PROCEDURE — 97110 THERAPEUTIC EXERCISES: CPT | Mod: GP | Performed by: PHYSICAL THERAPIST

## 2020-11-11 PROCEDURE — 97140 MANUAL THERAPY 1/> REGIONS: CPT | Mod: GP | Performed by: PHYSICAL THERAPIST

## 2020-11-23 ENCOUNTER — HOSPITAL ENCOUNTER (OUTPATIENT)
Dept: PHYSICAL THERAPY | Facility: CLINIC | Age: 55
Setting detail: THERAPIES SERIES
End: 2020-11-23
Attending: PODIATRIST
Payer: COMMERCIAL

## 2020-11-23 PROCEDURE — 97110 THERAPEUTIC EXERCISES: CPT | Mod: GP | Performed by: PHYSICAL THERAPIST

## 2020-11-23 PROCEDURE — 97140 MANUAL THERAPY 1/> REGIONS: CPT | Mod: GP | Performed by: PHYSICAL THERAPIST

## 2020-11-23 NOTE — PROGRESS NOTES
Outpatient Physical Therapy Discharge Note     Patient: Radha Mcdermott  : 1965    Beginning/End Dates of Reporting Period:  20 to 2020    Referring Provider: Dr. Baig    Therapy Diagnosis: R plantar fasciitis     Client Self Report: Pt notes that pain in R foot is hit or miss.  Pt notes that she can't find a pattern but overall feels like she is improving.      Objective Measurements:                                          Outcome Measures (most recent score):  LEFS: 63/80    Goals:  Goal Identifier Walking in the morning.   Goal Description Pt will report no more than 2/10 pain with initial steps in the morning in order to get ready for the day with less pain.   Target Date 20   Date Met  20   Progress:     Goal Identifier Strength   Goal Description MMT to 5/5 on all motions in order to demonstrate appropriate ankle stability for all functional tasks.    Target Date 20   Date Met  20   Progress:     Goal Identifier Walking   Goal Description Pt will tolerated no increase in pain with ambulation up to 1 mile in order to perform recreational walking to maintain a healthy lifestyle.   Target Date 20   Date Met  (Increase after full day of walking)   Progress:     Goal Identifier     Goal Description     Target Date     Date Met      Progress:     Goal Identifier     Goal Description     Target Date     Date Met      Progress:     Goal Identifier     Goal Description     Target Date     Date Met      Progress:     Goal Identifier     Goal Description     Target Date     Date Met      Progress:     Goal Identifier     Goal Description     Target Date     Date Met      Progress:     Progress Toward Goals:   Progress this reporting period: Mostly met.          Plan:  Discharge from therapy.    Discharge:    Reason for Discharge: Patient is appropriate to self manage.    Equipment Issued: None    Discharge Plan: Patient to continue home program.

## 2021-01-09 ENCOUNTER — HEALTH MAINTENANCE LETTER (OUTPATIENT)
Age: 56
End: 2021-01-09

## 2021-02-14 DIAGNOSIS — E89.0 POSTSURGICAL HYPOTHYROIDISM: ICD-10-CM

## 2021-02-14 DIAGNOSIS — E89.2 POST-SURGICAL HYPOPARATHYROIDISM (H): Chronic | ICD-10-CM

## 2021-02-14 DIAGNOSIS — C73 PAPILLARY THYROID CARCINOMA (H): ICD-10-CM

## 2021-02-17 NOTE — TELEPHONE ENCOUNTER
MAGNESIUM OXIDE 400MG TABS  Last Written Prescription Date:  1/31/20  Last Fill Quantity: *90   # refills:3  CALCITRIOL 0.5MCG CAPS  Last Written Prescription Date: 2/4/20  Last Fill Quantity: 90,   # refills: 3  Last Office Visit : 2/04/20  Future Office visit:  2/23/2021    Routing refill request to provider for review/approval because:  Protocol

## 2021-02-18 RX ORDER — CALCITRIOL 0.5 UG/1
CAPSULE, LIQUID FILLED ORAL
Qty: 90 CAPSULE | Refills: 4 | Status: SHIPPED | OUTPATIENT
Start: 2021-02-18 | End: 2022-05-03

## 2021-02-22 NOTE — PROGRESS NOTES
Outcome for 02/22/21 12:43 PM :Left Voicemail for patient to call back     Radha MAHOGANY Mcdermott  is being evaluated via a billable video visit.      How would you like to obtain your AVS? [employer]  For the video visit, send the invitation by: Text to cell phone: [phone]  Will anyone else be joining your video visit? No

## 2021-02-23 ENCOUNTER — VIRTUAL VISIT (OUTPATIENT)
Dept: ENDOCRINOLOGY | Facility: CLINIC | Age: 56
End: 2021-02-23
Payer: COMMERCIAL

## 2021-02-23 DIAGNOSIS — C73 PAPILLARY THYROID CARCINOMA (H): Primary | ICD-10-CM

## 2021-02-23 DIAGNOSIS — E89.0 POSTSURGICAL HYPOTHYROIDISM: ICD-10-CM

## 2021-02-23 DIAGNOSIS — E89.2 POST-SURGICAL HYPOPARATHYROIDISM (H): Chronic | ICD-10-CM

## 2021-02-23 PROCEDURE — 99214 OFFICE O/P EST MOD 30 MIN: CPT | Mod: 95

## 2021-02-23 RX ORDER — LEVOTHYROXINE SODIUM 125 MCG
125 TABLET ORAL DAILY
Qty: 90 TABLET | Refills: 4 | Status: SHIPPED | OUTPATIENT
Start: 2021-02-23 | End: 2022-05-03

## 2021-02-23 NOTE — PATIENT INSTRUCTIONS
Standing orders for yearly thyroid labs (could be done now)    Standing orders for every 6 month calcium related labs (next is due in April, 2021)      See me yearly

## 2021-02-23 NOTE — PROGRESS NOTES
Assessment   1.  Papillary carcinoma thyroid, multifocal, largest tumor 5 cm, + ETE, multiple node positive. She been treated with total thyroidectomy and left neck dissection (in 3 operations) and also 132 mCi 131.  -   Labs: Tg, TSH, Free T4  Yearly standing orders  See me yearly     Hypothyroidism due to thyroidectomy. As per # 1.  Treat to target TSH < 0.4.  She is on Synthroid 125 mcg/day  Yearly standing labs     surgical hypoparathyroidism (partial) since first operation -  On calcitriol 0.5 mcg/day.  She is not taking calcium and she is intolerant of dairy; she takes Mg. In the past she had symptoms on calcitriol 0.25 mcg/day.    Standing orders for labs every 6 months.    pulmonary nodules, subcm, stable on serial imaging, most recent 3/19. I am noting this as it still may be relevant to # 1      Due to the COVID 19 pandemic this visit was a telephone/video visit in order to help prevent spread of infection in this high risk patient and the general population. The patient gave verbal consent for the visit today.    I have independently reviewed and interpreted labs, imaging as indicated.     Chart review/prep time 1  [id number]; 7326-9131; 2987-0714  Chart review/prep time 2 1523-  Visit Start time 1513  Visit Stop time  1523  _20_ minutes spent on the date of the encounter doing chart review, history and exam, documentation and further activities as noted above.       Vero Arevalo MD  Endocrinology & Diabetes.      Cc/ HPI  55  year old seen for post surgical hypothyroidism, post surgical hypoparathyroidism and Papillary thyroid cancer.   She was last seen by me 2/2020.      Thyroid cancer has been treated with 3 operations  10/7/05   Thyroidectomy removing  left sided PCT 5.0 x 3.0 x 2.8 cm, extending through the capsule into parathyroidal soft tissues.  There was - Microscopic foci of papillary carcinoma in the right lobe and one LN removed was positive for pCT.  Her post operative course was   complicated by hypoparathyroidism.   132 mCi 131I 11/05.     12/28/06   Left modified neck dissection.    10/22/09 selective neck dissection to include level 7 and level 4 on the left.  Surgical pathology was positive for metastatic PCT in lymph nodes (see results)    We have the following tumor marker data:  9/9/05: Tg 290, JUAN DIEGO < 1, TSH   Operation # 1  10/28/05: Tg 131, JUAN DIEGO < 1, TSH 15.12  11/28/05: Tg 116, JUAN DIEGO <1, ,7  2/3/06: Tg 26.7, JUAN DIEGO < 1, TSH 14.43  6/2/06: Tg 2.2, JUAN DIEGO < 1, TSH 0.36  11/3/06: Tg 18, JUAN DIEGO < 1  Operation # 2  1/30/07: Tg 1.1, JUAN DIEGO < 1, TSH   4/17/07: Tg 2, JUAN DIEGO < 1, TSH   12/11/07: Tg 13.9, JUAN DIEGO < 1, TSH 42.02  1/8/08: Tg 2.7, JUAN DIEGO < 1, TSH   3/3/09: Tg 1.0, JUAN DIEGO < 0.4, TSH   4/24/09: Tg 4667, JUAN DIEGO < 0.4  Operation # 3  1/6/10: Tg 0.12, JUAN DIEGO < 0.4, TSH < 0.03  10/17/11: Tg 0.3, JUAN DIEGO < 20,   1/20/12: Tg 3.1, JUAN DIEGO < 20  3/21/12: Tg 0.1, JUAN DIEGO < 20, TSH 0.05  4/26/12: Tg 0.1, JUAN DIEGO < 20, TSH 0.02  10/26/12: Tg 3.5, JUAN DIEGO < 20, TSH 0.02 TBS -- supposedly thyrogen was given but the results and dates don't quite fit  4/26/13: Tg 0.2, JUAN DIEGO < 20, TSH 0.01  11/8/13: Tg 3.3, JUAN DIEGO < 20 TBS negative  2/10/14: Tg 0.1, JUAN DIEGO < 20  8/18/14: Tg 0.1, JUAN DIEGO < 20, TSH 0.00  11/21/14: Tg 4 ng/ml (athyrotic < 0.1, intact < 33 [place] [facility]) JUAN DIEGO < 1.8,TSH 0.01  12/30/14: TSH 0.01  3/4/15; Tg 0.19, JUAN DIEGO < 0.4, TSH  <0.02 ; concurrent remeasure of 1/6/10 Tg 0.15  9/2/15: Tg 0.11, JUAN DIEGO < 0.4, TSH 0.01  5/26/16: Tg 0.27, JUAN DIEGO < 0.4, TSH 0.55  12/13/16: Tg 0.14, JUAN DIEGO < 0.4, TSH 0.07, free T4 1.32  6/13/17: Tg 0.17, JUAN DIEGO < 0.4, TSH 0.11-   9/24/18: Tg 0.18, JUAN DIEGO < 0.4, TSH 0.14 , free T 4 1.22,   10/10/18 24 hour urine calcium 80 mg/24 hours  11/30/18 TSH 7.49 - seen by me today for lst time.   2/4/2020: Tg < 0.1, JUAN DIEGO < 0.4, TSH 0.38, free T4 1.52, Ca 8.4, phos 3.6, creatinine 1.09, K 3.5-   10/28/2020 Ca 9, phos 4.6, creatinine 1.01, K 4.2,       past imaging on PACs.    3/4/09 neck US: left level 7 lymph node, ? left level 4  5/27/09  PET/CT negative to my eye    Outside imaging reports:  10/26/12: 4 mCi 131I TBS negative  11/6/13: 4 mCi 131I TBS: negative  12/23/14: PET CT without contrast: focal left ovarian avidity of uncertain significance (also seen on outside PET 2010)-- t  6/7/16 US neck negative  6/7/16 CT chest: scattered subcm nodules  3/13/19 chest CT: stable to 2016   11/6/2020: negative.     ROS   She has not had COVID  Weight is stable around 195  Tired  Sleep is inadequate- don't sleep at night - bed 1030 and up at 0500; up multiple times throughout the night and can't get back to sleep  Doing OK  No paresthesias  No cramps  Asking about more imaging , asking about past lung nodules.     PMH   Past Medical History:   Diagnosis Date     Abnormal Papanicolaou smear of cervix and cervical HPV 12/03    ASCUS - repeat paps all ok.      Depressive disorder, not elsewhere classified      Dysmenorrhea      Fracture of arm age 14    right     Gestational diabetes      Papillary thyroid carcinoma (H) 10/05    5 cm, MF, + ETE, node+; 3 operations, 131I     Postsurgical hypoparathyroidism (H) 2005     Postsurgical hypothyroidism 2005     Unspecified contraceptive management      Past Surgical History:   Procedure Laterality Date     BIOPSY/REMOVAL, LYMPH NODE(S)  10/27/2009    Surgical resection of lymph nodes, neck.  Scar revision. [facility].     HC BIOPSY/EXCISION LYMPH NODE OPEN SUPERFICIAL  2007    removal of some more lymph nodes in the neck.      HC REMOVE TONSILS/ADENOIDS,14+ Y/O  1984     HC THYROIDECTOMY  10/07/2005    Total thyroidectomy.  F-[facility]. Further lymph node resection in 12/06       Current Outpatient Medications   Medication Sig Dispense Refill     calcitRIOL (ROCALTROL) 0.5 MCG capsule TAKE ONE CAPSULE BY MOUTH ONCE DAILY 90 capsule 4     diclofenac (VOLTAREN) 75 MG EC tablet Take 1 tablet (75 mg) by mouth 2 times daily 60 tablet 1     diclofenac (VOLTAREN) 75 MG EC tablet Take 1 tablet (75 mg) by mouth 2 times daily 60 tablet  1     docusate sodium 100 MG tablet Take 100 mg by mouth 2 times daily as needed for constipation 60 tablet 11     EPINEPHrine (EPIPEN) 0.3 MG/0.3ML injection Inject 0.3 mg into the muscle once as needed for anaphylaxis       magnesium oxide (MAG-OX) 400 (241.3 Mg) MG tablet TAKE ONE-HALF TABLET BY MOUTH TWICE A DAY 90 tablet 4     SYNTHROID 125 MCG tablet Take 1 tablet (125 mcg) by mouth daily Separate iron or calcium-containing products and levothyroxine by at least 4 hours. 90 tablet 3       She is not on vitamin D or calcium.     Family History   Problem Relation Age of Onset     Lipids Mother      Osteoporosis Mother      Cancer Father         skin     Dementia Father      Cancer Paternal Grandfather         prostate cancer     Alzheimer Disease Paternal Grandmother      Heart Disease Paternal Grandmother      Cerebrovascular Disease Paternal Grandmother      Thyroid Disease Paternal Grandmother      Diabetes Maternal Grandmother      Thyroid Disease Maternal Grandmother         thyroid disease     Allergies Son         pcn     Cancer Maternal Aunt         breast - mid-30s       Social History     Tobacco Use     Smoking status: Former Smoker     Packs/day: 0.50     Years: 15.00     Pack years: 7.50     Quit date: 3/1/1998     Years since quittin.0     Smokeless tobacco: Never Used     Tobacco comment: no smoking in the home   Substance Use Topics     Alcohol use: Yes     Comment: social     Drug use: No     Works at pharmacy in [place]    GENERAL: no distress; she looks thinner  BP Readings from Last 1 Encounters:   10/12/20 118/72      Pulse Readings from Last 1 Encounters:   20 98      Resp Readings from Last 1 Encounters:   10/30/19 16      Temp Readings from Last 1 Encounters:   10/30/19 98  F (36.7  C) (Temporal)      SpO2 Readings from Last 1 Encounters:   10/30/19 98%      Wt Readings from Last 1 Encounters:   10/12/20 90.7 kg (200 lb)      Ht Readings from Last 1 Encounters:   10/12/20  "1.676 m (5' 6\")     SKIN: Visible skin clear. No significant rash, abnormal pigmentation or lesions.  EYES: Eyes grossly normal to inspection.  No discharge or erythema, or obvious scleral/conjunctival abnormalities.  NECK: no visible masses;  RESP: No audible wheeze, cough, or visible cyanosis.  No visible retractions or increased work of breathing.    NEURO:   Awake, alert, responds appropriately to questions.  Mentation and speech fluent.  PSYCH:affect normal,  and appearance well-groomed.      Results for MELONY LOPEZ (MRN [id number]) as of 2/23/2021 13:42   Ref. Range 2/4/2020 13:38 9/10/2020 11:29 10/28/2020 12:17   Potassium Latest Ref Range: 3.4 - 5.3 mmol/L 3.5  4.2   Creatinine Latest Ref Range: 0.52 - 1.04 mg/dL 1.09 (H)  1.01   GFR Estimate Latest Ref Range: >60 mL/min/1.73_m2 58 (L)  63   GFR Estimate If Black Latest Ref Range: >60 mL/min/1.73_m2 67  73   Calcium Latest Ref Range: 8.5 - 10.1 mg/dL 8.4 (L)  9.0   Phosphorus Latest Ref Range: 2.5 - 4.5 mg/dL 3.6  4.6 (H)   T4 Free Latest Ref Range: 0.76 - 1.46 ng/dL 1.52 (H)     TSH Latest Ref Range: 0.40 - 4.00 mU/L 0.38 (L)     Lab Scanned Result Unknown THYROG-Scanned         ULTRASOUND HEAD AND NECK SOFT TISSUE 11/6/2020 3:30 PM     CLINICAL HISTORY: Papillary thyroid carcinoma (H). Status post  thyroidectomy.     TECHNIQUE: Routine.     COMPARISON: 6/7/2016.     FINDINGS: The exam consists of a few static images and otherwise  largely cine clips. There are a few small scattered lymph nodes  identified along both sides of the neck. These lymph nodes are not  pathologically enlarged.                                                                      IMPRESSION:  1.  The patient is status post thyroidectomy.  2.  Small scattered lymph nodes identified along both sides of the  neck, without pathologically enlarged lymph nodes. No findings  suspicious for recurrent or metastatic disease in the neck.     ETIENNE HERNANDEZ MD  "

## 2021-02-23 NOTE — LETTER
2/23/2021       RE: Radha Mcdermott  [address] [place] [facility] [address]     Dear Colleague,    Thank you for referring your patient, Radha Mcdermott, to the [facility] [place] [facility]. Please see a copy of my visit note below.    Outcome for 02/22/21 12:43 PM :Left Voicemail for patient to call back     Radha Mcdermott  is being evaluated via a billable video visit.      How would you like to obtain your AVS? MyChart  For the video visit, send the invitation by: Text to cell phone: [phone]  Will anyone else be joining your video visit? No               Assessment   1.  Papillary carcinoma thyroid, multifocal, largest tumor 5 cm, + ETE, multiple node positive. She been treated with total thyroidectomy and left neck dissection (in 3 operations) and also 132 mCi 131.  -   Labs: Tg, TSH, Free T4  Yearly standing orders  See me yearly     Hypothyroidism due to thyroidectomy. As per # 1.  Treat to target TSH < 0.4.  She is on Synthroid 125 mcg/day  Yearly standing labs     surgical hypoparathyroidism (partial) since first operation -  On calcitriol 0.5 mcg/day.  She is not taking calcium and she is intolerant of dairy; she takes Mg. In the past she had symptoms on calcitriol 0.25 mcg/day.    Standing orders for labs every 6 months.    pulmonary nodules, subcm, stable on serial imaging, most recent 3/19. I am noting this as it still may be relevant to # 1      Due to the COVID 19 pandemic this visit was a telephone/video visit in order to help prevent spread of infection in this high risk patient and the general population. The patient gave verbal consent for the visit today.    I have independently reviewed and interpreted labs, imaging as indicated.     Chart review/prep time 1  9097-7596; 0659-4731; 1425-5131  Chart review/prep time 2 1523-  Visit Start time 1513  Visit Stop time  1523  _20_ minutes spent on the date of the  encounter doing chart review, history and exam, documentation and further activities as noted above.       Vero Arevalo MD  Endocrinology & Diabetes.      Cc/ HPI  55  year old seen for post surgical hypothyroidism, post surgical hypoparathyroidism and Papillary thyroid cancer.   She was last seen by me 2/2020.      Thyroid cancer has been treated with 3 operations  10/7/05   Thyroidectomy removing  left sided PCT 5.0 x 3.0 x 2.8 cm, extending through the capsule into parathyroidal soft tissues.  There was - Microscopic foci of papillary carcinoma in the right lobe and one LN removed was positive for pCT.  Her post operative course was  complicated by hypoparathyroidism.   132 mCi 131I 11/05.     12/28/06   Left modified neck dissection.    10/22/09 selective neck dissection to include level 7 and level 4 on the left.  Surgical pathology was positive for metastatic PCT in lymph nodes (see results)    We have the following tumor marker data:  9/9/05: Tg 290, JUAN DIEGO < 1, TSH   Operation # 1  10/28/05: Tg 131, JUAN DIEGO < 1, TSH 15.12  11/28/05: Tg 116, JUAN DIEGO <1, ,7  2/3/06: Tg 26.7, JUAN DIEGO < 1, TSH 14.43  6/2/06: Tg 2.2, JUAN DIEGO < 1, TSH 0.36  11/3/06: Tg 18, JUAN DIEGO < 1  Operation # 2  1/30/07: Tg 1.1, JUAN DIEGO < 1, TSH   4/17/07: Tg 2, JUAN DIEGO < 1, TSH   12/11/07: Tg 13.9, JUAN DIEGO < 1, TSH 42.02  1/8/08: Tg 2.7, JUAN DIEGO < 1, TSH   3/3/09: Tg 1.0, JUAN DIEGO < 0.4, TSH   4/24/09: Tg 4667, JUAN DIEGO < 0.4  Operation # 3  1/6/10: Tg 0.12, JUAN DIEGO < 0.4, TSH < 0.03  10/17/11: Tg 0.3, JUAN DIEGO < 20,   1/20/12: Tg 3.1, JUAN DIEGO < 20  3/21/12: Tg 0.1, JUAN DIEGO < 20, TSH 0.05  4/26/12: Tg 0.1, JUAN DIEGO < 20, TSH 0.02  10/26/12: Tg 3.5, JUAN DIEGO < 20, TSH 0.02 TBS -- supposedly thyrogen was given but the results and dates don't quite fit  4/26/13: Tg 0.2, JUAN DIEGO < 20, TSH 0.01  11/8/13: Tg 3.3, JUAN DIEGO < 20 TBS negative  2/10/14: Tg 0.1, JUAN DIEGO < 20  8/18/14: Tg 0.1, JUAN DIEGO < 20, TSH 0.00  11/21/14: Tg 4 ng/ml (athyrotic < 0.1, intact < 33 [place] [facility]) JUAN DIEGO < 1.8,TSH 0.01  12/30/14: TSH 0.01  3/4/15;  Tg 0.19, JUAN DIEGO < 0.4, TSH  <0.02 ; concurrent remeasure of 1/6/10 Tg 0.15  9/2/15: Tg 0.11, JUAN DIEGO < 0.4, TSH 0.01  5/26/16: Tg 0.27, JUAN DIEGO < 0.4, TSH 0.55  12/13/16: Tg 0.14, JUAN DIEGO < 0.4, TSH 0.07, free T4 1.32  6/13/17: Tg 0.17, JUAN DIEGO < 0.4, TSH 0.11-   9/24/18: Tg 0.18, JUAN DIEGO < 0.4, TSH 0.14 , free T 4 1.22,   10/10/18 24 hour urine calcium 80 mg/24 hours  11/30/18 TSH 7.49 - seen by me today for lst time.   2/4/2020: Tg < 0.1, JUAN DIEGO < 0.4, TSH 0.38, free T4 1.52, Ca 8.4, phos 3.6, creatinine 1.09, K 3.5-   10/28/2020 Ca 9, phos 4.6, creatinine 1.01, K 4.2,       past imaging on PACs.    3/4/09 neck US: left level 7 lymph node, ? left level 4  5/27/09 PET/CT negative to my eye    Outside imaging reports:  10/26/12: 4 mCi 131I TBS negative  11/6/13: 4 mCi 131I TBS: negative  12/23/14: PET CT without contrast: focal left ovarian avidity of uncertain significance (also seen on outside PET 2010)-- t  6/7/16 US neck negative  6/7/16 CT chest: scattered subcm nodules  3/13/19 chest CT: stable to 2016 11/6/2020: negative.     ROS   She has not had COVID  Weight is stable around 195  Tired  Sleep is inadequate- don't sleep at night - bed 1030 and up at 0500; up multiple times throughout the night and can't get back to sleep  Doing OK  No paresthesias  No cramps  Asking about more imaging , asking about past lung nodules.     PMH   Past Medical History:   Diagnosis Date     Abnormal Papanicolaou smear of cervix and cervical HPV 12/03    ASCUS - repeat paps all ok.      Depressive disorder, not elsewhere classified      Dysmenorrhea      Fracture of arm age 14    right     Gestational diabetes      Papillary thyroid carcinoma (H) 10/05    5 cm, MF, + ETE, node+; 3 operations, 131I     Postsurgical hypoparathyroidism (H) 2005     Postsurgical hypothyroidism 2005     Unspecified contraceptive management      Past Surgical History:   Procedure Laterality Date     BIOPSY/REMOVAL, LYMPH NODE(S)  10/27/2009    Surgical resection of lymph  nodes, neck.  Scar revision. [facility].     HC BIOPSY/EXCISION LYMPH NODE OPEN SUPERFICIAL  2007    removal of some more lymph nodes in the neck.      HC REMOVE TONSILS/ADENOIDS,14+ Y/O       HC THYROIDECTOMY  10/07/2005    Total thyroidectomy.  F-[facility]. Further lymph node resection in        Current Outpatient Medications   Medication Sig Dispense Refill     calcitRIOL (ROCALTROL) 0.5 MCG capsule TAKE ONE CAPSULE BY MOUTH ONCE DAILY 90 capsule 4     diclofenac (VOLTAREN) 75 MG EC tablet Take 1 tablet (75 mg) by mouth 2 times daily 60 tablet 1     diclofenac (VOLTAREN) 75 MG EC tablet Take 1 tablet (75 mg) by mouth 2 times daily 60 tablet 1     docusate sodium 100 MG tablet Take 100 mg by mouth 2 times daily as needed for constipation 60 tablet 11     EPINEPHrine (EPIPEN) 0.3 MG/0.3ML injection Inject 0.3 mg into the muscle once as needed for anaphylaxis       magnesium oxide (MAG-OX) 400 (241.3 Mg) MG tablet TAKE ONE-HALF TABLET BY MOUTH TWICE A DAY 90 tablet 4     SYNTHROID 125 MCG tablet Take 1 tablet (125 mcg) by mouth daily Separate iron or calcium-containing products and levothyroxine by at least 4 hours. 90 tablet 3       She is not on vitamin D or calcium.     Family History   Problem Relation Age of Onset     Lipids Mother      Osteoporosis Mother      Cancer Father         skin     Dementia Father      Cancer Paternal Grandfather         prostate cancer     Alzheimer Disease Paternal Grandmother      Heart Disease Paternal Grandmother      Cerebrovascular Disease Paternal Grandmother      Thyroid Disease Paternal Grandmother      Diabetes Maternal Grandmother      Thyroid Disease Maternal Grandmother         thyroid disease     Allergies Son         pcn     Cancer Maternal Aunt         breast - mid-30s       Social History     Tobacco Use     Smoking status: Former Smoker     Packs/day: 0.50     Years: 15.00     Pack years: 7.50     Quit date: 3/1/1998     Years since quittin.0     Smokeless  "tobacco: Never Used     Tobacco comment: no smoking in the home   Substance Use Topics     Alcohol use: Yes     Comment: social     Drug use: No     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 No significant rash, abnormal pigmentation or lesions.  EYES: Eyes grossly normal to inspection.  No discharge or erythema, or obvious scleral/conjunctival abnormalities.  NECK: no visible masses;  RESP: No audible wheeze, cough, or visible cyanosis.  No visible retractions or increased work of breathing.    NEURO:   Awake, alert, responds appropriately to questions.  Mentation and speech fluent.  PSYCH:affect normal,  and appearance well-groomed.      Results for MELONY LOPEZ (MRN [id number]) as of 2/23/2021 13:42   Ref. Range 2/4/2020 13:38 9/10/2020 11:29 10/28/2020 12:17   Potassium Latest Ref Range: 3.4 - 5.3 mmol/L 3.5  4.2   Creatinine Latest Ref Range: 0.52 - 1.04 mg/dL 1.09 (H)  1.01   GFR Estimate Latest Ref Range: >60 mL/min/1.73_m2 58 (L)  63   GFR Estimate If Black Latest Ref Range: >60 mL/min/1.73_m2 67  73   Calcium Latest Ref Range: 8.5 - 10.1 mg/dL 8.4 (L)  9.0   Phosphorus Latest Ref Range: 2.5 - 4.5 mg/dL 3.6  4.6 (H)   T4 Free Latest Ref Range: 0.76 - 1.46 ng/dL 1.52 (H)     TSH Latest Ref Range: 0.40 - 4.00 mU/L 0.38 (L)     Lab Scanned Result Unknown THYROG-Scanned         ULTRASOUND HEAD AND NECK SOFT TISSUE 11/6/2020 3:30 PM     CLINICAL HISTORY: Papillary thyroid carcinoma (H). Status " post  thyroidectomy.     TECHNIQUE: Routine.     COMPARISON: 6/7/2016.     FINDINGS: The exam consists of a few static images and otherwise  largely cine clips. There are a few small scattered lymph nodes  identified along both sides of the neck. These lymph nodes are not  pathologically enlarged.                                                                      IMPRESSION:  1.  The patient is status post thyroidectomy.  2.  Small scattered lymph nodes identified along both sides of the  neck, without pathologically enlarged lymph nodes. No findings  suspicious for recurrent or metastatic disease in the neck.     ETIENNE HERNANDEZ MD

## 2021-03-13 ENCOUNTER — HEALTH MAINTENANCE LETTER (OUTPATIENT)
Age: 56
End: 2021-03-13

## 2021-10-23 ENCOUNTER — HEALTH MAINTENANCE LETTER (OUTPATIENT)
Age: 56
End: 2021-10-23

## 2022-02-12 ENCOUNTER — HEALTH MAINTENANCE LETTER (OUTPATIENT)
Age: 57
End: 2022-02-12

## 2022-03-17 DIAGNOSIS — E89.2 POST-SURGICAL HYPOPARATHYROIDISM (H): Primary | ICD-10-CM

## 2022-03-23 RX ORDER — MAGNESIUM OXIDE 400 MG/1
200 TABLET ORAL 2 TIMES DAILY
Qty: 90 TABLET | Refills: 4 | Status: SHIPPED | OUTPATIENT
Start: 2022-03-23 | End: 2023-05-08

## 2022-03-23 NOTE — TELEPHONE ENCOUNTER
magnesium oxide (MAG-OX) 400 (241.3 Mg) MG tablet     TAKE ONE-HALF TABLET BY MOUTH TWICE A DAY   Last Written Prescription Date:  2/18/21  Last Fill Quantity: 90,   # refills: 4  Last Office Visit : 2/4/20  Future Office visit:  none    Routing refill request to provider for review/approval because:  Provider's preference.

## 2022-03-24 DIAGNOSIS — C73 PAPILLARY THYROID CARCINOMA (H): ICD-10-CM

## 2022-03-24 DIAGNOSIS — E89.0 POSTSURGICAL HYPOTHYROIDISM: ICD-10-CM

## 2022-03-24 DIAGNOSIS — E89.2 POST-SURGICAL HYPOPARATHYROIDISM (H): Primary | ICD-10-CM

## 2022-04-09 ENCOUNTER — HEALTH MAINTENANCE LETTER (OUTPATIENT)
Age: 57
End: 2022-04-09

## 2022-04-20 ENCOUNTER — LAB (OUTPATIENT)
Dept: LAB | Facility: CLINIC | Age: 57
End: 2022-04-20
Payer: COMMERCIAL

## 2022-04-20 DIAGNOSIS — E89.0 POSTSURGICAL HYPOTHYROIDISM: ICD-10-CM

## 2022-04-20 DIAGNOSIS — C73 PAPILLARY THYROID CARCINOMA (H): ICD-10-CM

## 2022-04-20 DIAGNOSIS — E89.2 POST-SURGICAL HYPOPARATHYROIDISM (H): ICD-10-CM

## 2022-04-20 LAB
CALCIUM SERPL-MCNC: 8.5 MG/DL (ref 8.5–10.1)
CREAT SERPL-MCNC: 1.13 MG/DL (ref 0.52–1.04)
GFR SERPL CREATININE-BSD FRML MDRD: 58 ML/MIN/1.73M2
PHOSPHATE SERPL-MCNC: 4.5 MG/DL (ref 2.5–4.5)
T4 FREE SERPL-MCNC: 1.62 NG/DL (ref 0.76–1.46)
TSH SERPL DL<=0.005 MIU/L-ACNC: <0.01 MU/L (ref 0.4–4)

## 2022-04-20 PROCEDURE — 36415 COLL VENOUS BLD VENIPUNCTURE: CPT

## 2022-04-20 PROCEDURE — 99000 SPECIMEN HANDLING OFFICE-LAB: CPT

## 2022-04-20 PROCEDURE — 82565 ASSAY OF CREATININE: CPT

## 2022-04-20 PROCEDURE — 84439 ASSAY OF FREE THYROXINE: CPT

## 2022-04-20 PROCEDURE — 86800 THYROGLOBULIN ANTIBODY: CPT | Mod: 90

## 2022-04-20 PROCEDURE — 84100 ASSAY OF PHOSPHORUS: CPT

## 2022-04-20 PROCEDURE — 82310 ASSAY OF CALCIUM: CPT

## 2022-04-20 PROCEDURE — 84432 ASSAY OF THYROGLOBULIN: CPT | Mod: 90

## 2022-04-20 PROCEDURE — 84443 ASSAY THYROID STIM HORMONE: CPT

## 2022-04-25 LAB — SCANNED LAB RESULT: NORMAL

## 2022-04-29 DIAGNOSIS — E89.0 POSTSURGICAL HYPOTHYROIDISM: ICD-10-CM

## 2022-04-29 DIAGNOSIS — E89.2 POST-SURGICAL HYPOPARATHYROIDISM (H): Chronic | ICD-10-CM

## 2022-05-03 RX ORDER — CALCITRIOL 0.5 UG/1
0.5 CAPSULE, LIQUID FILLED ORAL DAILY
Qty: 90 CAPSULE | Refills: 4 | Status: SHIPPED | OUTPATIENT
Start: 2022-05-03 | End: 2023-05-08

## 2022-05-03 RX ORDER — LEVOTHYROXINE SODIUM 125 MCG
125 TABLET ORAL DAILY
Qty: 90 TABLET | Refills: 4 | Status: SHIPPED | OUTPATIENT
Start: 2022-05-03 | End: 2022-09-20

## 2022-05-03 NOTE — TELEPHONE ENCOUNTER
CALCITRIOL 0.5MCG CAPS    Last Written Prescription Date:  2/18/21  Last Fill Quantity: 90,   # refills: 4  Last Office Visit : 2/23/21  Future Office visit:  11/21/22    Routing refill request to provider for review/approval because:  Drug not on the Endocrine refill protocol     SYNTHROID 125MCG TABS    Last Written Prescription Date:  2/23/21  Last Fill Quantity: 90,   # refills: 4      Routing refill request to provider for review/approval because:  Provider request  Thank-you, Vivienne BUTT RN Medication Refill Team

## 2022-06-30 NOTE — TELEPHONE ENCOUNTER
Goal Outcome Evaluation:   Pt extubated at 1455. Now on 4L NC sating 100%.  Pt denies pain, nausea or SOB.  A&Ox4.  No anesthetic toxicity complaints.  FT remains gastric, radiology consulted to come to bedside to advance tube post pyloric.  CT output decreasing, much less sanguinous this shift.  UO has dropped to 20-25cc/hour. Last CVP 7, patient having no hypotensive episodes with activity/position changes this shift.  Aggressive pulmonary toilet and increased activity as tolerated.    Daughter at bedside                       Reason for Call:  Work in Appointment before 12.10, Requested Provider:  Jenna Rey M.D.    PCP: Jenna Rey    Reason for visit: Follow up hospital for Cardiac symptoms, was told she needs cholesterol and thyroid labs checked again by primary.      Duration of symptoms: In hospital 11.29-12.1    Have you been treated for this in the past? No    Additional comments:     Can we leave a detailed message on this number? YES    Phone number patient can be reached at: Home number on file [phone] (home)    Best Time: any    Call taken on 12/3/2018 at 1:22 PM by Marjorie Cee

## 2022-08-03 DIAGNOSIS — M72.2 PLANTAR FASCIITIS, RIGHT: ICD-10-CM

## 2022-08-03 DIAGNOSIS — M72.2 PLANTAR FASCIITIS OF LEFT FOOT: ICD-10-CM

## 2022-08-03 DIAGNOSIS — M76.72 PERONEAL TENDINITIS, LEFT: ICD-10-CM

## 2022-08-03 NOTE — TELEPHONE ENCOUNTER
Patient has not seen me in almost 2 years.  She should have a follow-up appointment before further refills if she is still having pain.  We could refill this for 1 month but also follow-up in clinic.  Her labs should be evaluated as well to determine safety of this medication.     Ps -her preferred pharmacy is not listed on this prescription.

## 2022-08-03 NOTE — TELEPHONE ENCOUNTER
Pending Prescriptions:                       Disp   Refills    diclofenac (VOLTAREN) 75 MG EC tablet      60 tab*1        Sig: Take 1 tablet (75 mg) by mouth 2 times daily    Routing refill request to provider for review/approval because:  Labs not current:  ALT, AST, CBC  BP failed RN Protocol    Christie Bobo RN

## 2022-08-04 RX ORDER — DICLOFENAC SODIUM 75 MG/1
75 TABLET, DELAYED RELEASE ORAL 2 TIMES DAILY
Qty: 60 TABLET | Refills: 0 | Status: SHIPPED | OUTPATIENT
Start: 2022-08-04 | End: 2023-06-09

## 2022-08-04 NOTE — TELEPHONE ENCOUNTER
Patient notified of below, patient is at work but will call for appt. 1 month supply sent to pharm per Dr. Baig's message below.    Nery Zaragoza RN on 8/4/2022 at 2:43 PM

## 2022-08-16 ENCOUNTER — OFFICE VISIT (OUTPATIENT)
Dept: PODIATRY | Facility: CLINIC | Age: 57
End: 2022-08-16
Payer: COMMERCIAL

## 2022-08-16 VITALS
HEIGHT: 65 IN | WEIGHT: 192 LBS | BODY MASS INDEX: 31.99 KG/M2 | SYSTOLIC BLOOD PRESSURE: 118 MMHG | DIASTOLIC BLOOD PRESSURE: 72 MMHG

## 2022-08-16 DIAGNOSIS — M72.2 PLANTAR FASCIITIS, RIGHT: Primary | ICD-10-CM

## 2022-08-16 PROCEDURE — 99213 OFFICE O/P EST LOW 20 MIN: CPT | Performed by: PODIATRIST

## 2022-08-16 RX ORDER — DICLOFENAC SODIUM 75 MG/1
75 TABLET, DELAYED RELEASE ORAL 2 TIMES DAILY
Qty: 60 TABLET | Refills: 1 | Status: SHIPPED | OUTPATIENT
Start: 2022-08-16 | End: 2023-06-09

## 2022-08-16 ASSESSMENT — PAIN SCALES - GENERAL: PAINLEVEL: MILD PAIN (3)

## 2022-08-16 NOTE — LETTER
"    8/16/2022         RE: Radha Mcdermott  [address] [facility] [id number]        Dear Colleague,    Thank you for referring your patient, Radha Mcdermott, to the [facility]. Please see a copy of my visit note below.    Chief Complaint   Patient presents with     Consult     Twisted Left foot pain, onset 8/13/2022, presents with medial left foot/heel pain that is improving; new issue     RECHECK     Right plantar fasciitis, Left peroneal tendinitis, onset early Aug 2020; LOV 10/12/2020     Other     Request new orthotic order     HPI:    Works as a [employer].    Since last follow-up patient has been doing quite well but still utilizes Voltaren as needed.  She notes that some months she may go an entire month without utilizing it but other times she may use it a couple times a week with changes in activity or after wearing may be less supportive shoe gear.  Describes some plantar arch lateral arch pain worse with activity or unplanned activity or less support.     EXAM:Vitals: /72 (BP Location: Left arm, Patient Position: Sitting, Cuff Size: Adult Regular)   Ht 1.645 m (5' 4.76\")   Wt 87.1 kg (192 lb)   BMI 32.18 kg/m    BMI= Body mass index is 32.18 kg/m .    General appearance: Patient is alert and fully cooperative with history & exam.  No sign of distress is noted during the visit.     Psychiatric: Affect is pleasant & appropriate.  Patient appears motivated to improve health.     Respiratory: Breathing is regular & unlabored while sitting.     HEENT: Hearing is intact to spoken word.  Speech is clear.  No gross evidence of visual impairment that would impact ambulation.     Vascular: DP & PT pulses are intact & regular bilaterally.  No significant edema or varicosities noted.  CFT and skin temperature is normal to both lower extremities.     Neurologic: Lower extremity sensation is intact to light touch.  No evidence of weakness or " contracture in the lower extremities.  No evidence of neuropathy.    Dermatologic: Skin is intact to both lower extremities with adequate texture, turgor and tone about the integument.  No paronychia or evidence of soft tissue infection is noted.     Musculoskeletal: Patient is ambulatory without assistive device or brace.  Generalized valgus noted bilateral.  Much reduced area of discomfort at the fifth metatarsal base not proximal to this but rather at the base itself.  Manual muscle strength +5/5 to all 4 quadrants.  No palpable edema erythema or heat.  Right foot minimal discomfort is noted along the medial band of the plantar fascia.   No erythema or heat.  No pain with compression of the calcaneus medial to lateral.  No pain on the right foot with palpation of the Achilles peroneal or posterior tibial tendon.  Patient is obese    Radiographs: 3 views bilateral demonstrate no acute cortical reaction.  No joint diastases.  Mild pes valgus noted bilateral.       ASSESSMENT:       ICD-10-CM    1. Plantar fasciitis, right  M72.2 diclofenac (VOLTAREN) 75 MG EC tablet        PLAN:  Reviewed patient's chart in [facility].      9/10/2020   Obtained and interpreted radiographs today  She has mild peroneal insertional tendinitis on the left foot.  Also plantar fasciitis symptoms along the medial band of the plantar fascia through the central arch on the right foot.  No palpable rupture or visible or palpable edema.  Recommended very strict utilization of custom molded orthotics no barefoot walking.  Patient does demonstrate flip-flop 10 so I suspect much of this comes from not supporting her feet adequately.  Recommended minimizing changes in shoe gear.  Begin oral anti-inflammatory Voltaren Rx prescribed today  Begin physical therapy as this was helpful in the past.    10/12/2020  Patient's right foot plantar fasciitis is improved considerably in her left foot insertional peroneal brevis tendinitis is also improved.   Orthotics conform well and her shoes are appropriate  Recommend continued stretching and utilize the night splint all night every night.  Refilled Voltaren.  Follow-up in 1 month.  May discontinue Voltaren when symptoms are resolved continue night splint until symptoms are resolved x3 or 4 weeks.    May provide a second night splint for her if she requests.    8/16/2022  Patient has been doing quite well with minimal foot pain but occasionally still utilizes oral Voltaren and would like a refill of this.  This was refilled again today that we reviewed risk of potential renal disease with utilizing chronic nonsteroidal anti-inflammatories.  Recommended attempting to utilize OTC anti-inflammatories instead of Rx.  Also recommended she follow-up with her primary care provider if she is going to utilize chronic nonsteroidal anti-inflammatory so that her renal function can be managed over time.    Recommend stiff sturdy shoes and custom molded orthotics to reduce the need for utilizing oral anti-inflammatories.  She notes and when she works on the ladder she has more discomfort in her feet therefore recommended utilizing a work boot with her custom molded orthotic with a steel shank to reduce the need for overuse of the foot and use of nonsteroidal anti-inflammatories.  Discussed managing activities as well to reduce the need of oral anti-inflammatories.    All questions were answered.  Follow-up as needed      Juanpablo Baig DPM          Again, thank you for allowing me to participate in the care of your patient.        Sincerely,        Juanpablo Baig DPM

## 2022-08-16 NOTE — PROGRESS NOTES
"Chief Complaint   Patient presents with     Consult     Twisted Left foot pain, onset 8/13/2022, presents with medial left foot/heel pain that is improving; new issue     RECHECK     Right plantar fasciitis, Left peroneal tendinitis, onset early Aug 2020; LOV 10/12/2020     Other     Request new orthotic order     HPI:    Works as a [employer].    Since last follow-up patient has been doing quite well but still utilizes Voltaren as needed.  She notes that some months she may go an entire month without utilizing it but other times she may use it a couple times a week with changes in activity or after wearing may be less supportive shoe gear.  Describes some plantar arch lateral arch pain worse with activity or unplanned activity or less support.     EXAM:Vitals: /72 (BP Location: Left arm, Patient Position: Sitting, Cuff Size: Adult Regular)   Ht 1.645 m (5' 4.76\")   Wt 87.1 kg (192 lb)   BMI 32.18 kg/m    BMI= Body mass index is 32.18 kg/m .    General appearance: Patient is alert and fully cooperative with history & exam.  No sign of distress is noted during the visit.     Psychiatric: Affect is pleasant & appropriate.  Patient appears motivated to improve health.     Respiratory: Breathing is regular & unlabored while sitting.     HEENT: Hearing is intact to spoken word.  Speech is clear.  No gross evidence of visual impairment that would impact ambulation.     Vascular: DP & PT pulses are intact & regular bilaterally.  No significant edema or varicosities noted.  CFT and skin temperature is normal to both lower extremities.     Neurologic: Lower extremity sensation is intact to light touch.  No evidence of weakness or contracture in the lower extremities.  No evidence of neuropathy.    Dermatologic: Skin is intact to both lower extremities with adequate texture, turgor and tone about the integument.  No paronychia or evidence of soft tissue infection is noted. "     Musculoskeletal: Patient is ambulatory without assistive device or brace.  Generalized valgus noted bilateral.  Much reduced area of discomfort at the fifth metatarsal base not proximal to this but rather at the base itself.  Manual muscle strength +5/5 to all 4 quadrants.  No palpable edema erythema or heat.  Right foot minimal discomfort is noted along the medial band of the plantar fascia.   No erythema or heat.  No pain with compression of the calcaneus medial to lateral.  No pain on the right foot with palpation of the Achilles peroneal or posterior tibial tendon.  Patient is obese    Radiographs: 3 views bilateral demonstrate no acute cortical reaction.  No joint diastases.  Mild pes valgus noted bilateral.       ASSESSMENT:       ICD-10-CM    1. Plantar fasciitis, right  M72.2 diclofenac (VOLTAREN) 75 MG EC tablet        PLAN:  Reviewed patient's chart in [facility].      9/10/2020   Obtained and interpreted radiographs today  She has mild peroneal insertional tendinitis on the left foot.  Also plantar fasciitis symptoms along the medial band of the plantar fascia through the central arch on the right foot.  No palpable rupture or visible or palpable edema.  Recommended very strict utilization of custom molded orthotics no barefoot walking.  Patient does demonstrate flip-flop 10 so I suspect much of this comes from not supporting her feet adequately.  Recommended minimizing changes in shoe gear.  Begin oral anti-inflammatory Voltaren Rx prescribed today  Begin physical therapy as this was helpful in the past.    10/12/2020  Patient's right foot plantar fasciitis is improved considerably in her left foot insertional peroneal brevis tendinitis is also improved.  Orthotics conform well and her shoes are appropriate  Recommend continued stretching and utilize the night splint all night every night.  Refilled Voltaren.  Follow-up in 1 month.  May discontinue Voltaren when symptoms are resolved continue night splint  until symptoms are resolved x3 or 4 weeks.    May provide a second night splint for her if she requests.    8/16/2022  Patient has been doing quite well with minimal foot pain but occasionally still utilizes oral Voltaren and would like a refill of this.  This was refilled again today that we reviewed risk of potential renal disease with utilizing chronic nonsteroidal anti-inflammatories.  Recommended attempting to utilize OTC anti-inflammatories instead of Rx.  Also recommended she follow-up with her primary care provider if she is going to utilize chronic nonsteroidal anti-inflammatory so that her renal function can be managed over time.    Recommend stiff sturdy shoes and custom molded orthotics to reduce the need for utilizing oral anti-inflammatories.  She notes and when she works on the ladder she has more discomfort in her feet therefore recommended utilizing a work boot with her custom molded orthotic with a steel shank to reduce the need for overuse of the foot and use of nonsteroidal anti-inflammatories.  Discussed managing activities as well to reduce the need of oral anti-inflammatories.    All questions were answered.  Follow-up as needed      Juanpablo Baig DPM

## 2022-08-19 ENCOUNTER — HOSPITAL ENCOUNTER (OUTPATIENT)
Dept: MAMMOGRAPHY | Facility: CLINIC | Age: 57
Discharge: HOME | End: 2022-08-19
Attending: FAMILY MEDICINE | Admitting: FAMILY MEDICINE
Payer: COMMERCIAL

## 2022-08-19 DIAGNOSIS — Z12.31 VISIT FOR SCREENING MAMMOGRAM: ICD-10-CM

## 2022-08-19 PROCEDURE — 77067 SCR MAMMO BI INCL CAD: CPT

## 2022-08-29 ENCOUNTER — HOSPITAL ENCOUNTER (OUTPATIENT)
Dept: ULTRASOUND IMAGING | Facility: CLINIC | Age: 57
Discharge: HOME | End: 2022-08-29
Attending: FAMILY MEDICINE
Payer: COMMERCIAL

## 2022-08-29 ENCOUNTER — HOSPITAL ENCOUNTER (OUTPATIENT)
Dept: MAMMOGRAPHY | Facility: CLINIC | Age: 57
Discharge: HOME | End: 2022-08-29
Attending: FAMILY MEDICINE
Payer: COMMERCIAL

## 2022-08-29 DIAGNOSIS — R92.8 ABNORMAL MAMMOGRAM: ICD-10-CM

## 2022-08-29 PROCEDURE — 77061 BREAST TOMOSYNTHESIS UNI: CPT | Mod: LT

## 2022-08-29 PROCEDURE — 76642 ULTRASOUND BREAST LIMITED: CPT | Mod: LT

## 2022-09-14 ENCOUNTER — ANCILLARY PROCEDURE (OUTPATIENT)
Dept: MAMMOGRAPHY | Facility: CLINIC | Age: 57
End: 2022-09-14
Attending: FAMILY MEDICINE
Payer: COMMERCIAL

## 2022-09-14 ENCOUNTER — ANCILLARY PROCEDURE (OUTPATIENT)
Dept: ULTRASOUND IMAGING | Facility: CLINIC | Age: 57
End: 2022-09-14
Attending: FAMILY MEDICINE
Payer: COMMERCIAL

## 2022-09-14 DIAGNOSIS — R92.0 MICROCALCIFICATION OF LEFT BREAST ON MAMMOGRAM: ICD-10-CM

## 2022-09-14 PROCEDURE — 88305 TISSUE EXAM BY PATHOLOGIST: CPT | Mod: GC | Performed by: PATHOLOGY

## 2022-09-14 PROCEDURE — 76642 ULTRASOUND BREAST LIMITED: CPT | Mod: RT

## 2022-09-14 PROCEDURE — 19081 BX BREAST 1ST LESION STRTCTC: CPT | Mod: LT | Performed by: STUDENT IN AN ORGANIZED HEALTH CARE EDUCATION/TRAINING PROGRAM

## 2022-09-15 ENCOUNTER — TELEPHONE (OUTPATIENT)
Dept: GENERAL RADIOLOGY | Facility: CLINIC | Age: 57
End: 2022-09-15

## 2022-09-15 LAB
PATH REPORT.COMMENTS IMP SPEC: NORMAL
PATH REPORT.COMMENTS IMP SPEC: NORMAL
PATH REPORT.FINAL DX SPEC: NORMAL
PATH REPORT.GROSS SPEC: NORMAL
PATH REPORT.MICROSCOPIC SPEC OTHER STN: NORMAL
PATH REPORT.RELEVANT HX SPEC: NORMAL
PHOTO IMAGE: NORMAL

## 2022-09-15 NOTE — TELEPHONE ENCOUNTER
Spoke to patient regarding Left breast biopsy done on 9/14/22 with finding of Benign breast tissue with columnar cell changes and fibrocystic changes including microcysts-Calcifications associated with benign epithelium. Notified patient that the Radiologist recommendation is annual Mammography. Patient verbalized understanding and all questions and concerns answered to patients satisfaction.

## 2022-09-20 ENCOUNTER — VIRTUAL VISIT (OUTPATIENT)
Dept: ENDOCRINOLOGY | Facility: CLINIC | Age: 57
End: 2022-09-20
Payer: COMMERCIAL

## 2022-09-20 DIAGNOSIS — E89.2 POST-SURGICAL HYPOPARATHYROIDISM (H): ICD-10-CM

## 2022-09-20 DIAGNOSIS — E89.0 POSTSURGICAL HYPOTHYROIDISM: ICD-10-CM

## 2022-09-20 DIAGNOSIS — C73 PAPILLARY THYROID CARCINOMA (H): Primary | ICD-10-CM

## 2022-09-20 PROCEDURE — 99214 OFFICE O/P EST MOD 30 MIN: CPT | Mod: GC

## 2022-09-20 RX ORDER — LEVOTHYROXINE SODIUM 125 MCG
TABLET ORAL
Qty: 90 TABLET | Refills: 4 | Status: SHIPPED | OUTPATIENT
Start: 2022-09-20 | End: 2023-05-08

## 2022-09-20 NOTE — PATIENT INSTRUCTIONS
Reduce levothyroxine to   I recommend you reduce the levothyroxine by 1/2 tablet per week, using the x mcg tablets as follows:  MON to SAT 1 tablet/day;  SUN 0.5 tablet    Labs every 6 months -next in October, 2022    Next neck  2025    See me approximately yearly

## 2022-09-20 NOTE — PROGRESS NOTES
Endocrinology Fellow note    Chief complaint:  Radha is a 57 year old female seen for follow up of papillary thyroid carcinoma.     ASSESSMENT/PLAN:     # Papillary Thyroid Carcinoma pT4a(5cm, ETE), N1, M0, stage Stage III, JUAN DIEGO intermediate risk for recurrence s/p total thyroidectomy 2005 + left neck dissection x3 & I-131. Excellent biochemical (Thgb <0.1 04/2022) and structural response (US 11/2020 not concerning).   - TSH, FT4, thyroglobulin yearly standing orders (next due in 04/2023)  - Repeat thyroid ultrasound expected in 11/2025    # Postoperative Hypothyroidism   Goal TSH < 0.4, TSH at goal in 04/2022 (TSH<0.01). Asymptomatic. Given she is postmenopausal; will slightly decrease dose for bone protection.   - Decrease Levothyroxine 125 mcg daily x6 days and half tablet x1 day (116 mcg daily).    # Postoperative Hypoparathyroidism    Is on calcitriol and magnesium (unable to tolerate calcium). Has had suboptimal control with decrease to 0.25 mcg dose in the past, doing well on current regimen.  - ca,phos, cr labs q 6 months  -Calcitriol 0.5 mcg daily   -Continue magnesium 200 mg BID     Shivam Macario MD  Endocrinology Fellow, PGY V    Due to the COVID 19 pandemic this visit was a telephone /video visit in order to help prevent spread of infection in this high risk patient and the general population. The patient gave verbal consent for the visit today.    Platform Used: [email or web address]  Start time 3:38 PM  Stop time 3:54 PM  Total time 16 minutes.      HISTORY OF PRESENT ILLNESS    Ms Mcdermott is a 56 yo F with a past medical history of papillary thyroid carcinoma.     Thyroid Cancer Treatment History:  Thyroid cancer has been treated with 3 operations  10/7/05   Thyroidectomy removing  left sided PCT 5.0 x 3.0 x 2.8 cm, extending through the capsule into parathyroidal soft tissues.  There was - Microscopic foci of papillary carcinoma in the right lobe and one LN removed was positive for pCT.  Her post operative course was   complicated by hypoparathyroidism.   132 mCi 131I 11/05.     12/28/06   Left modified neck dissection.    10/22/09 selective neck dissection to include level 7 and level 4 on the left.  Surgical pathology was positive for metastatic PCT in lymph nodes        We have the following tumor marker data:  9/9/05: Tg 290, JUAN DIEGO < 1, TSH   Operation # 1  10/28/05: Tg 131, JUAN DIEGO < 1, TSH 15.12  11/28/05: Tg 116, JUAN DIEGO <1, ,7  2/3/06: Tg 26.7, JUAN DIEGO < 1, TSH 14.43  6/2/06: Tg 2.2, JUAN DIEGO < 1, TSH 0.36  11/3/06: Tg 18, JUAN DIEGO < 1  Operation # 2  1/30/07: Tg 1.1, JUAN DIEGO < 1, TSH   4/17/07: Tg 2, JUAN DIEGO < 1, TSH   12/11/07: Tg 13.9, JUAN DIEGO < 1, TSH 42.02  1/8/08: Tg 2.7, JUAN DIEGO < 1, TSH   3/3/09: Tg 1.0, JUAN DIEGO < 0.4, TSH   4/24/09: Tg 4667, JUAN DIEGO < 0.4  Operation # 3  1/6/10: Tg 0.12, JUAN DIEGO < 0.4, TSH < 0.03  10/17/11: Tg 0.3, JUAN DIEGO < 20,   1/20/12: Tg 3.1, JUAN DIEGO < 20  3/21/12: Tg 0.1, JUAN DIEGO < 20, TSH 0.05  4/26/12: Tg 0.1, JUAN DIEGO < 20, TSH 0.02  10/26/12: Tg 3.5, JUAN DIEGO < 20, TSH 0.02 TBS -- supposedly thyrogen was given but the results and dates don't quite fit  4/26/13: Tg 0.2, JUAN DIEGO < 20, TSH 0.01  11/8/13: Tg 3.3, JUAN DIEGO < 20 TBS negative  2/10/14: Tg 0.1, JUAN DIEGO < 20  8/18/14: Tg 0.1, JUAN DIEGO < 20, TSH 0.00  11/21/14: Tg 4 ng/ml (athyrotic < 0.1, intact < 33 [facility]) JUAN DIEGO < 1.8,TSH 0.01  12/30/14: TSH 0.01  3/4/15; Tg 0.19, JUAN DIEGO < 0.4, TSH  <0.02 ; concurrent remeasure of 1/6/10 Tg 0.15  9/2/15: Tg 0.11, JUAN DIEGO < 0.4, TSH 0.01  5/26/16: Tg 0.27, JUAN DIEGO < 0.4, TSH 0.55  12/13/16: Tg 0.14, JUAN DIEGO < 0.4, TSH 0.07, free T4 1.32  6/13/17: Tg 0.17, JUAN DIEGO < 0.4, TSH 0.11-   9/24/18: Tg 0.18, JUAN DIEGO < 0.4, TSH 0.14 , free T 4 1.22,   10/10/18 24 hour urine calcium 80 mg/24 hours  11/30/18 TSH 7.49 - seen by me today for lst time.   2/4/2020: Tg < 0.1, JUAN DIEGO < 0.4, TSH 0.38, free T4 1.52, Ca 8.4, phos 3.6, creatinine 1.09, K 3.5-   10/28/2020 Ca 9, phos 4.6, creatinine 1.01, K 4.2,   04/2022 Thg <0.1, Ab <0.4, TSH <0.01, FT4 1.62      She endorses doing well overall. Denies any  significant symptoms. Tolerating levothyroxine dose well.     REVIEW OF SYSTEMS  No numbness, tingling or muscle cramps.   Knee pain and ankle pain.  Intermittent palpitations at times.  Denies tremors, or heat intolerance.   No fractures. Is postmenopausal.   Weight has remained stable ; is at ~189 lbs consistently.     Past Medical History  Past Medical History:   Diagnosis Date     Abnormal Papanicolaou smear of cervix and cervical HPV 12/03    ASCUS - repeat paps all ok.      Depressive disorder, not elsewhere classified      Dysmenorrhea      Fracture of arm age 14    right     Gestational diabetes      Papillary thyroid carcinoma (H) 10/05    5 cm, MF, + ETE, node+; 3 operations, 131I     Postsurgical hypoparathyroidism (H) 2005     Postsurgical hypothyroidism 2005     Unspecified contraceptive management        Medications  Current Outpatient Medications   Medication     calcitRIOL (ROCALTROL) 0.5 MCG capsule     diclofenac (VOLTAREN) 75 MG EC tablet     diclofenac (VOLTAREN) 75 MG EC tablet     diclofenac (VOLTAREN) 75 MG EC tablet     docusate sodium 100 MG tablet     EPINEPHrine (EPIPEN) 0.3 MG/0.3ML injection     magnesium oxide (MAG-OX) 400 (241.3 Mg) MG tablet     magnesium oxide (MAG-OX) 400 MG tablet     SYNTHROID 125 MCG tablet     No current facility-administered medications for this visit.       Current Outpatient Medications   Medication Sig Dispense Refill     calcitRIOL (ROCALTROL) 0.5 MCG capsule Take 1 capsule (0.5 mcg) by mouth daily 90 capsule 4     diclofenac (VOLTAREN) 75 MG EC tablet Take 1 tablet (75 mg) by mouth 2 times daily 60 tablet 1     diclofenac (VOLTAREN) 75 MG EC tablet Take 1 tablet (75 mg) by mouth 2 times daily (Patient not taking: Reported on 8/16/2022) 60 tablet 0     diclofenac (VOLTAREN) 75 MG EC tablet Take 1 tablet (75 mg) by mouth 2 times daily (Patient not taking: Reported on 8/16/2022) 60 tablet 1     docusate sodium 100 MG tablet Take 100 mg by mouth 2 times daily  as needed for constipation (Patient not taking: Reported on 2022) 60 tablet 11     EPINEPHrine (EPIPEN) 0.3 MG/0.3ML injection Inject 0.3 mg into the muscle once as needed for anaphylaxis (Patient not taking: Reported on 2022)       magnesium oxide (MAG-OX) 400 (241.3 Mg) MG tablet TAKE ONE-HALF TABLET BY MOUTH TWICE A DAY 90 tablet 4     magnesium oxide (MAG-OX) 400 MG tablet Take 0.5 tablets (200 mg) by mouth 2 times daily 90 tablet 4     SYNTHROID 125 MCG tablet Take 1 tablet (125 mcg) by mouth daily Separate iron or calcium-containing products and levothyroxine by at least 4 hours. 90 tablet 4       Allergies  Allergies   Allergen Reactions     Bees      Chicken Allergy Swelling     Meat Extract Swelling     No Known Drug Allergies          Family History  family history includes Allergies in her son; Alzheimer Disease in her paternal grandmother; Cancer in her father, maternal aunt, and paternal grandfather; Cerebrovascular Disease in her paternal grandmother; Dementia in her father; Diabetes in her maternal grandmother; Heart Disease in her paternal grandmother; Lipids in her mother; Osteoporosis in her mother; Thyroid Disease in her maternal grandmother and paternal grandmother.    Social History  Social History     Tobacco Use     Smoking status: Former Smoker     Packs/day: 0.50     Years: 15.00     Pack years: 7.50     Quit date: 3/1/1998     Years since quittin.5     Smokeless tobacco: Never Used     Tobacco comment: no smoking in the home   Substance Use Topics     Alcohol use: Yes     Comment: social     Drug use: No       Physical Exam  There were no vitals taken for this visit.  There is no height or weight on file to calculate BMI.  GENERAL :  In no apparent distress  SKIN: No visible rash. Visible skin clear  EYES: No scleral icterus,  No proptosis, conjunctival redness, stare, retraction  NECK: No visible masses or goiter.   RESP: No audible cough, normal work of breathing   NEURO:  awake, alert, responds appropriately to questions.     DATA REVIEW  ENDO THYROID LABS-[facility] TSH   Latest Ref Rng & Units 0.40 - 4.00 mU/L   4/20/2022 <0.01 (L)   2/4/2020 0.38 (L)   11/30/2018 7.49 (H)   9/24/2018 0.14 (L)   6/13/2017 0.11 (L)   12/13/2016 0.07 (L)   5/26/2016 0.55   9/2/2015 0.01 (L)   5/20/2015 <0.01 (L)     ENDO THYROID LABS-[facility] FREE T4   Latest Ref Rng & Units 0.76 - 1.46 ng/dL   4/20/2022 1.62 (H)   2/4/2020 1.52 (H)   11/30/2018 0.85   9/24/2018 1.22   6/13/2017 1.06   12/13/2016 1.32   5/26/2016 1.03   9/2/2015 1.38   5/20/2015 1.39       Past imaging on PACs.    3/4/09 neck US: left level 7 lymph node, ? left level 4  5/27/09 PET/CT negative to my eye  10/2021 Calcium 8.5, phos 4.5, Cr 1.13, eGFR 58 (04/2022)     Outside imaging reports:  10/26/12: 4 mCi 131I TBS negative  11/6/13: 4 mCi 131I TBS: negative  12/23/14: PET CT without contrast: focal left ovarian avidity of uncertain significance (also seen on outside PET 2010)-- t  6/7/16 US neck negative  6/7/16 CT chest: scattered subcm nodules  3/13/19 chest CT: stable to 2016 11/6/2020: US Head and Neck - negative.

## 2022-09-20 NOTE — PROGRESS NOTES
Radha Mcdermott  is being evaluated via a billable video visit.      How would you like to obtain your AVS? Sent[employer]o  For the video visit, send the invitation by: Text to cell phone: [phone]  Will anyone else be joining your video visit? No

## 2022-09-20 NOTE — PROGRESS NOTES
Attending tie-in statement: Patient seen and examined by me, discussed with Dr Macario  fellow whose note I have reviewed and with which I agree.  Key elements and diagnostic points include the followin.  Papillary carcinoma thyroid, multifocal, bilateral,  largest tumor left 5 cm, + ETE, multiple node positive. She been treated with total thyroidectomy and left neck dissection (in 3 operations) and also 132 mCi 131.  -   Labs: Tg, TSH, Free T4  Yearly standing orders  See me yearly   Neck US     Hypothyroidism due to thyroidectomy. As per # 1.  Treat to target TSH < 0.4.  Synthroid 125 mcg/day.  Reduce to 125 x 6.5/week    Addendum  22 Tg 0.1, JUAN DIEGO < 0.4, TSH 0.02, free T4 1.56, Ca 8, phos 3.7, creatinine 0.95      surgical hypoparathyroidism (partial) since first operation -  On calcitriol 0.5 mcg/day.  She is not taking calcium and she is intolerant of dairy; she takes Mg. In the past she had symptoms on calcitriol 0.25 mcg/day.    Standing orders for labs every 6 months.    pulmonary nodules, subcm, stable on serial imaging, most recent 3/19. I am noting this as it still may be relevant to # 1    Due to the COVID 19 pandemic this visit was a video visit. The patient gave verbal consent for the visit today.    I have independently reviewed and interpreted labs, imaging as indicated.     Chart review/prep time 1  [id number]  Visit Start time 1539  Visit Stop time 1553  _38_ minutes spent on the date of the encounter doing chart review, history and exam, documentation and further activities as noted above.    Vero Arevalo MD  Endocrinology & Diabetes.      Cc/ HPI  57  year old seen for post surgical hypothyroidism, post surgical hypoparathyroidism and Papillary thyroid cancer.   She was last seen by me 2021    Thyroid cancer has been treated with 3 operations  10/7/05   Thyroidectomy - left sided PCT 5.0 x 3.0 x 2.8 cm, extending through the capsule into parathyroidal soft tissues.  -  Microscopic  papillary carcinoma in the right lobe and one LN removed was positive for pCT.  Her post operative course was  complicated by hypoparathyroidism.   132 mCi 131I 11/05.     12/28/06   Left modified neck dissection.  +6/11 LN  10/22/09 selective neck dissection to include level 7 and level 4 on the left.  + metastatic PCT in 4/4  lymph nodes     We have the following tumor marker data:  9/9/05: Tg 290, JUAN DIEGO < 1, TSH   Operation # 1  10/28/05: Tg 131, JUAN DIEGO < 1, TSH 15.12  11/28/05: Tg 116, JUAN DIEGO <1, ,7  2/3/06: Tg 26.7, JUAN DIEGO < 1, TSH 14.43  6/2/06: Tg 2.2, JUAN DIEGO < 1, TSH 0.36  11/3/06: Tg 18, JUAN DIEGO < 1  Operation # 2  1/30/07: Tg 1.1, JUAN DIGEO < 1, TSH   4/17/07: Tg 2, JUAN DIEGO < 1, TSH   12/11/07: Tg 13.9, JUAN DIEGO < 1, TSH 42.02  1/8/08: Tg 2.7, JUAN DIEGO < 1, TSH   3/3/09: Tg 1.0, JUAN DIEGO < 0.4, TSH   4/24/09: LN FNAB needle wash  Tg 4667, JUAN DIEGO < 0.4  Operation # 3  1/6/10: Tg 0.12, JUAN DIEGO < 0.4, TSH < 0.03  10/17/11: Tg 0.3, JUAN DIEGO < 20,   1/20/12: Tg 3.1, JUAN DIEGO < 20  3/21/12: Tg 0.1, JUAN DIEGO < 20, TSH 0.05  4/26/12: Tg 0.1, JUAN DIEGO < 20, TSH 0.02  10/26/12: Tg 3.5, JUAN DIEGO < 20, TSH 0.02 TBS -- supposedly thyrogen was given but the results and dates don't quite fit  4/26/13: Tg 0.2, JUAN DIEGO < 20, TSH 0.01  11/8/13: Tg 3.3, JUAN DIEGO < 20 TBS negative  2/10/14: Tg 0.1, JUAN DIEGO < 20  8/18/14: Tg 0.1, JUAN DIEGO < 20, TSH 0.00  11/21/14: Tg 4 ng/ml (athyrotic < 0.1, intact < 33 [facility] lab) JUAN DIEGO < 1.8,TSH 0.01  12/30/14: TSH 0.01  3/4/15; Tg 0.19, JUAN DIEGO < 0.4, TSH  <0.02 ; concurrent remeasure of 1/6/10 Tg 0.15  9/2/15: Tg 0.11, JUAN DIEGO < 0.4, TSH 0.01  5/26/16: Tg 0.27, JUAN DIEGO < 0.4, TSH 0.55  12/13/16: Tg 0.14, JUAN DIEGO < 0.4, TSH 0.07, free T4 1.32  6/13/17: Tg 0.17, JUAN DIEGO < 0.4, TSH 0.11-   9/24/18: Tg 0.18, JUAN DIEGO < 0.4, TSH 0.14 , free T 4 1.22,   10/10/18 24 hour urine calcium 80 mg/24 hours   2/4/2020: Tg < 0.1, JUAN DIEGO < 0.4, TSH 0.38, free T4 1.52, Ca 8.4, phos 3.6, creatinine 1.09, K 3.5-   10/28/2020 Ca 9, phos 4.6, creatinine 1.01, K 4.2,   4/20/2022 Tg < 0.1, JUAN DIEGO < 0.4,  TSH <  0.01, free T4 1.62, Ca 8.5, creatinine 1.13      past imaging .    3/4/09 neck US: left level 7 lymph node, ? left level 4  5/27/09 PET/CT negative to my eye  10/26/12: 4 mCi 131I TBS negative  11/6/13: 4 mCi 131I TBS: negative  12/23/14: PET CT without contrast: focal left ovarian avidity of uncertain significance (also seen on outside PET 2010)-- t  3/13/19 chest CT: stable to 2016 11/6/2020 neck US : negative.     ROS   Feels well  Weight stable ; struggling to lose weight  ? Sometimes palpitations  Joint pain - knees, ankles.   No parestheias, no cramps

## 2022-09-21 ENCOUNTER — TELEPHONE (OUTPATIENT)
Dept: ENDOCRINOLOGY | Facility: CLINIC | Age: 57
End: 2022-09-21

## 2022-10-09 ENCOUNTER — HEALTH MAINTENANCE LETTER (OUTPATIENT)
Age: 57
End: 2022-10-09

## 2022-11-28 ENCOUNTER — LAB (OUTPATIENT)
Dept: LAB | Facility: CLINIC | Age: 57
End: 2022-11-28
Payer: COMMERCIAL

## 2022-11-28 DIAGNOSIS — E89.0 POSTSURGICAL HYPOTHYROIDISM: ICD-10-CM

## 2022-11-28 DIAGNOSIS — C73 PAPILLARY THYROID CARCINOMA (H): ICD-10-CM

## 2022-11-28 DIAGNOSIS — E89.2 POST-SURGICAL HYPOPARATHYROIDISM (H): ICD-10-CM

## 2022-11-28 LAB
CALCIUM SERPL-MCNC: 8 MG/DL (ref 8.5–10.1)
CREAT SERPL-MCNC: 0.95 MG/DL (ref 0.52–1.04)
GFR SERPL CREATININE-BSD FRML MDRD: 70 ML/MIN/1.73M2
PHOSPHATE SERPL-MCNC: 3.7 MG/DL (ref 2.5–4.5)
T4 FREE SERPL-MCNC: 1.56 NG/DL (ref 0.76–1.46)
TSH SERPL DL<=0.005 MIU/L-ACNC: 0.02 MU/L (ref 0.4–4)

## 2022-11-28 PROCEDURE — 99000 SPECIMEN HANDLING OFFICE-LAB: CPT

## 2022-11-28 PROCEDURE — 84443 ASSAY THYROID STIM HORMONE: CPT

## 2022-11-28 PROCEDURE — 82310 ASSAY OF CALCIUM: CPT

## 2022-11-28 PROCEDURE — 84439 ASSAY OF FREE THYROXINE: CPT

## 2022-11-28 PROCEDURE — 84432 ASSAY OF THYROGLOBULIN: CPT | Mod: 90

## 2022-11-28 PROCEDURE — 86800 THYROGLOBULIN ANTIBODY: CPT | Mod: 90

## 2022-11-28 PROCEDURE — 82565 ASSAY OF CREATININE: CPT

## 2022-11-28 PROCEDURE — 84100 ASSAY OF PHOSPHORUS: CPT

## 2022-11-28 PROCEDURE — 36415 COLL VENOUS BLD VENIPUNCTURE: CPT

## 2022-11-29 DIAGNOSIS — E89.2 POST-SURGICAL HYPOPARATHYROIDISM (H): Primary | ICD-10-CM

## 2022-12-09 LAB — SCANNED LAB RESULT: NORMAL

## 2022-12-30 ENCOUNTER — LAB (OUTPATIENT)
Dept: LAB | Facility: CLINIC | Age: 57
End: 2022-12-30
Payer: COMMERCIAL

## 2022-12-30 DIAGNOSIS — E89.2 POST-SURGICAL HYPOPARATHYROIDISM (H): ICD-10-CM

## 2022-12-30 LAB — CALCIUM SERPL-MCNC: 8.4 MG/DL (ref 8.5–10.1)

## 2022-12-30 PROCEDURE — 82310 ASSAY OF CALCIUM: CPT

## 2022-12-30 PROCEDURE — 36415 COLL VENOUS BLD VENIPUNCTURE: CPT

## 2023-02-10 ENCOUNTER — MYC MEDICAL ADVICE (OUTPATIENT)
Dept: FAMILY MEDICINE | Facility: CLINIC | Age: 58
End: 2023-02-10
Payer: COMMERCIAL

## 2023-02-16 ENCOUNTER — TELEPHONE (OUTPATIENT)
Dept: ENDOCRINOLOGY | Facility: CLINIC | Age: 58
End: 2023-02-16
Payer: COMMERCIAL

## 2023-02-16 NOTE — TELEPHONE ENCOUNTER
Prior Authorization Specialty Medication Request    Medication/Dose: Brand name   SYNTHROID 125 MCG tablet 90 tablet 4 9/20/2022  Yes   Sig: MON to SAT 1 tablet/day; SUN 0.5 tablet   Sent to pharmacy as: Synthroid 125 MCG Oral Tablet       ICD code (if different than what is on RX):    Previously Tried and Failed:      Important Lab Values:   Rationale:  Papillary carcinoma thyroid, multifocal, bilateral,  largest tumor left 5 cm, + ETE, multiple node positive. She been treated with total thyroidectomy and left neck dissection (in 3 operations) and also 132 mCi 131.  -   Hypothyroidism due to thyroidectomy. As per # 1.  Treat to target TSH < 0.4.  Synthroid 125 mcg/day.    Insurance Name:   Insurance ID:   Insurance Phone Number:     Pharmacy Information (if different than what is on RX)  Name:    Phone:

## 2023-02-18 ENCOUNTER — HEALTH MAINTENANCE LETTER (OUTPATIENT)
Age: 58
End: 2023-02-18

## 2023-02-20 NOTE — TELEPHONE ENCOUNTER
PA Initiation    Medication: SYNTHROID 125 MCG tablet SERGIO  Insurance Company: [employer] - Phone [phone] Fax [phone]  Pharmacy Filling the Rx: [address]   Filling Pharmacy Phone: [phone]  Filling Pharmacy Fax: [phone]  Start Date: 2/20/2023

## 2023-02-21 NOTE — TELEPHONE ENCOUNTER
PRIOR AUTHORIZATION DENIED    Medication: SYNTHROID 125 MCG tablet SERGIO--DENIED    Denial Date: 2/21/2023    Denial Rational: Provider has not filed a MedWatch form with the FDA for reasons why this patient is unable to take the generic.     Appeal Information:

## 2023-02-24 NOTE — TELEPHONE ENCOUNTER
I am not aware of a reason why she can't take generic.  I believe brand Synthroid was a preference.  Please check with her to confirm this . Tell her she can continue on Synthroid and pay out of pocket (and I believe you have some special information on a cheaper way to  Get Synthroid brand which you may share with her). Or we can switch to generic.  She will need labs 2 months after switch to generic.  Let me know.   Thanks  Vero Arevalo MD

## 2023-03-15 ENCOUNTER — LAB (OUTPATIENT)
Dept: LAB | Facility: CLINIC | Age: 58
End: 2023-03-15
Payer: COMMERCIAL

## 2023-03-15 DIAGNOSIS — E89.2 POST-SURGICAL HYPOPARATHYROIDISM (H): ICD-10-CM

## 2023-03-15 DIAGNOSIS — E89.0 POSTSURGICAL HYPOTHYROIDISM: ICD-10-CM

## 2023-03-15 DIAGNOSIS — C73 PAPILLARY THYROID CARCINOMA (H): ICD-10-CM

## 2023-03-15 LAB
CALCIUM SERPL-MCNC: 9.2 MG/DL (ref 8.6–10)
CREAT SERPL-MCNC: 0.95 MG/DL (ref 0.51–0.95)
GFR SERPL CREATININE-BSD FRML MDRD: 70 ML/MIN/1.73M2
PHOSPHATE SERPL-MCNC: 4.6 MG/DL (ref 2.5–4.5)

## 2023-03-15 PROCEDURE — 82565 ASSAY OF CREATININE: CPT

## 2023-03-15 PROCEDURE — 36415 COLL VENOUS BLD VENIPUNCTURE: CPT

## 2023-03-15 PROCEDURE — 82310 ASSAY OF CALCIUM: CPT

## 2023-03-15 PROCEDURE — 84100 ASSAY OF PHOSPHORUS: CPT

## 2023-03-21 ENCOUNTER — TELEPHONE (OUTPATIENT)
Dept: FAMILY MEDICINE | Facility: CLINIC | Age: 58
End: 2023-03-21
Payer: COMMERCIAL

## 2023-03-21 DIAGNOSIS — Z12.11 SPECIAL SCREENING FOR MALIGNANT NEOPLASMS, COLON: Primary | ICD-10-CM

## 2023-03-21 NOTE — TELEPHONE ENCOUNTER
From: Radha Mcdermott   Sent: 3/9/2023   7:57 PM CDT   [facility]   Subject: Referral Request                                   Radha Mcdermott would like to request a referral.   Reason: Colonoscopy referral   Requested provider: Jenna Rey   Comment:   Hi ,    It is time for me to get a colonoscopy done but I need a referral from you.    Let me know else you need from me to get this done.    Thanks    Ivan

## 2023-03-22 NOTE — TELEPHONE ENCOUNTER
PREET from Dr. Rey to place referral for colonoscopy. Placed and signed.     Latosha Luna RN on 3/22/2023 at 3:51 PM

## 2023-03-29 ENCOUNTER — TELEPHONE (OUTPATIENT)
Dept: GASTROENTEROLOGY | Facility: CLINIC | Age: 58
End: 2023-03-29
Payer: COMMERCIAL

## 2023-03-29 NOTE — TELEPHONE ENCOUNTER
Screening Questions  BLUE  KIND OF PREP RED  LOCATION [review exclusion criteria] GREEN  SEDATION TYPE        Y Are you active on mychart?       MARY CHOI Ordering/Referring Provider?        [facility] What type of coverage do you have?      N Have you had a positive covid test in the last 14 days?     30.5 1. BMI  [BMI 40+ - review exclusion criteria]    Y  2. Are you able to give consent for your medical care? [IF NO,RN REVIEW]          N  3. Are you taking any prescription pain medications on a routine schedule   (ex narcotics: oxycodone, roxicodone, oxycontin,  and percocet)? [RN Review]          3a. EXTENDED PREP What kind of prescription?     N 4. Do you have any chemical dependencies such as alcohol, street drugs, or methadone?        **If yes 3- 5 , please schedule with MAC sedation.**          IF YES TO ANY 6 - 10 - HOSPITAL SETTING ONLY.     N 6.   Do you need assistance transferring?     N 7.   Have you had a heart or lung transplant?    N 8.   Are you currently on dialysis?   N 9.   Do you use daily home oxygen?   N 10. Do you take nitroglycerin?   10a.  If yes, how often?     11. [FEMALES]   Are you currently pregnant?    11a.  If yes, how many weeks? [ Greater than 12 weeks, OR NEEDED]    N 12. Do you have Pulmonary Hypertension? *NEED PAC APPT AT [facility] w/ MAC*     N 13. [review exclusion criteria]  Do you have any implantable devices in your body (pacemaker, defib, LVAD)?    N 14. In the past 6 months, have you had any heart related issues including cardiomyopathy or heart attack?     14a.  If yes, did it require cardiac stenting if so when?     N 15. Have you had a stroke or Transient ischemic attack (TIA - aka  mini stroke ) within 6 months?      N 16. Do you have mod to severe Obstructive Sleep Apnea?  [Hospital only]    N 17. Do you have SEVERE AND UNCONTROLLED asthma? *NEED PAC APPT AT [facility] w/MAC*     18. Are you currently taking any blood thinners?     18a. No. Continue to  "19.   18b. Yes/no Blood Thinner: No. Inform patient to \"follow up w/ ordering provider for bridging instructions.\"    N 19. Do you take the medication Phentermine?    19a. If yes, \"Hold for 7 days before procedure.  Please consult your prescribing provider if you have questions about holding this medication.\"     N  20. Do you have chronic kidney disease?      N  21. Do you have a diagnosis of diabetes?     N  22. On a regular basis do you go 3-5 days between bowel movements?      23. Preferred LOCAL Pharmacy for Pre Prescription    [ LIST ONLY ONE PHARMACY]        [place], [place] - [address] [place] [address] DR    - CLOSING REMINDERS -    Informed patient they will need an adult    Cannot take any type of public or medical transportation alone    Conscious Sedation- Needs  for 6 hours after the procedure       MAC/General-Needs  for 24 hours after procedure    Pre-Procedure Covid test to be completed [[facility] PCR Testing Required]    Confirmed Nurse will call to complete assessment       - SCHEDULING DETAILS -  NO Hospital Setting Required? If yes, what is the exclusion?:    FANTASMA  Surgeon    6/2/2023  Date of Procedure  Lower Endoscopy [Colonoscopy]  Type of Procedure Scheduled  [facility] Location   MIRALAX GATORADE WITHOUT MAGNEISUM CITRATE Which Colonoscopy Prep was Sent?     MAC Sedation Type     NO PAC / Pre-op Required                 "

## 2023-04-04 ENCOUNTER — TELEPHONE (OUTPATIENT)
Dept: ENDOCRINOLOGY | Facility: CLINIC | Age: 58
End: 2023-04-04
Payer: COMMERCIAL

## 2023-04-04 NOTE — TELEPHONE ENCOUNTER
Patient call:     Appointment type: return endo   Provider: Mickey   Return date:9/25  Speciality phone number: [phone]  Additional appointment(s) needed:   Additional notes: LVM and sent MyC to change to virtual or is pt prefers reschedule for in person date 4/4

## 2023-05-07 DIAGNOSIS — E89.2 POST-SURGICAL HYPOPARATHYROIDISM (H): ICD-10-CM

## 2023-05-08 DIAGNOSIS — C73 PAPILLARY THYROID CARCINOMA (H): ICD-10-CM

## 2023-05-08 DIAGNOSIS — E89.0 POSTSURGICAL HYPOTHYROIDISM: ICD-10-CM

## 2023-05-08 RX ORDER — MAGNESIUM OXIDE 400 MG/1
TABLET ORAL
Qty: 90 TABLET | Refills: 4 | Status: SHIPPED | OUTPATIENT
Start: 2023-05-08 | End: 2024-05-31

## 2023-05-08 RX ORDER — LEVOTHYROXINE SODIUM 125 MCG
TABLET ORAL
Qty: 90 TABLET | Refills: 4 | Status: SHIPPED | OUTPATIENT
Start: 2023-05-08 | End: 2023-09-08 | Stop reason: DRUGHIGH

## 2023-05-08 RX ORDER — CALCITRIOL 0.5 UG/1
CAPSULE, LIQUID FILLED ORAL
Qty: 90 CAPSULE | Refills: 4 | Status: SHIPPED | OUTPATIENT
Start: 2023-05-08 | End: 2024-05-31

## 2023-05-09 ENCOUNTER — TELEPHONE (OUTPATIENT)
Dept: FAMILY MEDICINE | Facility: CLINIC | Age: 58
End: 2023-05-09

## 2023-05-09 NOTE — TELEPHONE ENCOUNTER
PA needed on: brand name synthroid   Insurance: clear script   Ins. Phone: [phone]  Patient ID: [id number]  PCN:  [id number]  BIN: [id number]  Group : [id number]     Please let us know when PA is granted/denied. Thank You      [facility], [place]

## 2023-05-16 NOTE — TELEPHONE ENCOUNTER
Prior Authorization Not Needed per Insurance    Medication: Synthroid DAW1-pa denial on 2/24/2023  Insurance Company:    Expected CoPay:      Pharmacy Filling the Rx: [facility], [address]    See denial rationale on 2/24/2023- MD stated it is not DAW1 patient can pay out of pocket or use generic.     Mickie Haney CPhT  Prior Auth Specialist

## 2023-06-01 ENCOUNTER — ANESTHESIA EVENT (OUTPATIENT)
Dept: GASTROENTEROLOGY | Facility: CLINIC | Age: 58
End: 2023-06-01
Payer: COMMERCIAL

## 2023-06-01 ASSESSMENT — LIFESTYLE VARIABLES: TOBACCO_USE: 1

## 2023-06-01 NOTE — H&P
[facility] History and Physical    Radha Mcdermott MRN# [id number]   Age: 57 year old YOB: 1965     Date of Admission:  (Not on file)    Home clinic: [facility]  Primary care provider: Jenna Rey          Impression and Plan:   Impression:   Special screening for malignant neoplasms, colon [Z12.11]  No prior in colonoscopy in system      Plan:   Proceed to Colonoscopy as planned.  The procedure, risks(bleeding, perforation), benefits and alternatives were discussed and the patient agrees to proceed. Cleared for Anesthesia               Chief Complaint:   Special screening for malignant neoplasms, colon [Z12.11]    History is obtained from the patient          History of Present Illness:   This 57 year old female is being seen at this time for evaluation of for colonoscopy.  No complaints or family hx           Past Medical History:     Past Medical History:   Diagnosis Date     Abnormal Papanicolaou smear of cervix and cervical HPV 12/03    ASCUS - repeat paps all ok.      Depressive disorder, not elsewhere classified      Dysmenorrhea      Fracture of arm age 14    right     Gestational diabetes      Papillary thyroid carcinoma (H) 10/05    5 cm, MF, + ETE, node+; 3 operations, 131I     Postsurgical hypoparathyroidism (H) 2005     Postsurgical hypothyroidism 2005     Unspecified contraceptive management             Past Surgical History:     Past Surgical History:   Procedure Laterality Date     BIOPSY/REMOVAL, LYMPH NODE(S)  10/27/2009    Surgical resection of lymph nodes, neck.  Scar revision. [facility].     HC BIOPSY/EXCISION LYMPH NODE OPEN SUPERFICIAL  2007    removal of some more lymph nodes in the neck.      HC REMOVE TONSILS/ADENOIDS,12+ Y/O  1984     HC THYROIDECTOMY  10/07/2005    Total thyroidectomy.  F[facility]. Further lymph node resection in 12/06            Social History:     Social History     Tobacco Use     Smoking status: Former      Packs/day: 0.50     Years: 15.00     Pack years: 7.50     Types: Cigarettes     Quit date: 3/1/1998     Years since quittin.2     Smokeless tobacco: Never     Tobacco comments:     no smoking in the home   Vaping Use     Vaping status: Not on file   Substance Use Topics     Alcohol use: Yes     Comment: social            Family History:     Family History   Problem Relation Age of Onset     Lipids Mother      Osteoporosis Mother      Cancer Father         skin     Dementia Father      Cancer Paternal Grandfather         prostate cancer     Alzheimer Disease Paternal Grandmother      Heart Disease Paternal Grandmother      Cerebrovascular Disease Paternal Grandmother      Thyroid Disease Paternal Grandmother      Diabetes Maternal Grandmother      Thyroid Disease Maternal Grandmother         thyroid disease     Allergies Son         pcn     Cancer Maternal Aunt         breast - mid-30s            Immunizations:     VACCINE/DOSE   Diptheria   DPT   DTAP   HBIG   Hepatitis A   Hepatitis B   HIB   Influenza   Measles   Meningococcal   MMR   Mumps   Pneumococcal   Polio   Rubella   Small Pox   TDAP   Varicella   Zoster            Allergies:     Allergies   Allergen Reactions     Bees      Chicken Allergy Swelling     Meat Extract Swelling     No Known Drug Allergy             Medications:     No current facility-administered medications for this encounter.     Current Outpatient Medications   Medication Sig     calcitRIOL (ROCALTROL) 0.5 MCG capsule TAKE ONE CAPSULE BY MOUTH ONCE DAILY     diclofenac (VOLTAREN) 75 MG EC tablet Take 1 tablet (75 mg) by mouth 2 times daily     diclofenac (VOLTAREN) 75 MG EC tablet Take 1 tablet (75 mg) by mouth 2 times daily (Patient not taking: No sig reported)     diclofenac (VOLTAREN) 75 MG EC tablet Take 1 tablet (75 mg) by mouth 2 times daily (Patient not taking: No sig reported)     docusate sodium 100 MG tablet Take 100 mg by mouth 2 times daily as needed for constipation  (Patient not taking: No sig reported)     EPINEPHrine (EPIPEN) 0.3 MG/0.3ML injection Inject 0.3 mg into the muscle once as needed for anaphylaxis (Patient not taking: No sig reported)     magnesium oxide (MAG-OX) 400 MG tablet TAKE ONE-HALF TABLET BY MOUTH TWICE A DAY     SYNTHROID 125 MCG tablet MON to SAT 1 tablet/day; SUN 0.5 tablet             Review of Systems:   The review of systems was positive for the following findings.  None.  The remainder of the review of systems was unremarkable.          Physical Exam:   All vitals have been reviewed  not currently breastfeeding.  No intake or output data in the 24 hours ending 06/01/23 0727  SHEENT examination revealed NC/AT, EOMI.  Examination of the chest revealed CTA.  Examination of the heart revealed RRR.  Examination of the abdomen revealed soft, non tender.  The neuromuscular examination was NL.          Data:   All laboratory data reviewed  No results found for any visits on 06/02/23.  -     Yoseph Patiño MD, FACS

## 2023-06-02 ENCOUNTER — ANESTHESIA (OUTPATIENT)
Dept: GASTROENTEROLOGY | Facility: CLINIC | Age: 58
End: 2023-06-02
Payer: COMMERCIAL

## 2023-06-02 ENCOUNTER — HOSPITAL ENCOUNTER (OUTPATIENT)
Facility: CLINIC | Age: 58
Discharge: HOME | End: 2023-06-02
Attending: SPECIALIST | Admitting: SPECIALIST
Payer: COMMERCIAL

## 2023-06-02 VITALS
SYSTOLIC BLOOD PRESSURE: 117 MMHG | HEIGHT: 65 IN | BODY MASS INDEX: 32.18 KG/M2 | HEART RATE: 54 BPM | DIASTOLIC BLOOD PRESSURE: 61 MMHG | RESPIRATION RATE: 16 BRPM | TEMPERATURE: 98.1 F | OXYGEN SATURATION: 97 %

## 2023-06-02 LAB — COLONOSCOPY: NORMAL

## 2023-06-02 PROCEDURE — 88305 TISSUE EXAM BY PATHOLOGIST: CPT | Mod: TC | Performed by: SPECIALIST

## 2023-06-02 PROCEDURE — 88305 TISSUE EXAM BY PATHOLOGIST: CPT | Mod: 26 | Performed by: PATHOLOGY

## 2023-06-02 PROCEDURE — 258N000003 HC RX IP 258 OP 636: Performed by: NURSE ANESTHETIST, CERTIFIED REGISTERED

## 2023-06-02 PROCEDURE — 370N000017 HC ANESTHESIA TECHNICAL FEE, PER MIN: Performed by: SPECIALIST

## 2023-06-02 PROCEDURE — 250N000009 HC RX 250: Performed by: NURSE ANESTHETIST, CERTIFIED REGISTERED

## 2023-06-02 PROCEDURE — 45385 COLONOSCOPY W/LESION REMOVAL: CPT | Mod: PT | Performed by: SPECIALIST

## 2023-06-02 PROCEDURE — 250N000011 HC RX IP 250 OP 636: Performed by: NURSE ANESTHETIST, CERTIFIED REGISTERED

## 2023-06-02 PROCEDURE — 250N000009 HC RX 250: Performed by: SPECIALIST

## 2023-06-02 PROCEDURE — 45380 COLONOSCOPY AND BIOPSY: CPT | Performed by: SPECIALIST

## 2023-06-02 RX ORDER — PROPOFOL 10 MG/ML
INJECTION, EMULSION INTRAVENOUS CONTINUOUS PRN
Status: DISCONTINUED | OUTPATIENT
Start: 2023-06-02 | End: 2023-06-02

## 2023-06-02 RX ORDER — SODIUM CHLORIDE, SODIUM LACTATE, POTASSIUM CHLORIDE, CALCIUM CHLORIDE 600; 310; 30; 20 MG/100ML; MG/100ML; MG/100ML; MG/100ML
INJECTION, SOLUTION INTRAVENOUS CONTINUOUS PRN
Status: DISCONTINUED | OUTPATIENT
Start: 2023-06-02 | End: 2023-06-02

## 2023-06-02 RX ORDER — LIDOCAINE 40 MG/G
CREAM TOPICAL
Status: DISCONTINUED | OUTPATIENT
Start: 2023-06-02 | End: 2023-06-02 | Stop reason: HOSPADM

## 2023-06-02 RX ORDER — OXYCODONE HYDROCHLORIDE 5 MG/1
10 TABLET ORAL
Status: CANCELLED | OUTPATIENT
Start: 2023-06-02

## 2023-06-02 RX ORDER — PROPOFOL 10 MG/ML
INJECTION, EMULSION INTRAVENOUS PRN
Status: DISCONTINUED | OUTPATIENT
Start: 2023-06-02 | End: 2023-06-02

## 2023-06-02 RX ORDER — ONDANSETRON 2 MG/ML
4 INJECTION INTRAMUSCULAR; INTRAVENOUS EVERY 30 MIN PRN
Status: CANCELLED | OUTPATIENT
Start: 2023-06-02

## 2023-06-02 RX ORDER — SODIUM CHLORIDE, SODIUM LACTATE, POTASSIUM CHLORIDE, CALCIUM CHLORIDE 600; 310; 30; 20 MG/100ML; MG/100ML; MG/100ML; MG/100ML
INJECTION, SOLUTION INTRAVENOUS CONTINUOUS
Status: DISCONTINUED | OUTPATIENT
Start: 2023-06-02 | End: 2023-06-02 | Stop reason: HOSPADM

## 2023-06-02 RX ORDER — FENTANYL CITRATE 50 UG/ML
25 INJECTION, SOLUTION INTRAMUSCULAR; INTRAVENOUS
Status: CANCELLED | OUTPATIENT
Start: 2023-06-02

## 2023-06-02 RX ORDER — ONDANSETRON 4 MG/1
4 TABLET, ORALLY DISINTEGRATING ORAL EVERY 30 MIN PRN
Status: CANCELLED | OUTPATIENT
Start: 2023-06-02

## 2023-06-02 RX ORDER — LIDOCAINE HYDROCHLORIDE 20 MG/ML
INJECTION, SOLUTION INFILTRATION; PERINEURAL PRN
Status: DISCONTINUED | OUTPATIENT
Start: 2023-06-02 | End: 2023-06-02

## 2023-06-02 RX ORDER — OXYCODONE HYDROCHLORIDE 5 MG/1
5 TABLET ORAL
Status: CANCELLED | OUTPATIENT
Start: 2023-06-02

## 2023-06-02 RX ORDER — LIDOCAINE 40 MG/G
CREAM TOPICAL
Status: DISCONTINUED | OUTPATIENT
Start: 2023-06-02 | End: 2023-06-02

## 2023-06-02 RX ADMIN — LIDOCAINE HYDROCHLORIDE 50 MG: 20 INJECTION, SOLUTION INFILTRATION; PERINEURAL at 12:46

## 2023-06-02 RX ADMIN — PROPOFOL 100 MG: 10 INJECTION, EMULSION INTRAVENOUS at 12:46

## 2023-06-02 RX ADMIN — SODIUM CHLORIDE, POTASSIUM CHLORIDE, SODIUM LACTATE AND CALCIUM CHLORIDE 10 ML/HR: 600; 310; 30; 20 INJECTION, SOLUTION INTRAVENOUS at 12:04

## 2023-06-02 RX ADMIN — SODIUM CHLORIDE, POTASSIUM CHLORIDE, SODIUM LACTATE AND CALCIUM CHLORIDE: 600; 310; 30; 20 INJECTION, SOLUTION INTRAVENOUS at 12:38

## 2023-06-02 RX ADMIN — LIDOCAINE HYDROCHLORIDE 0.1 ML: 10 INJECTION, SOLUTION EPIDURAL; INFILTRATION; INTRACAUDAL; PERINEURAL at 12:01

## 2023-06-02 RX ADMIN — PROPOFOL 200 MCG/KG/MIN: 10 INJECTION, EMULSION INTRAVENOUS at 12:46

## 2023-06-02 ASSESSMENT — ACTIVITIES OF DAILY LIVING (ADL): ADLS_ACUITY_SCORE: 35

## 2023-06-02 NOTE — DISCHARGE INSTRUCTIONS
[facility]    Home Care Following Endoscopy          Activity:  You have just undergone an endoscopic procedure usually performed with conscious sedation.    Do not work or operate machinery (including a car) for at least 12 hours.    Do not consume any alcoholic products for at least 12 hours.    Diet:  Return to the diet you were on before your procedure but eat lightly for the first 12-24 hours.  Drink plenty of water.  Resume any regular medications unless otherwise advised by your physician.   If you had any biopsy or polyp removed please refrain from aspirin or aspirin products for 2 days.    Pain:  You may take Tylenol as needed for pain.  Expected Recovery:  You can expect some mild abdominal fullness and/or discomfort due to the air used to inflate your intestinal tract. I encourage you to walk and attempt to pass this air as soon as possible.    Call Your Physician if You Have:  After Colonoscopy:  Worsening persisting abdominal pain which is worse with activity.  Fevers (>101 degrees F), chills or shakes.  Passage of continued blood with bowel movements.     Any questions or concerns about your recovery, please call [phone] or after [facility] ([phone]) Nurse Advice Line.    Follow-up Care:  You did have polyps/biopsy tissue sample(s) removed.  The polyps/biopsy tissue sample(s) will be sent to pathology.    You should receive letter in your My Chart  with your results within 1-2 weeks. If you do not participate in My Chart a physical letter will come in the mail in 2-3 weeks.  Please call if you have not received a notification of your results.  If asked to return to clinic please make an appointment 1 week after your procedure.  Call [phone].

## 2023-06-02 NOTE — ANESTHESIA CARE TRANSFER NOTE
Patient: Radha Mcdermott    Procedure: Procedure(s):  COLONOSCOPY, WITH POLYPECTOMY       Diagnosis: Special screening for malignant neoplasms, colon [Z12.11]  Diagnosis Additional Information: No value filed.    Anesthesia Type:   MAC     Note:    Oropharynx: spontaneously breathing  Level of Consciousness: awake  Oxygen Supplementation: room air    Independent Airway: airway patency satisfactory and stable  Dentition: dentition unchanged  Vital Signs Stable: post-procedure vital signs reviewed and stable  Report to RN Given: handoff report given  Patient transferred to: Phase II    Handoff Report: Identifed the Patient, Identified the Reponsible Provider, Reviewed the pertinent medical history, Discussed the surgical course, Reviewed Intra-OP anesthesia mangement and issues during anesthesia, Set expectations for post-procedure period and Allowed opportunity for questions and acknowledgement of understanding      Vitals:  Vitals Value Taken Time   /65 06/02/23 1309   Temp     Pulse 69 06/02/23 1309   Resp     SpO2 95 % 06/02/23 1312   Vitals shown include unvalidated device data.    Electronically Signed By: WANDA Costa CRNA  June 2, 2023  1:13 PM

## 2023-06-02 NOTE — ANESTHESIA PREPROCEDURE EVALUATION
Anesthesia Pre-Procedure Evaluation    Patient: Radha Mcdermott   MRN: [id number] : 1965        Procedure : Procedure(s):  Colonoscopy          Past Medical History:   Diagnosis Date     Abnormal Papanicolaou smear of cervix and cervical HPV     ASCUS - repeat paps all ok.      Depressive disorder, not elsewhere classified      Dysmenorrhea      Fracture of arm age 14    right     Gestational diabetes      Papillary thyroid carcinoma (H) 10/05    5 cm, MF, + ETE, node+; 3 operations, 131I     Postsurgical hypoparathyroidism (H)      Postsurgical hypothyroidism      Unspecified contraceptive management       Past Surgical History:   Procedure Laterality Date     BIOPSY/REMOVAL, LYMPH NODE(S)  10/27/2009    Surgical resection of lymph nodes, neck.  Scar revision. [facility].     HC BIOPSY/EXCISION LYMPH NODE OPEN SUPERFICIAL      removal of some more lymph nodes in the neck.      HC REMOVE TONSILS/ADENOIDS,14+ Y/O       HC THYROIDECTOMY  10/07/2005    Total thyroidectomy.  [facility]. Further lymph node resection in       Allergies   Allergen Reactions     Bees      Chicken Allergy Swelling     Meat Extract Swelling     No Known Drug Allergy       Social History     Tobacco Use     Smoking status: Former     Packs/day: 0.50     Years: 15.00     Pack years: 7.50     Types: Cigarettes     Quit date: 3/1/1998     Years since quittin.2     Smokeless tobacco: Never     Tobacco comments:     no smoking in the home   Vaping Use     Vaping status: Not on file   Substance Use Topics     Alcohol use: Yes     Comment: social      Wt Readings from Last 1 Encounters:   22 87.1 kg (192 lb)        Anesthesia Evaluation   Pt has had prior anesthetic. Type: MAC and General.    No history of anesthetic complications       ROS/MED HX  ENT/Pulmonary: Comment: Quit Smokin98  Ground glass opacity present on imaging of lung  Pulmonary nodules    (+) tobacco use, Past use, 8  Pack-Year Hx,       Neurologic:  - neg neurologic ROS     Cardiovascular:     (+) --CAD angina-with excertion. --Previous cardiac testing   Echo: Date: 2018 Results:  [facility],[place]  Echocardiography Laboratory  [address] [place] [address]     Name: MELONY LOPEZ  MRN: [id number]  : 1965  Study Date: 2018 02:35 PM  Age: 53 yrs  Gender: Female  Patient Location: [facility] [place]  Reason For Study: Chest Pain  Ordering Physician: MANDI WEEKS  Referring Physician: GARTH CROWDER  Performed By: Clemente Quiles     BSA: 2.0 m2  Height: 66 in  Weight: 191 lb  HR: 75  BP: 116/55 mmHg  _____________________________________________________________________________  __        Procedure  Complete Portable Echo Adult.  _____________________________________________________________________________  __        Interpretation Summary  Left ventricular size and systolic function is normal.The EF is 55-60%. No  diagnostic wall motion abnormality.  Right ventricular function, chamber size, wall motion, and thickness are  normal.  No significant valvular abnormalities.  IVC is normal in size 2.0 cm.  No pericardial effusion is present.  _____________________________________________________________________________  __        Left Ventricle  Left ventricular size is normal. Left ventricular wall thickness is normal.  Left ventricular function is normal.The EF is 55-60%. Left ventricular  diastolic function is indeterminate. Regional wall motion is probably normal.     Right Ventricle  Right ventricular function, chamber size, wall motion, and thickness are  normal.     Atria  Borderline biatrial enlargement. The atrial septum is intact as assessed by  color Doppler .     Mitral Valve  The mitral valve is normal.        Aortic Valve  Aortic valve is normal in structure and function. The aortic valve is  tricuspid.     Tricuspid Valve  The tricuspid valve is normal. Trace tricuspid insufficiency is  present. The  peak velocity of the tricuspid regurgitant jet is not obtainable.     Pulmonic Valve  The pulmonic valve is normal.     Vessels  The thoracic aorta is normal. The pulmonary artery is normal. IVC is normal in  size 2.0 cm.     Pericardium  No pericardial effusion is present.        Compared to Previous Study  Previous study not available for comparison.     Attestation  I have personally viewed the imaging and agree with the interpretation and  report as documented by the fellow, Lee Watson, and/or edited by me.  _____________________________________________________________________________  __     MMode/2D Measurements & Calculations  IVSd: 1.1 cm  LVIDd: 4.8 cm  LVIDs: 3.1 cm  LVPWd: 0.94 cm  FS: 35.5 %  LV mass(C)d: 167.3 grams  LV mass(C)dI: 85.3 grams/m2  Ao root diam: 2.8 cm  LA dimension: 3.8 cm  asc Aorta Diam: 3.0 cm  LA/Ao: 1.4  LVOT diam: 1.8 cm  LVOT area: 2.5 cm2  LA Volume (BP): 66.4 ml     LA Volume Index (BP): 33.9 ml/m2  RWT: 0.39        Doppler Measurements & Calculations  MV E max daisy: 70.8 cm/sec  MV A max daisy: 70.8 cm/sec  MV E/A: 1.0  MV dec slope: 408.0 cm/sec2  E/E' avg: 10.0  Lateral E/e': 7.2  Medial E/e': 12.8     _____________________________________________________________________________  __           Report approved by: Judith Ward 11/29/2018 04:41 PM    Stress Test: Date: Results:    ECG Reviewed: Date: Results:    Cath: Date: Results:      METS/Exercise Tolerance:     Hematologic:  - neg hematologic  ROS     Musculoskeletal:  - neg musculoskeletal ROS     GI/Hepatic:  - neg GI/hepatic ROS     Renal/Genitourinary:  - neg Renal ROS     Endo:     (+) thyroid problem, hypothyroidism, Obesity,     Psychiatric/Substance Use:     (+) psychiatric history anxiety and depression     Infectious Disease:       Malignancy:   (+) Malignancy, History of Other.Other CA Papillary thyroid carcinoma Remission status post Surgery.    Other:  - neg other ROS          Physical  Exam    Airway  airway exam normal      Mallampati: II   TM distance: > 3 FB   Neck ROM: full   Mouth opening: > 3 cm    Respiratory Devices and Support         Dental           Cardiovascular   cardiovascular exam normal       Rhythm and rate: regular and normal     Pulmonary   pulmonary exam normal        breath sounds clear to auscultation           OUTSIDE LABS:  CBC:   Lab Results   Component Value Date    WBC 5.5 11/30/2018    WBC 7.1 11/29/2018    HGB 12.7 11/30/2018    HGB 12.7 11/29/2018    HCT 39.4 11/30/2018    HCT 39.3 11/29/2018     11/30/2018     11/29/2018     BMP:   Lab Results   Component Value Date     11/29/2018     11/29/2018    POTASSIUM 4.2 10/28/2020    POTASSIUM 3.5 02/04/2020    CHLORIDE 108 11/29/2018    CHLORIDE 106 11/29/2018    CO2 27 11/29/2018    CO2 27 11/29/2018    BUN 17 11/29/2018    BUN 19 11/29/2018    CR 0.95 03/15/2023    CR 0.95 11/28/2022    GLC 92 11/29/2018    GLC 98 11/29/2018     COAGS:   Lab Results   Component Value Date    PTT 30 11/22/2006    INR 1.03 11/22/2006     POC:   Lab Results   Component Value Date    BGM 94 08/17/2010    HCG Negative 12/22/2006     HEPATIC:   Lab Results   Component Value Date    ALBUMIN 3.8 11/29/2018    PROTTOTAL 7.9 11/29/2018    ALT 29 11/29/2018    AST 17 11/29/2018    ALKPHOS 71 11/29/2018    BILITOTAL 0.3 11/29/2018     OTHER:   Lab Results   Component Value Date    PH 7.0 12/05/2002    A1C 5.5 09/24/2018    PACO 9.2 03/15/2023    PHOS 4.6 (H) 03/15/2023    MAG 2.1 11/29/2018    LIPASE 189 11/29/2018    TSH 0.02 (L) 11/28/2022    T4 1.56 (H) 11/28/2022    T3 129 03/22/2010       Anesthesia Plan    ASA Status:  2   NPO Status:  NPO Appropriate    Anesthesia Type: MAC.     - Reason for MAC: straight local not clinically adequate   Induction: Intravenous, Propofol.   Maintenance: TIVA.        Consents    Anesthesia Plan(s) and associated risks, benefits, and realistic alternatives discussed. Questions answered  and patient/representative(s) expressed understanding.    - Discussed:     - Discussed with:  Patient      - Extended Intubation/Ventilatory Support Discussed: No.      - Patient is DNR/DNI Status: No    Use of blood products discussed: No .     Postoperative Care    Pain management: IV analgesics.        Comments:    Other Comments: The risks and benefits of anesthesia, and the alternatives where applicable, have been discussed with the patient, and they wish to proceed.            WANDA Costa CRNA

## 2023-06-02 NOTE — ANESTHESIA POSTPROCEDURE EVALUATION
Patient: Radha Mcdermott    Procedure: Procedure(s):  COLONOSCOPY, WITH POLYPECTOMY       Anesthesia Type:  MAC    Note:  Disposition: Outpatient   Postop Pain Control: Uneventful            Sign Out: Well controlled pain   PONV: No   Neuro/Psych: Uneventful            Sign Out: Acceptable/Baseline neuro status   Airway/Respiratory: Uneventful            Sign Out: Acceptable/Baseline resp. status   CV/Hemodynamics: Uneventful            Sign Out: Acceptable CV status   Other NRE: NONE   DID A NON-ROUTINE EVENT OCCUR? No    Event details/Postop Comments:  Pt was happy with anesthesia care.  No complications.  I will follow up with the pt if needed.           Last vitals:  Vitals:    06/02/23 1152   BP: (!) 141/61   Pulse: 87   Resp: 19   Temp: 98.1  F (36.7  C)   SpO2: 99%       Electronically Signed By: WANDA Costa CRNA  June 2, 2023  1:14 PM

## 2023-06-09 ENCOUNTER — OFFICE VISIT (OUTPATIENT)
Dept: FAMILY MEDICINE | Facility: CLINIC | Age: 58
End: 2023-06-09
Payer: COMMERCIAL

## 2023-06-09 VITALS
WEIGHT: 191.25 LBS | TEMPERATURE: 96.9 F | SYSTOLIC BLOOD PRESSURE: 100 MMHG | OXYGEN SATURATION: 99 % | BODY MASS INDEX: 30.74 KG/M2 | HEART RATE: 54 BPM | DIASTOLIC BLOOD PRESSURE: 60 MMHG | HEIGHT: 66 IN

## 2023-06-09 DIAGNOSIS — Z12.31 ENCOUNTER FOR SCREENING MAMMOGRAM FOR MALIGNANT NEOPLASM OF BREAST: ICD-10-CM

## 2023-06-09 DIAGNOSIS — Z91.030 BEE STING ALLERGY: ICD-10-CM

## 2023-06-09 DIAGNOSIS — N95.0 POSTMENOPAUSAL BLEEDING: ICD-10-CM

## 2023-06-09 DIAGNOSIS — Z13.820 SCREENING FOR OSTEOPOROSIS: ICD-10-CM

## 2023-06-09 DIAGNOSIS — R42 DIZZINESS: ICD-10-CM

## 2023-06-09 DIAGNOSIS — Z12.4 CERVICAL CANCER SCREENING: ICD-10-CM

## 2023-06-09 DIAGNOSIS — T78.00XA ALLERGY WITH ANAPHYLAXIS DUE TO FOOD: ICD-10-CM

## 2023-06-09 DIAGNOSIS — H92.01 RIGHT EAR PAIN: ICD-10-CM

## 2023-06-09 DIAGNOSIS — Z00.00 ROUTINE GENERAL MEDICAL EXAMINATION AT A HEALTH CARE FACILITY: Primary | ICD-10-CM

## 2023-06-09 DIAGNOSIS — R73.03 PREDIABETES: ICD-10-CM

## 2023-06-09 DIAGNOSIS — H91.93 BILATERAL HEARING LOSS, UNSPECIFIED HEARING LOSS TYPE: ICD-10-CM

## 2023-06-09 DIAGNOSIS — R00.2 PALPITATIONS: ICD-10-CM

## 2023-06-09 DIAGNOSIS — R73.09 ELEVATED GLUCOSE: ICD-10-CM

## 2023-06-09 DIAGNOSIS — Z13.6 CARDIOVASCULAR SCREENING; LDL GOAL LESS THAN 160: ICD-10-CM

## 2023-06-09 LAB
ANION GAP SERPL CALCULATED.3IONS-SCNC: 9 MMOL/L (ref 7–15)
BUN SERPL-MCNC: 15.2 MG/DL (ref 6–20)
CALCIUM SERPL-MCNC: 8.8 MG/DL (ref 8.6–10)
CHLORIDE SERPL-SCNC: 103 MMOL/L (ref 98–107)
CHOLEST SERPL-MCNC: 217 MG/DL
CREAT SERPL-MCNC: 0.98 MG/DL (ref 0.51–0.95)
DEPRECATED HCO3 PLAS-SCNC: 28 MMOL/L (ref 22–29)
ERYTHROCYTE [DISTWIDTH] IN BLOOD BY AUTOMATED COUNT: 13.4 % (ref 10–15)
GFR SERPL CREATININE-BSD FRML MDRD: 68 ML/MIN/1.73M2
GLUCOSE SERPL-MCNC: 103 MG/DL (ref 70–99)
HBA1C MFR BLD: 5.7 %
HCT VFR BLD AUTO: 37.6 % (ref 35–47)
HDLC SERPL-MCNC: 51 MG/DL
HGB BLD-MCNC: 12.2 G/DL (ref 11.7–15.7)
LDLC SERPL CALC-MCNC: 146 MG/DL
MCH RBC QN AUTO: 28.7 PG (ref 26.5–33)
MCHC RBC AUTO-ENTMCNC: 32.4 G/DL (ref 31.5–36.5)
MCV RBC AUTO: 89 FL (ref 78–100)
NONHDLC SERPL-MCNC: 166 MG/DL
PLATELET # BLD AUTO: 236 10E3/UL (ref 150–450)
POTASSIUM SERPL-SCNC: 3.8 MMOL/L (ref 3.4–5.3)
RBC # BLD AUTO: 4.25 10E6/UL (ref 3.8–5.2)
SODIUM SERPL-SCNC: 140 MMOL/L (ref 136–145)
TRIGL SERPL-MCNC: 101 MG/DL
WBC # BLD AUTO: 5 10E3/UL (ref 4–11)

## 2023-06-09 PROCEDURE — 83036 HEMOGLOBIN GLYCOSYLATED A1C: CPT | Performed by: FAMILY MEDICINE

## 2023-06-09 PROCEDURE — 36415 COLL VENOUS BLD VENIPUNCTURE: CPT | Performed by: FAMILY MEDICINE

## 2023-06-09 PROCEDURE — 85027 COMPLETE CBC AUTOMATED: CPT | Performed by: FAMILY MEDICINE

## 2023-06-09 PROCEDURE — 80048 BASIC METABOLIC PNL TOTAL CA: CPT | Performed by: FAMILY MEDICINE

## 2023-06-09 PROCEDURE — 87624 HPV HI-RISK TYP POOLED RSLT: CPT | Performed by: FAMILY MEDICINE

## 2023-06-09 PROCEDURE — G0145 SCR C/V CYTO,THINLAYER,RESCR: HCPCS | Performed by: FAMILY MEDICINE

## 2023-06-09 PROCEDURE — 99214 OFFICE O/P EST MOD 30 MIN: CPT | Mod: 25 | Performed by: FAMILY MEDICINE

## 2023-06-09 PROCEDURE — 80061 LIPID PANEL: CPT | Performed by: FAMILY MEDICINE

## 2023-06-09 PROCEDURE — 99386 PREV VISIT NEW AGE 40-64: CPT | Performed by: FAMILY MEDICINE

## 2023-06-09 RX ORDER — EPINEPHRINE 0.3 MG/.3ML
0.3 INJECTION SUBCUTANEOUS
Qty: 2 EACH | Refills: 1 | Status: SHIPPED | OUTPATIENT
Start: 2023-06-09

## 2023-06-09 ASSESSMENT — ENCOUNTER SYMPTOMS
SORE THROAT: 0
DIZZINESS: 1
CONSTIPATION: 0
DYSURIA: 0
NAUSEA: 0
DIARRHEA: 0
WEAKNESS: 0
ARTHRALGIAS: 0
FREQUENCY: 0
EYE PAIN: 0
COUGH: 0
PARESTHESIAS: 0
HEMATOCHEZIA: 0
HEADACHES: 0
SHORTNESS OF BREATH: 1
NERVOUS/ANXIOUS: 0
FEVER: 0
ABDOMINAL PAIN: 1
CHILLS: 0
PALPITATIONS: 1
BREAST MASS: 0
HEMATURIA: 0
JOINT SWELLING: 0
MYALGIAS: 0
HEARTBURN: 0

## 2023-06-09 ASSESSMENT — ANXIETY QUESTIONNAIRES
6. BECOMING EASILY ANNOYED OR IRRITABLE: SEVERAL DAYS
GAD7 TOTAL SCORE: 5
7. FEELING AFRAID AS IF SOMETHING AWFUL MIGHT HAPPEN: NOT AT ALL
2. NOT BEING ABLE TO STOP OR CONTROL WORRYING: SEVERAL DAYS
7. FEELING AFRAID AS IF SOMETHING AWFUL MIGHT HAPPEN: NOT AT ALL
4. TROUBLE RELAXING: SEVERAL DAYS
GAD7 TOTAL SCORE: 5
1. FEELING NERVOUS, ANXIOUS, OR ON EDGE: SEVERAL DAYS
GAD7 TOTAL SCORE: 5
8. IF YOU CHECKED OFF ANY PROBLEMS, HOW DIFFICULT HAVE THESE MADE IT FOR YOU TO DO YOUR WORK, TAKE CARE OF THINGS AT HOME, OR GET ALONG WITH OTHER PEOPLE?: SOMEWHAT DIFFICULT
5. BEING SO RESTLESS THAT IT IS HARD TO SIT STILL: NOT AT ALL
IF YOU CHECKED OFF ANY PROBLEMS ON THIS QUESTIONNAIRE, HOW DIFFICULT HAVE THESE PROBLEMS MADE IT FOR YOU TO DO YOUR WORK, TAKE CARE OF THINGS AT HOME, OR GET ALONG WITH OTHER PEOPLE: SOMEWHAT DIFFICULT
3. WORRYING TOO MUCH ABOUT DIFFERENT THINGS: SEVERAL DAYS

## 2023-06-09 ASSESSMENT — PATIENT HEALTH QUESTIONNAIRE - PHQ9
SUM OF ALL RESPONSES TO PHQ QUESTIONS 1-9: 8
10. IF YOU CHECKED OFF ANY PROBLEMS, HOW DIFFICULT HAVE THESE PROBLEMS MADE IT FOR YOU TO DO YOUR WORK, TAKE CARE OF THINGS AT HOME, OR GET ALONG WITH OTHER PEOPLE: SOMEWHAT DIFFICULT
SUM OF ALL RESPONSES TO PHQ QUESTIONS 1-9: 8

## 2023-06-09 NOTE — RESULT ENCOUNTER NOTE
Radha, here are your lab results. Your cholesterol is ok, just a little higher than I'd like Not high enough to need medication.   Your blood count is perfectly normal so you are not anemic.  Your blood sugar is just borderline elevated so I am going to add an A1c test for diabetes.  Your kidney function and electrolytes are normal.  Jenna Rey MD

## 2023-06-09 NOTE — RESULT ENCOUNTER NOTE
"Radha, here is your A1c test.  It came back showing that you are \"prediabetic\".  This is something we want to be aggressive about reversing.  I am going to put in a referral to the diabetes education team to meet with you to discuss ways to reduce your risk of progressing to diabetes.   Weight loss is the #1 thing you can do to help reduce this risk.  I recommend more regular aggressive cardiovascular exercise, minimum of 30 minutes/day.  I then recommend we recheck this in 1 year.  Jenna Rey MD "

## 2023-06-09 NOTE — PROGRESS NOTES
SUBJECTIVE:   CC: Radha is an 57 year old who presents for preventive health visit.       2023     8:00 AM   Additional Questions   Roomed by Tia DEJESUS MA     Healthy Habits:     Getting at least 3 servings of Calcium per day:  NO    Bi-annual eye exam:  NO    Dental care twice a year:  Yes    Sleep apnea or symptoms of sleep apnea:  None    Diet:  Regular (no restrictions)    Frequency of exercise:  2-3 days/week    Duration of exercise:  30-45 minutes    Taking medications regularly:  Yes    Medication side effects:  None    PHQ-2 Total Score: 0    Additional concerns today:  Yes      Have you ever done Advance Care Planning? (For example, a Health Directive, POLST, or a discussion with a medical provider or your loved ones about your wishes): Yes, patient states has an Advance Care Planning document and will bring a copy to the clinic.    Social History     Tobacco Use    Smoking status: Former     Packs/day: 0.50     Years: 16.00     Pack years: 8.00     Types: Cigarettes     Quit date: 3/1/1998     Years since quittin.2    Smokeless tobacco: Never    Tobacco comments:     no smoking in the home   Vaping Use    Vaping status: Never Used   Substance Use Topics    Alcohol use: Yes     Comment: social             2023     8:04 AM   Alcohol Use   Prescreen: >3 drinks/day or >7 drinks/week? No     Reviewed orders with patient.  Reviewed health maintenance and updated orders accordingly - Yes    Breast Cancer Screening:    FHS-7:       2022    12:32 PM 2022    10:50 AM 2023     8:05 AM   Breast CA Risk Assessment (FHS-7)   Did any of your first-degree relatives have breast or ovarian cancer? No No No   Did any of your relatives have bilateral breast cancer? No No Unknown   Did any man in your family have breast cancer? No No No   Did any woman in your family have breast and ovarian cancer? No No Yes   Did any woman in your family have breast cancer before age 50 y? No Yes Yes   Do you  have 2 or more relatives with breast and/or ovarian cancer? No No Yes   Do you have 2 or more relatives with breast and/or bowel cancer? No No Yes     Pertinent mammograms are reviewed under the imaging tab.    History of abnormal Pap smear: NO - age 30- 65 PAP every 3 years recommended      7/9/2014    12:00 AM 5/2/2011    12:00 AM 2/27/2008    12:00 AM   PAP / HPV   PAP (Historical) NIL   NIL   NIL       Reviewed and updated as needed this visit by clinical staff   Tobacco  Allergies  Meds              Reviewed and updated as needed this visit by Provider                   Review of Systems   Constitutional: Negative for chills and fever.   HENT: Positive for ear pain and hearing loss. Negative for congestion and sore throat.    Eyes: Positive for visual disturbance. Negative for pain.   Respiratory: Positive for shortness of breath. Negative for cough.    Cardiovascular: Positive for palpitations. Negative for chest pain and peripheral edema.   Gastrointestinal: Positive for abdominal pain. Negative for constipation, diarrhea, heartburn, hematochezia and nausea.   Breasts:  Negative for tenderness, breast mass and discharge.   Genitourinary: Positive for pelvic pain and vaginal bleeding. Negative for dysuria, frequency, genital sores, hematuria, urgency and vaginal discharge.   Musculoskeletal: Negative for arthralgias, joint swelling and myalgias.   Skin: Negative for rash.   Neurological: Positive for dizziness. Negative for weakness, headaches and paresthesias.   Psychiatric/Behavioral: Positive for mood changes. The patient is not nervous/anxious.      She gets occasional random palpitations. Feels like her heart thuds fast. No pain, sometimes causes dizzyness, but symptoms are brief. Resolves spontaneous. Not exertional  She had a cardiac cath in 2018 that was normal, ECHO normal at that time as well.   Random SOB with walking. 2-3 days gets 30-45 minutes vigorous exercise with normal tolerance, but  "randomly just walking down the walls or coming in from car will get OOB.  Sister got a pacemaker placed in past 2  Years.     Also after 2 and half years of no vaginal bleeding, she started bleeding 3 to 4 months ago.  She gets pain in the left lower quadrant when she is bleeding, there every day.  She also feels small bumps inside the vaginal lips for about the past 6 months.  Left eye feels blurry and she gets some headaches, occasionally sees black spots.  She is due for an eye appointment.    She is taking Colace 1 daily.       OBJECTIVE:   /60   Pulse 54   Temp 96.9  F (36.1  C) (Temporal)   Ht 1.676 m (5' 6\")   Wt 86.8 kg (191 lb 4 oz)   LMP  (LMP Unknown)   SpO2 99%   BMI 30.87 kg/m    Physical Exam  GENERAL APPEARANCE: healthy, alert and no distress  EYES: Eyes grossly normal to inspection, PERRL and conjunctivae and sclerae normal  HENT: ear canals and TM's normal, nose and mouth without ulcers or lesions, oropharynx clear and oral mucous membranes moist  NECK: no adenopathy, no asymmetry, masses, or scars and thyroid normal to palpation, no bruits.  RESP: lungs clear to auscultation - no rales, rhonchi or wheezes  BREAST: normal without masses, tenderness or nipple discharge and no palpable axillary masses or adenopathy  CV: regular rate and rhythm, normal S1 S2, no S3 or S4, no murmur, click or rub, no peripheral edema and peripheral pulses strong  ABDOMEN: soft, nontender, no hepatosplenomegaly, no masses and bowel sounds normal  Vaginal exam reveals normal external and internal genitalia.  Cervix is closed, long and thick.  No lesions or abnormalities seen.  Pap co-test collected. Bimanual exam reveals a nontender, nongravid uterus with no CMT.  No adnexal masses or tenderness.     MS: no musculoskeletal defects are noted and gait is age appropriate without ataxia  SKIN: no suspicious lesions or rashes  NEURO: Normal strength and tone, sensory exam grossly normal, mentation intact and speech " normal  PSYCH: mentation appears normal and affect normal/bright      ASSESSMENT/PLAN:       ICD-10-CM    1. Routine general medical examination at a health care facility  Z00.00       2. Elevated glucose  R73.09 Hemoglobin A1c      3. Palpitations  R00.2 Adult Leadless EKG Monitor 3 to 7 Days      4. Dizziness  R42 Basic metabolic panel  (Ca, Cl, CO2, Creat, Gluc, K, Na, BUN)      5. Cervical cancer screening  Z12.4 Pap Screen with HPV - recommended age 30 - 65 years     HPV Hold (Lab Only)     HPV High Risk Types DNA Cervical      6. Right ear pain  H92.01 Adult Audiology  Referral     Adult ENT  Referral      7. Bilateral hearing loss, unspecified hearing loss type  H91.93 Adult Audiology  Referral     Adult ENT  Referral      8. Postmenopausal bleeding  N95.0 US Pelvic Complete with Transvaginal     CBC with platelets      9. 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 Bee sting allergy  Z91.030 EPINEPHrine (ANY BX GENERIC EQUIV) 0.3 MG/0.3ML injection 2-pack      11. CARDIOVASCULAR SCREENING; LDL GOAL LESS THAN 160  Z13.6 Lipid panel reflex to direct LDL Fasting      12. Encounter for screening mammogram for malignant neoplasm of breast  Z12.31 MA Screen Bilateral w/[place]au      13. Screening for osteoporosis  Z13.820 DX Hip/Pelvis/Spine      14. Prediabetes  R73.03 AMB Adult Diabetes Educator Referral          Patient has been advised of split billing requirements and indicates understanding: Yes  I recommend a yearly physical, monthly self breast exam, yearly mammogram, Pap smear every 3 years if normal or per ACOG guidelines.    Because of her palpitations I am going to get a Zio patch monitor.  Also will recheck labs on her today.  She has had elevated glucose in the past.  Electrolytes need to be evaluated.    I am also referring her to audiology and ENT.  I recommend she make eye appointment as  "well.    Because of her vaginal bleeding and going to have her get a pelvic ultrasound.        COUNSELING:  Reviewed preventive health counseling, as reflected in patient instructions  Special attention given to:        Regular exercise       Healthy diet/nutrition       Vision screening       Osteoporosis prevention/bone health       Colorectal Cancer Screening    DEXAordered.     BMI:   Estimated body mass index is 30.87 kg/m  as calculated from the following:    Height as of this encounter: 1.676 m (5' 6\").    Weight as of this encounter: 86.8 kg (191 lb 4 oz).   Weight management plan: Discussed healthy diet and exercise guidelines      She reports that she quit smoking about 25 years ago. Her smoking use included cigarettes. She has a 8.00 pack-year smoking history. She has never used smokeless tobacco.          Jenna Rey MD  [facility]  Answers for HPI/ROS submitted by the patient on 6/9/2023  If you checked off any problems, how difficult have these problems made it for you to do your work, take care of things at home, or get along with other people?: Somewhat difficult  PHQ9 TOTAL SCORE: 8  SHINE 7 TOTAL SCORE: 5      "

## 2023-06-13 ENCOUNTER — TELEPHONE (OUTPATIENT)
Dept: FAMILY MEDICINE | Facility: CLINIC | Age: 58
End: 2023-06-13

## 2023-06-13 LAB
BKR LAB AP GYN ADEQUACY: NORMAL
BKR LAB AP GYN INTERPRETATION: NORMAL
BKR LAB AP HPV REFLEX: NORMAL
BKR LAB AP PREVIOUS ABNORMAL: NORMAL
PATH REPORT.COMMENTS IMP SPEC: NORMAL
PATH REPORT.COMMENTS IMP SPEC: NORMAL
PATH REPORT.RELEVANT HX SPEC: NORMAL

## 2023-06-13 NOTE — TELEPHONE ENCOUNTER
Patient: Shaka Hughes Date of Service: 2022     : 1965 Attending: aDle Rosas DO   57 year old male      HYPERBARIC OXYGEN THERAPY PROCEDURE NOTE    Hyperbaric Treatment Number: 6 - Hyperbaric treatment scheduled once a day.   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 Dale Rosas   Consult/Update Date: 22        PROCEDURE:  Pre-Hyperbaric Oxygen Therapy Treatment timeout was completed.    Treatment Profile: 45 fsw X 90 min  Treatment Start Time: 1122  Treatment End Time: 1327  Descent Time (min): 10 min  Air break total time: 10 min  Ascent time (min): 15    Hyperbaric oxygen therapy was monitored during entire course of treatment by the supervising physician.    HISTORY:  This is a 57 year old male type 2 insulin-treated diabetic male smoker with HTN, CAD/CHF, CVA (), seizure (started , none prior, on keppra) admitted 3/18- with worsening pain and erythema to right foot, diagnosed with OM of right 5th MTP joint by MRI. Dr. Sanders followed; treated with vanco + cefepime then meropenem and discharged on PO clindamycin.      Pt called clinic back on 2022 and felt his wound re-openeded  Returned to wound clinic 2022 for re-eval of Left plantar foot ulcer. Left TCC started 2022.      Seen in Clinic 2022 with new wound to his right foot, concern for abscess/infection. RIGHT foot cultured and patient admitted 2022 - 2022.      He is status post repeat wound debridement of the right foot by Dr. Del Real on 2022 with I&D and abcess/deep tissue infection surgically debrided.  Patient returned to OR  22 for further debridement and NPWT application. Wound vac was held due to bleeding that required pressure and surgicel 22.      Patient DC 2022 from hospital and continued to follow outpt  Diabetes Education Scheduling Outreach #1:    Call to patient to schedule. Left message with phone number to call to schedule.    Plan for 2nd outreach attempt within 2 business days.    Carly Lutz [facility]  Diabetes and Nutrition Scheduling       in wound clinic and [facility] for ongoing management of DFU3.  Patient was on antibiotics Augmentin + Doxycycline and will complete regimen on 06/19/22, LS with Dr. Richmond 6/15/2022.  Patient has had BLE arterial duplex 5/23/2022 showing 3 vessel runoff. At 6/29/2022 [facility] visit, pt noted to reamined stalled in healing, so HBOT was discussed and patient agreeable for adjunctive HBOT in setting of DFU3 refractory to > 30 days of care.  Plan to start once authorization approved.      Patient was admitted to [facility] on 7/8/2022 with worsening DFU.  DPM note 7/9/2022 - Surgically would advise 5th ray resection with peroneus brevis to longus tenodesis +/- VAC dependent on remaining tissue for closure; plan to complete 7/11/2022 in PM  ID saw pt 7/9/2022 - vanc/cef/flagyl  MRI 7/9/2022 - Osteomyelitis involves the majority of the fifth metatarsal  5/23/2022 - 3 vessel tibial runoff to B/L feet    Risk Assessment at Consult:    Risk assessment was performed at time of consult/comprehensive evaluation on 7/11/22. Specifically noted risks include:    Other:  .    -History of CHF.  Last EF was 59% 5/10/2022  -History of seizure disorder.  5/10/2022 # Transient episodes of loss of awareness with fall x 4 episodes. No preceding warning. One episode with tongue bite and urinary incontinence. Presumed diagnosis of new onset epilepsy as clinical events most consistent with unprovoked seizures. Neurology follow up 7/20/2022.  On lamictal and keppra. No seizure activity since that time.   -Confinement anxiety, requests premedication    -Patient has diabetes and Last Hgb A1c was 7.2 10/13/2021.  -CXR completed on 5/9/2022 showing no pneumothorax  -At Time of HBOT consult; last negative COVID test last 7/8/2022    Interval History:   7/26/2022  The patient reports no complaints, no chest pain, no shortness of breath, no vision changes, no ear pain, no congestion, no nausea and no fever.    Tolerating HBO without difficulty.   Tolerating NPWT.      Pertinent Reviewed:    Allergies, Medication, Surgical History, Family History, Medical History, Labs and Chest x-ray     7/9/22: MRI Right foot  IMPRESSION:   1. Deep ulceration along the lateral/plantar aspect of the midfoot.  2. Osteomyelitis involves the majority of the fifth metatarsal.  3. Extensive dorsal soft tissue edema.  4. Midfoot degenerative changes.  5. Additional details above.    5/23/22 BLLE arterial duplex US  IMPRESSION:   1.  No evidence of pelvic arterial inflow disease.  2.  No evidence of bilateral femoropopliteal disease.  3.  Three-vessel tibial runoff to the bilateral feet.    5/9/22 CXR  IMPRESSION:  No acute cardiopulmonary findings.    PHYSICAL EXAM:  Daily Risk Assessment:  Finger Stick Glucose:      Recent Labs   Lab 07/26/22  1114   GLUCOSE BEDSIDE 125*        Vital signs:    Temp: 97.1 °F (36.2 °C)  Temp src: Temporal  Heart Rate: (!) 53  Heart Rate Source: Monitor  BP: 108/51     Resp: 16    General appearance:  alert, cooperative, no distress  Head:   normocephalic without obvious abnormality  Ears:   left ear TEED  scale 0 and right ear TEED  scale 2  Lungs:  clear to auscultation bilaterally  Heart:  regular rate and rhythm    7/25/22:   Left foot:  S/p TMA   L foot with full thickness plantar wound with surrounding hard callus.   Deep fissuring between the callus with some hemorraghic staining.  Wound with granular tissue  No purulence or odor  No undermining or tunneling noted  Anastasiya-wound without maceration, erythema, warmth, induration, exudate, crepitus or epidermolysis    Right foot  Status post right 5th ray amputation with partial closure- Distal portion of wound well approximated  Well-approximated stab incision may surface  Wound bed mixture of muscle, granular tissue, tendon with deep palpable bone  Periwound without erythema, warmth, induration    Images 7/25/2022  Left TMA    Lateral right foot    Bottom right foot        POST TREATMENT:    Patient denies  complaints    Patient tolerated the hyperbaric treatment without problems or side effects     Treatment extras: Negative pressure wound therapy continued throughout treatment.    Post-treatment Exam:  No change in appearance or examination from pre-treatment exam.      ASSESSMENT:  This is a 57-year-old male with past medical history of diabetes with chronic diabetic foot ulcer now currently admitted with acute osteomyelitis of 5th metatarsal status post 5th ray amputation and partial closure with Dr. Cross on 7/11/22.  Patient running in HBO for DFU 3.     7/11/22:  5th ray amputation with partial closure Dr Cross.  Wound cultures growing Staph aureus.  Surgical pathology pending    7/13/22: Start HBO    7/25/22: Initiate NPWT    MEDICAL DECISION MAKING:  Based on recent clinical evaluation, the patient is improving.    Indications of this are:  Improvement of tissue ischemia/hypoxia.    Improved tissue viability.   Resolving or resolved signs of clinical infection.   Decreased wound size.     Therefore, we will continue treatment as the patient will likely benefit from further hyperbaric oxygen therapy.     PLAN:  This is hyperbaric Treatment Number: 6 of anticipated 20-30 treatments for DFU 3.      S/P 5th ray amputation with partial closure with Dr Cross on 7/11/22    PO doxycycline through Dr Richmond.     No longer requiring anxiolytics to facilitate HBO.    Continue NPWT with M/W/F dressing changes.     Patient stable at time of discharge.     Daquan Link MD  [facility] Fellow  Patient was seen and examined with Dr. Faustina VELEZ    Teaching Attestation:  I have personally interviewed and examined the patient via face-to-face. I confirmed the findings listed below:    Diabetic ulcer of right midfoot associated with type 2 diabetes mellitus, with necrosis of muscle (CMS/HCC)  (primary encounter diagnosis)     This was discussed with the fellow and I agree with the assessment and  plan as documented. I was present for the entire procedure.The plan of care was discussed with the patient. Patient completed HBOT without difficulty today. No new issues.    Kelle Weems MD  [facility]  Wound Care Dept: [phone]  Hyperbaric Dept: [phone]

## 2023-06-15 LAB
HUMAN PAPILLOMA VIRUS 16 DNA: NEGATIVE
HUMAN PAPILLOMA VIRUS 18 DNA: NEGATIVE
HUMAN PAPILLOMA VIRUS FINAL DIAGNOSIS: NORMAL
HUMAN PAPILLOMA VIRUS OTHER HR: NEGATIVE

## 2023-06-19 ENCOUNTER — HOSPITAL ENCOUNTER (OUTPATIENT)
Dept: CARDIOLOGY | Facility: CLINIC | Age: 58
Discharge: HOME | End: 2023-06-19
Attending: FAMILY MEDICINE | Admitting: FAMILY MEDICINE
Payer: COMMERCIAL

## 2023-06-19 DIAGNOSIS — R00.2 PALPITATIONS: ICD-10-CM

## 2023-06-19 PROCEDURE — 93242 EXT ECG>48HR<7D RECORDING: CPT

## 2023-07-03 ENCOUNTER — TELEPHONE (OUTPATIENT)
Dept: FAMILY MEDICINE | Facility: CLINIC | Age: 58
End: 2023-07-03
Payer: COMMERCIAL

## 2023-07-03 ENCOUNTER — HOSPITAL ENCOUNTER (OUTPATIENT)
Dept: ULTRASOUND IMAGING | Facility: CLINIC | Age: 58
Discharge: HOME | End: 2023-07-03
Attending: FAMILY MEDICINE
Payer: COMMERCIAL

## 2023-07-03 ENCOUNTER — HOSPITAL ENCOUNTER (OUTPATIENT)
Dept: BONE DENSITY | Facility: CLINIC | Age: 58
Discharge: HOME | End: 2023-07-03
Attending: FAMILY MEDICINE
Payer: COMMERCIAL

## 2023-07-03 DIAGNOSIS — N95.0 POSTMENOPAUSAL BLEEDING: ICD-10-CM

## 2023-07-03 DIAGNOSIS — Z13.820 SCREENING FOR OSTEOPOROSIS: ICD-10-CM

## 2023-07-03 DIAGNOSIS — D25.9 UTERINE LEIOMYOMA, UNSPECIFIED LOCATION: ICD-10-CM

## 2023-07-03 DIAGNOSIS — R93.89 ENDOMETRIAL THICKENING ON ULTRASOUND: Primary | ICD-10-CM

## 2023-07-03 PROCEDURE — 76830 TRANSVAGINAL US NON-OB: CPT

## 2023-07-03 PROCEDURE — 77080 DXA BONE DENSITY AXIAL: CPT

## 2023-07-03 NOTE — RESULT ENCOUNTER NOTE
Radha, here is your bone density report. You have thinning of the bones called osteopenia. It's not bad enough to call it osteoporosis, but you are still at a slightly higher risk of broken bones as you age.  At this point I do not recommend any prescription medication, but I do want you to be getting at least 1500 mg of calcium and 2000 units of vitamin D in daily with combination of supplements and dietary intake.  Also please make sure you are getting weightbearing exercise at least 5 days a week.  I will recommend you recheck this in 2 years.

## 2023-07-03 NOTE — TELEPHONE ENCOUNTER
See result note from her pelvic ultrasound, she needs to see OB/GYN for endometrial biopsy.  Jenna Rey MD

## 2023-07-03 NOTE — RESULT ENCOUNTER NOTE
See message below, please set her up with OB/GYN for endometrial biopsy.    Radha, here is your pelvic ultrasound report.  You do have at least 1 and possibly 2 small fibroids.  These are noncancerous tumors of the uterus, and they can cause bleeding.  However, the lining of your uterus is also thicker than expected.  I need you to have a endometrial biopsy.  Because I will be out of the office much of July, I want you to see one of my OB/GYN partners for this.  I am putting in a referral and someone should be calling you to set this up.  Jenna Rey MD

## 2023-07-14 ENCOUNTER — TELEPHONE (OUTPATIENT)
Dept: OBGYN | Facility: OTHER | Age: 58
End: 2023-07-14
Payer: COMMERCIAL

## 2023-07-14 NOTE — TELEPHONE ENCOUNTER
Routed to Dr. Wilburn. Pt needs endometrial biopsy.   Per FP referral and plan COPY/PASTED below from ultrasound report 7/03/2023:    [..lining of your uterus is also thicker than expected.  I need you to have a endometrial biopsy.  Because I will be out of the office much of July, I want you to see one of my OB/GYN partners for this.  I am putting in a referral and someone should be calling you to set this up.]

## 2023-07-14 NOTE — TELEPHONE ENCOUNTER
Reason for Call:  Other call back    Detailed comments: Patient was referred to see obgyn in the next 1-2 weeks for Endometrial thickening on ultrasound Postmenopausal bleeding [N95.0]; Uterineleiomyoma, unspecified location//Jenna Rey MD no openings until 9/18/23 please review. You can reach at [phone]          Phone Number Patient can be reached at: Cell number on file:    Telephone Information:   Mobile [phone]       Best Time: any    Can we leave a detailed message on this number? YES    Call taken on 7/14/2023 at 2:07 PM by Ines Penny

## 2023-07-18 ENCOUNTER — ALLIED HEALTH/NURSE VISIT (OUTPATIENT)
Dept: EDUCATION SERVICES | Facility: CLINIC | Age: 58
End: 2023-07-18
Attending: FAMILY MEDICINE
Payer: COMMERCIAL

## 2023-07-18 DIAGNOSIS — R73.03 PREDIABETES: ICD-10-CM

## 2023-07-18 PROCEDURE — 97802 MEDICAL NUTRITION INDIV IN: CPT

## 2023-07-18 NOTE — PATIENT INSTRUCTIONS
1) 1 serving of carbohydrate = 15 grams  Aim for 3 servings or 45 grams per meal and 15-20 grams of carbs per snack + a serving of protein  *Use my plate to help with meal balance and portion control    2) Increase physical activity- aim for 150 minutes of moderate intense activity per week and at least 2 days of resistance activity.    Other resources: www.diabetes.org OR www.eatright.org OR www.myplate.gov      Thanks,     Marilyn Santizo RDN, OSEAS, [facility]  Outpatient Diabetes Education  Adult Diabetes Education Triage [phone]

## 2023-07-18 NOTE — PROGRESS NOTES
Medical Nutrition Therapy  Visit Type:Initial assessment and intervention    Radha Mcdermott presents today for MNT and education related to prediabetes, A1C 5.7%.   She is accompanied by self.     ASSESSMENT:   Patient comments/concerns relating to nutrition: elevated labs  Has followed diets in the past, feels like Herbalife was most successful for her.  Has been steadily gaining wt since thyroid removal.  Is somewhat active with walking at work and at home.      NUTRITION HISTORY:  Breakfast: silk greek yogurt, blueberries and granola (9:30-10a) OR Mckeon protein shake with 1T PB and 1/2 banana  Lunch: 1/2 hamburger on bun with lettuce, onion and apple OR sandwich- veggies OR femi sandwich 1/3 on hoagie bun- pickles, ham, pulled pork and mustard aoli OR leftovers  Dinner: Protein (1/2 chicken breast or nuggets) with salad with peppers, onion, carrots, cucumber OR half avocado or dressing  Snacks: cookies at work (1), chips- sweet potato (ind bag), triscuits (handful- 15) OR piece of fruit   Beverages: water, diet Mtn dew- morning only    Misses meals? No  Eats out:  seldom     Previous diet education:  No     Food allergies/intolerances: processed meats (antibiotics/hormones) and seafood      EXERCISE: moderate regular exercise program which includes walking at work and at home.    SOCIO/ECONOMIC:   Lives with: self and spouse    MEDICATIONS:  Current Outpatient Medications   Medication     calcitRIOL (ROCALTROL) 0.5 MCG capsule     docusate sodium 100 MG tablet     EPINEPHrine (ANY BX GENERIC EQUIV) 0.3 MG/0.3ML injection 2-pack     magnesium oxide (MAG-OX) 400 MG tablet     SYNTHROID 125 MCG tablet     No current facility-administered medications for this visit.       LABS:  Lab Results   Component Value Date     06/09/2023     11/29/2018      Lab Results   Component Value Date    POTASSIUM 3.8 06/09/2023    POTASSIUM 4.2 10/28/2020     Lab Results   Component Value Date    CHLORIDE 103 06/09/2023     CHLORIDE 108 11/29/2018     Lab Results   Component Value Date    PACO 8.8 06/09/2023    PACO 9.0 10/28/2020     Lab Results   Component Value Date    CO2 28 06/09/2023    CO2 27 11/29/2018     Lab Results   Component Value Date    BUN 15.2 06/09/2023    BUN 17 11/29/2018     Lab Results   Component Value Date    CR 0.98 06/09/2023    CR 1.01 10/28/2020     Lab Results   Component Value Date     06/09/2023    GLC 92 11/29/2018     Lab Results   Component Value Date     06/09/2023     11/30/2018     HDL Cholesterol   Date Value Ref Range Status   11/30/2018 63 >49 mg/dL Final     Direct Measure HDL   Date Value Ref Range Status   06/09/2023 51 >=50 mg/dL Final   ]  GFR Estimate   Date Value Ref Range Status   06/09/2023 68 >60 mL/min/1.73m2 Final     Comment:     eGFR calculated using 2021 CKD-EPI equation.   10/28/2020 63 >60 mL/min/[1.73_m2] Final     Comment:     Non  GFR Calc  Starting 12/18/2018, serum creatinine based estimated GFR (eGFR) will be   calculated using the Chronic Kidney Disease Epidemiology Collaboration   (CKD-EPI) equation.       Lab Results   Component Value Date    CR 0.98 06/09/2023    CR 1.01 10/28/2020     No results found for: MICROALBUMIN    ANTHROPOMETRICS:  Vitals: LMP  (LMP Unknown)   There is no height or weight on file to calculate BMI.      Wt Readings from Last 5 Encounters:   06/09/23 86.8 kg (191 lb 4 oz)   08/16/22 87.1 kg (192 lb)   10/12/20 90.7 kg (200 lb)   09/10/20 90.7 kg (200 lb)   02/04/20 89.8 kg (197 lb 14.4 oz)       Weight Change: stable    ESTIMATED KCAL REQUIREMENTS:  9537-8836 kcal per day    NUTRITION DIAGNOSIS: Altered nutrition-related laboratory values related to calore-protein intake ratio as evidenced by A1C 5.7%- prediabetes range.    NUTRITION INTERVENTION:  Nutrition Prescription: Carbohydrate Intake: 45 grams/meal and 15-20 grams/snacks  Education given to support: general nutrition guidelines, weight reduction,  consistent meals, carb counting, fat modification, exercise, fiber, behavior modification and portion control  Education Materials Provided: My Plate Planner/Choose My Plate, preventing diabetes and Portion Size Guide  Motivational Interviewing    PATIENT'S BEHAVIOR CHANGE GOALS:   See Patient Instructions for patient stated behavior change goals. AVS was printed and given to patient at today's appointment.    MONITOR / EVALUATE:  RD will monitor/evaluate:  Blood Glucose / A1c  Food and nutrition knowledge / skills  Progress toward meeting stated nutrition-related goals  Readiness to change nutrition-related behaviors  Weight change    FOLLOW-UP:  Follow-up appointment scheduled on August 29th, 2pm- in person visit.     Marilyn Santizo RDN, LD, [facility]  Outpatient Diabetes Education  Adult Diabetes Education Triage [phone]    Time spent in minutes: 60  Encounter: Individual

## 2023-07-18 NOTE — LETTER
7/18/2023         RE: Radha Mcdermott  [address] [facility] [address]        Dear Colleague,    Thank you for referring your patient, Radha Mcdermott, to the [facility] DIABETES EDUCATION [place]. Please see a copy of my visit note below.    Medical Nutrition Therapy  Visit Type:Initial assessment and intervention    Radha Mcdermott presents today for MNT and education related to prediabetes, A1C 5.7%.   She is accompanied by self.     ASSESSMENT:   Patient comments/concerns relating to nutrition: elevated labs  Has followed diets in the past, feels like Herbalife was most successful for her.  Has been steadily gaining wt since thyroid removal.  Is somewhat active with walking at work and at home.      NUTRITION HISTORY:  Breakfast: silk greek yogurt, blueberries and granola (9:30-10a) OR Mckeon protein shake with 1T PB and 1/2 banana  Lunch: 1/2 hamburger on bun with lettuce, onion and apple OR sandwich- veggies OR Tajik sandwich 1/3 on hoagie bun- pickles, ham, pulled pork and mustard aoli OR leftovers  Dinner: Protein (1/2 chicken breast or nuggets) with salad with peppers, onion, carrots, cucumber OR half avocado or dressing  Snacks: cookies at work (1), chips- sweet potato (ind bag), triscuits (handful- 15) OR piece of fruit   Beverages: water, diet Mtn dew- morning only    Misses meals? No  Eats out:  seldom     Previous diet education:  No     Food allergies/intolerances: processed meats (antibiotics/hormones) and seafood      EXERCISE: moderate regular exercise program which includes walking at work and at home.    SOCIO/ECONOMIC:   Lives with: self and spouse    MEDICATIONS:  Current Outpatient Medications   Medication     calcitRIOL (ROCALTROL) 0.5 MCG capsule     docusate sodium 100 MG tablet     EPINEPHrine (ANY BX GENERIC EQUIV) 0.3 MG/0.3ML injection 2-pack     magnesium oxide (MAG-OX) 400 MG tablet     SYNTHROID 125 MCG tablet     No current facility-administered medications for  this visit.       LABS:  Lab Results   Component Value Date     06/09/2023     11/29/2018      Lab Results   Component Value Date    POTASSIUM 3.8 06/09/2023    POTASSIUM 4.2 10/28/2020     Lab Results   Component Value Date    CHLORIDE 103 06/09/2023    CHLORIDE 108 11/29/2018     Lab Results   Component Value Date    PACO 8.8 06/09/2023    PACO 9.0 10/28/2020     Lab Results   Component Value Date    CO2 28 06/09/2023    CO2 27 11/29/2018     Lab Results   Component Value Date    BUN 15.2 06/09/2023    BUN 17 11/29/2018     Lab Results   Component Value Date    CR 0.98 06/09/2023    CR 1.01 10/28/2020     Lab Results   Component Value Date     06/09/2023    GLC 92 11/29/2018     Lab Results   Component Value Date     06/09/2023     11/30/2018     HDL Cholesterol   Date Value Ref Range Status   11/30/2018 63 >49 mg/dL Final     Direct Measure HDL   Date Value Ref Range Status   06/09/2023 51 >=50 mg/dL Final   ]  GFR Estimate   Date Value Ref Range Status   06/09/2023 68 >60 mL/min/1.73m2 Final     Comment:     eGFR calculated using 2021 CKD-EPI equation.   10/28/2020 63 >60 mL/min/[1.73_m2] Final     Comment:     Non  GFR Calc  Starting 12/18/2018, serum creatinine based estimated GFR (eGFR) will be   calculated using the Chronic Kidney Disease Epidemiology Collaboration   (CKD-EPI) equation.       Lab Results   Component Value Date    CR 0.98 06/09/2023    CR 1.01 10/28/2020     No results found for: MICROALBUMIN    ANTHROPOMETRICS:  Vitals: LMP  (LMP Unknown)   There is no height or weight on file to calculate BMI.      Wt Readings from Last 5 Encounters:   06/09/23 86.8 kg (191 lb 4 oz)   08/16/22 87.1 kg (192 lb)   10/12/20 90.7 kg (200 lb)   09/10/20 90.7 kg (200 lb)   02/04/20 89.8 kg (197 lb 14.4 oz)       Weight Change: stable    ESTIMATED KCAL REQUIREMENTS:  7496-3384 kcal per day    NUTRITION DIAGNOSIS: Altered nutrition-related laboratory values related to  calore-protein intake ratio as evidenced by A1C 5.7%- prediabetes range.    NUTRITION INTERVENTION:  Nutrition Prescription: Carbohydrate Intake: 45 grams/meal and 15-20 grams/snacks  Education given to support: general nutrition guidelines, weight reduction, consistent meals, carb counting, fat modification, exercise, fiber, behavior modification and portion control  Education Materials Provided: My Plate Planner/Choose My Plate, preventing diabetes and Portion Size Guide  Motivational Interviewing    PATIENT'S BEHAVIOR CHANGE GOALS:   See Patient Instructions for patient stated behavior change goals. AVS was printed and given to patient at today's appointment.    MONITOR / EVALUATE:  RD will monitor/evaluate:  Blood Glucose / A1c  Food and nutrition knowledge / skills  Progress toward meeting stated nutrition-related goals  Readiness to change nutrition-related behaviors  Weight change    FOLLOW-UP:  Follow-up appointment scheduled on August 29th, 2pm- in person visit.     Marilyn Santizo RDN, LD, [facility]  Outpatient Diabetes Education  Adult Diabetes Education Triage [phone]    Time spent in minutes: 60  Encounter: Individual

## 2023-08-18 NOTE — PROGRESS NOTES
SUBJECTIVE:       Spent a total of 20 minutes on the care of Radha on the day of service including 15 minutes of face-to-face time with remainder in chart review,    , Documentation on the day of service.                                                Radha Mcdermott is a 58 year old female who presents to clinic today for the following health issue(s):  No chief complaint on file.    Radha is a 58-year-old P2 who presents with a history of post menopausal bleeding.  She was approximately 2 years amenorrheic when she started to have spotting.  This led to an ultrasound that demonstrated endometrial thickening of approximately 16 mm.  In conversation today we discussed suggestion to have a hysteroscopy D&C for evaluation and treatment of the thickening as well as the spotting that she has been having.    I explained the details of hysteroscopy D&C including the risks of bleeding, infection, anesthesia, trauma and various parameters of each of those.  All questions were addressed.    Her past OB history notes 2 NSVDs 24 and 37 years ago.    Exam is deferred    Past medical history noted for history of thyroid cancer and resection and radiation.  Other than the thyroid medication she is on no other medications.    Assessment post menopausal bleeding with thickened endometrial lining    Plan is to have a hysteroscopy D&C when next available.    No LMP recorded (lmp unknown). Patient is postmenopausal..     Patient is sexually active, .  Using menopause for contraception.    reports that she quit smoking about 25 years ago. Her smoking use included cigarettes. She has a 8.00 pack-year smoking history. She has never used smokeless tobacco.    STD testing offered?  Declined    Health maintenance updated:  yes    Today's PHQ-2 Score:       2022     2:19 PM   PHQ-2 (  [employer])   Q1: Little interest or pleasure in doing things 0   Q2: Feeling down, depressed or hopeless 0   PHQ-2 Score 0   Q1: Little interest  or pleasure in doing things Not at all   Q2: Feeling down, depressed or hopeless Not at all   PHQ-2 Score 0     Today's PHQ-9 Score:       2023     7:57 AM   PHQ-9 SCORE   PHQ-9 Total Score MyChart 8 (Mild depression)   PHQ-9 Total Score 8     Today's SHINE-7 Score:       2023     7:58 AM   SHINE-7 SCORE   Total Score 5 (mild anxiety)   Total Score 5       Problem list and histories reviewed & adjusted, as indicated.  Additional history: as documented.    Patient Active Problem List   Diagnosis    Dysmenorrhea    Contraceptive management    Post-surgical hypoparathyroidism (H)    Disorder of magnesium metabolism    Papillary thyroid carcinoma (H)    Anxiety state    Postsurgical hypothyroidism    Iron deficiency anemia    CARDIOVASCULAR SCREENING; LDL GOAL LESS THAN 160    Health Care Home    Pulmonary nodules    Abdominal mass    Shoulder joint pain    Major depressive disorder, recurrent episode, mild (H)    Cystocele, midline    Class 1 obesity with serious comorbidity and body mass index (BMI) of 32.0 to 32.9 in adult, unspecified obesity type    Chest pain    Unstable angina (H)    S/P coronary angiogram    Abnormal CT of the chest    Ground glass opacity present on imaging of lung     Past Surgical History:   Procedure Laterality Date    BIOPSY/REMOVAL, LYMPH NODE(S)  10/27/2009    Surgical resection of lymph nodes, neck.  Scar revision. [facility].    COLONOSCOPY N/A 2023    tubular adenoma, repeat 5 years     BIOPSY/EXCISION LYMPH NODE OPEN SUPERFICIAL  2007    removal of some more lymph nodes in the neck.     HC REMOVE TONSILS/ADENOIDS,12+ Y/O  1984    HC THYROIDECTOMY  10/07/2005    Total thyroidectomy.  [facility]. Further lymph node resection in       Social History     Tobacco Use    Smoking status: Former     Packs/day: 0.50     Years: 16.00     Pack years: 8.00     Types: Cigarettes     Quit date: 3/1/1998     Years since quittin.4    Smokeless tobacco: Never    Tobacco  comments:     no smoking in the home   Substance Use Topics    Alcohol use: Yes     Comment: social      Problem (# of Occurrences) Relation (Name,Age of Onset)    Dementia (1) Father (Davi)    Allergies (1) Son: pcn    Alzheimer Disease (1) Paternal Grandmother (Viv)    Asthma (1) Brother (Connor)    Cancer (2) Father (Davi): skin, Maternal Aunt: breast - mid-30s    Diabetes (2) Maternal Grandmother (Grama), Cousin (Cousin)    Heart Disease (1) Paternal Grandmother (Viv)    Lipids (1) Mother (Adam)    Osteoporosis (1) Mother (Adam)    Cerebrovascular Disease (1) Paternal Grandmother (Viv)    Thyroid Disease (2) Maternal Grandmother (Grama): thyroid disease, Paternal Grandmother (Viv)    Prostate Cancer (1) Paternal Grandfather (Papa)    Pacemaker (1) Sister (Concepcion): ?a fib              Current Outpatient Medications   Medication Sig    calcitRIOL (ROCALTROL) 0.5 MCG capsule TAKE ONE CAPSULE BY MOUTH ONCE DAILY    docusate sodium 100 MG tablet Take 100 mg by mouth 2 times daily as needed for constipation    EPINEPHrine (ANY BX GENERIC EQUIV) 0.3 MG/0.3ML injection 2-pack Inject 0.3 mLs (0.3 mg) into the muscle once as needed for anaphylaxis    magnesium oxide (MAG-OX) 400 MG tablet TAKE ONE-HALF TABLET BY MOUTH TWICE A DAY    SYNTHROID 125 MCG tablet MON to SAT 1 tablet/day; SUN 0.5 tablet     No current facility-administered medications for this visit.     Allergies   Allergen Reactions    Bees     Chicken Allergy Swelling    Meat Extract Swelling    No Known Drug Allergy        ROS:  12 point review of systems negative other than symptoms noted below or in the HPI.        OBJECTIVE:     LMP  (LMP Unknown)   There is no height or weight on file to calculate BMI.    Exam:  Deferred    In-Clinic Test Results:  No results found for this or any previous visit (from the past 24 hour(s)).    ASSESSMENT/PLAN:                                                      Postmenopausal bleeding    Patient Instructions                                                       If you have any questions regarding your visit, Please contact your care team.     [facility]na GaxiolaJohnson [facility] HospitalAbbeyPost Services: [phone]    To Schedule an Appointment 24/7  Call: [phone] Memorial Hospital and Home HOURS TELEPHONE NUMBER     Rajiv Wilburn MD    Medical Assistant    George Sales-Surgery Scheduler  Julieth-Surgery Scheduler     Monday-[place]  1:00 pm-4:30 pm    Tuesday-[place]  7:30 am-4:30 pm    [facility]  7:[facility]4:30 [facility]        Typical Surgery Days: Monday or Thursday       [address] [address]  [phone] appointment line  [phone] Fax    Imaging Scheduling all locations  [phone]    **Surgeries** Our Surgery Schedulers will contact you to schedule. If you do not receive a call within 3 business days, please call [phone].    If you need a medication refill, please contact your pharmacy. Please allow 3 business days for your refill to be completed.    As always, Thank you for trusting us with your healthcare needs!                   see additional instructions from your care team below          Rajiv Wilburn MD  [facility]

## 2023-08-18 NOTE — PATIENT INSTRUCTIONS
If you have any questions regarding your visit, Please contact your care team.     [employer]: [phone]    To Schedule an Appointment 24/7  Call: [phone] HOURS TELEPHONE NUMBER     Rajiv Wilburn MD    Medical Assistant    George Sales-Surgery Scheduler  Julieth-Surgery Scheduler     Monday-[place]  1:00 pm-4:30 pm    Tuesday-[place]  7:30 am-4:30 pm    Wednesday-[place]  7:30 am-4:30 pm        Typical Surgery Days: Monday or Thursday       [address] [address]  [phone] appointment line  [phone] Fax    Imaging Scheduling all locations  [phone]    **Surgeries** Our Surgery Schedulers will contact you to schedule. If you do not receive a call within 3 business days, please call [phone].    If you need a medication refill, please contact your pharmacy. Please allow 3 business days for your refill to be completed.    As always, Thank you for trusting us with your healthcare needs!                   see additional instructions from your care team below

## 2023-08-21 ENCOUNTER — HOSPITAL ENCOUNTER (OUTPATIENT)
Dept: MAMMOGRAPHY | Facility: CLINIC | Age: 58
Discharge: HOME | End: 2023-08-21
Attending: FAMILY MEDICINE | Admitting: FAMILY MEDICINE
Payer: COMMERCIAL

## 2023-08-21 DIAGNOSIS — Z12.31 ENCOUNTER FOR SCREENING MAMMOGRAM FOR MALIGNANT NEOPLASM OF BREAST: ICD-10-CM

## 2023-08-21 PROCEDURE — 77067 SCR MAMMO BI INCL CAD: CPT

## 2023-08-22 ENCOUNTER — OFFICE VISIT (OUTPATIENT)
Dept: OBGYN | Facility: OTHER | Age: 58
End: 2023-08-22
Payer: COMMERCIAL

## 2023-08-22 ENCOUNTER — TELEPHONE (OUTPATIENT)
Dept: OBGYN | Facility: OTHER | Age: 58
End: 2023-08-22

## 2023-08-22 VITALS
DIASTOLIC BLOOD PRESSURE: 70 MMHG | BODY MASS INDEX: 30.96 KG/M2 | SYSTOLIC BLOOD PRESSURE: 110 MMHG | WEIGHT: 191.8 LBS | HEART RATE: 60 BPM

## 2023-08-22 DIAGNOSIS — N95.0 POST-MENOPAUSAL BLEEDING: Primary | ICD-10-CM

## 2023-08-22 PROCEDURE — 99213 OFFICE O/P EST LOW 20 MIN: CPT | Performed by: OBSTETRICS & GYNECOLOGY

## 2023-08-22 RX ORDER — ACETAMINOPHEN 325 MG/1
975 TABLET ORAL ONCE
Status: CANCELLED | OUTPATIENT
Start: 2023-08-22 | End: 2023-08-22

## 2023-08-22 NOTE — TELEPHONE ENCOUNTER
Associated Diagnoses    Post-menopausal bleeding [N95.0]  - Primary        Source Order Set    Order Set Name Order ID    [id number]     Case Request: Case Info    Panel 1    Providers    Provider Role Service   Rajiv Wilburn MD Primary      Procedures    Procedure Laterality Anesthesia Region   HYSTEROSCOPY, DIAGNOSTIC, WITH DILATION AND CURETTAGE OF UTERUS N/A MAC Vagina                  Requested date:   Location:  OR   Patient class: Outpatient      Pre-op diagnoses: Post-menopausal bleeding     Scheduling Instructions    Additional Instructions for the Case  Surgical Assistant: no  Multi Surgeon Case:  No  H&P:  Pre-op options: Pre-op options: PCP  Post-op:  2 weeks  Vendor: No  Surgical time needed: Average  ERAS patient: No    Sterilization consent needed:  NO  Pre-surgery consult needed:  Not applicable.   SURGERY SCHEDULING AND PRECERTIFICATION    Medical Record Number: [id number]  Radha Mcdermott  YOB: 1965   Phone: [phone] (home) [phone] (work)  Primary Provider: Jenna Rey    Reason for Admit:  ICD-10 CODE:  N95.0    Surgeon: Rajiv Wilburn MD  Surgical Procedure: HYSTEROSCOPY, DIAGNOSTIC, WITH DILATION AND CURETTAGE OF UTERUS    Date of Surgery 09/11 Time of Surgery 10:30am  Surgery [facility] performed at[facility]  [facility]  Status: Outpatient  Type of Anesthesia Anticipated: MAC    Sterilization consent:  Not applicable to procedure being performed.    Pre-Op: On 09/07 with Dr Sanchez at [place]  COVID testing:  Per Provider's discretion Covid testing is not indicated.     Post-Op:  2 weeks on 09/26 with Dr Wilburn at [place]    Pre-certification routed to [employer]:  Auto routes via Case Request    Surgery packet mailed to patient's home address: Yes  Patient instructed NPO 12 hours prior to surgery, arrive according to the time the nurse gives patient when called prior to surgery, must have a .  Patient understood and agrees to  the plan.      Requestor:  Ngozi Kim     Location:  [facility]'s [phone]

## 2023-09-06 ENCOUNTER — MYC MEDICAL ADVICE (OUTPATIENT)
Dept: OBGYN | Facility: OTHER | Age: 58
End: 2023-09-06
Payer: COMMERCIAL

## 2023-09-07 ENCOUNTER — OFFICE VISIT (OUTPATIENT)
Dept: FAMILY MEDICINE | Facility: OTHER | Age: 58
End: 2023-09-07
Payer: COMMERCIAL

## 2023-09-07 VITALS
RESPIRATION RATE: 16 BRPM | TEMPERATURE: 96.9 F | BODY MASS INDEX: 32.15 KG/M2 | DIASTOLIC BLOOD PRESSURE: 70 MMHG | WEIGHT: 193 LBS | HEART RATE: 59 BPM | HEIGHT: 65 IN | OXYGEN SATURATION: 97 % | SYSTOLIC BLOOD PRESSURE: 100 MMHG

## 2023-09-07 DIAGNOSIS — C73 PAPILLARY THYROID CARCINOMA (H): ICD-10-CM

## 2023-09-07 DIAGNOSIS — R93.89 ENDOMETRIAL THICKENING ON ULTRASOUND: ICD-10-CM

## 2023-09-07 DIAGNOSIS — Z23 NEED FOR VACCINATION: ICD-10-CM

## 2023-09-07 DIAGNOSIS — E66.811 CLASS 1 OBESITY WITH SERIOUS COMORBIDITY AND BODY MASS INDEX (BMI) OF 32.0 TO 32.9 IN ADULT, UNSPECIFIED OBESITY TYPE: ICD-10-CM

## 2023-09-07 DIAGNOSIS — N95.0 POSTMENOPAUSAL BLEEDING: ICD-10-CM

## 2023-09-07 DIAGNOSIS — Z01.818 PREOP GENERAL PHYSICAL EXAM: Primary | ICD-10-CM

## 2023-09-07 DIAGNOSIS — E89.2 POST-SURGICAL HYPOPARATHYROIDISM (H): ICD-10-CM

## 2023-09-07 DIAGNOSIS — E89.0 POSTSURGICAL HYPOTHYROIDISM: ICD-10-CM

## 2023-09-07 PROBLEM — I20.0 UNSTABLE ANGINA (H): Status: RESOLVED | Noted: 2018-12-10 | Resolved: 2023-09-07

## 2023-09-07 LAB
T4 FREE SERPL-MCNC: 1.95 NG/DL (ref 0.9–1.7)
TSH SERPL DL<=0.005 MIU/L-ACNC: 0.02 UIU/ML (ref 0.3–4.2)

## 2023-09-07 PROCEDURE — 90715 TDAP VACCINE 7 YRS/> IM: CPT | Performed by: FAMILY MEDICINE

## 2023-09-07 PROCEDURE — 99214 OFFICE O/P EST MOD 30 MIN: CPT | Mod: 25 | Performed by: FAMILY MEDICINE

## 2023-09-07 PROCEDURE — 90471 IMMUNIZATION ADMIN: CPT | Performed by: FAMILY MEDICINE

## 2023-09-07 PROCEDURE — 82306 VITAMIN D 25 HYDROXY: CPT | Performed by: FAMILY MEDICINE

## 2023-09-07 PROCEDURE — 36415 COLL VENOUS BLD VENIPUNCTURE: CPT | Performed by: FAMILY MEDICINE

## 2023-09-07 PROCEDURE — 84439 ASSAY OF FREE THYROXINE: CPT | Performed by: FAMILY MEDICINE

## 2023-09-07 PROCEDURE — 84443 ASSAY THYROID STIM HORMONE: CPT | Performed by: FAMILY MEDICINE

## 2023-09-07 ASSESSMENT — ANXIETY QUESTIONNAIRES
1. FEELING NERVOUS, ANXIOUS, OR ON EDGE: NOT AT ALL
5. BEING SO RESTLESS THAT IT IS HARD TO SIT STILL: NOT AT ALL
4. TROUBLE RELAXING: NOT AT ALL
GAD7 TOTAL SCORE: 0
2. NOT BEING ABLE TO STOP OR CONTROL WORRYING: NOT AT ALL
6. BECOMING EASILY ANNOYED OR IRRITABLE: NOT AT ALL
GAD7 TOTAL SCORE: 0
IF YOU CHECKED OFF ANY PROBLEMS ON THIS QUESTIONNAIRE, HOW DIFFICULT HAVE THESE PROBLEMS MADE IT FOR YOU TO DO YOUR WORK, TAKE CARE OF THINGS AT HOME, OR GET ALONG WITH OTHER PEOPLE: NOT DIFFICULT AT ALL
3. WORRYING TOO MUCH ABOUT DIFFERENT THINGS: NOT AT ALL
7. FEELING AFRAID AS IF SOMETHING AWFUL MIGHT HAPPEN: NOT AT ALL

## 2023-09-07 ASSESSMENT — PAIN SCALES - GENERAL: PAINLEVEL: NO PAIN (0)

## 2023-09-07 ASSESSMENT — PATIENT HEALTH QUESTIONNAIRE - PHQ9
10. IF YOU CHECKED OFF ANY PROBLEMS, HOW DIFFICULT HAVE THESE PROBLEMS MADE IT FOR YOU TO DO YOUR WORK, TAKE CARE OF THINGS AT HOME, OR GET ALONG WITH OTHER PEOPLE: NOT DIFFICULT AT ALL
SUM OF ALL RESPONSES TO PHQ QUESTIONS 1-9: 0
SUM OF ALL RESPONSES TO PHQ QUESTIONS 1-9: 0

## 2023-09-07 NOTE — PATIENT INSTRUCTIONS
For informational purposes only. Not to replace the advice of your health care provider. Copyright   2003,  [facility]. All rights reserved. Clinically reviewed by Sara Devi MD. [employer] [facility] [id number] - REV .  Preparing for Your Surgery  Getting started  A nurse will call you to review your health history and instructions. They will give you an arrival time based on your scheduled surgery time. Please be ready to share:  Your doctor's clinic name and phone number  Your medical, surgical, and anesthesia history  A list of allergies and sensitivities  A list of medicines, including herbal treatments and over-the-counter drugs  Whether the patient has a legal guardian (ask how to send us the papers in advance)  Please tell us if you're pregnant--or if there's any chance you might be pregnant. Some surgeries may injure a fetus (unborn baby), so they require a pregnancy test. Surgeries that are safe for a fetus don't always need a test, and you can choose whether to have one.   If you have a child who's having surgery, please ask for a copy of Preparing for Your Child's Surgery.    Preparing for surgery  Within 10 to 30 days of surgery: Have a pre-op exam (sometimes called an H&P, or History and Physical). This can be done at a clinic or pre-operative center.  If you're having a , you may not need this exam. Talk to your care team.  At your pre-op exam, talk to your care team about all medicines you take. If you need to stop any medicines before surgery, ask when to start taking them again.  We do this for your safety. Many medicines can make you bleed too much during surgery. Some change how well surgery (anesthesia) drugs work.  Call your insurance company to let them know you're having surgery. (If you don't have insurance, call [phone].)  Call your clinic if there's any change in your health. This includes signs of a cold or flu (sore throat, runny nose, cough, rash, fever). It also  includes a scrape or scratch near the surgery site.  If you have questions on the day of surgery, call your hospital or surgery center.  Eating and drinking guidelines  For your safety: Unless your surgeon tells you otherwise, follow the guidelines below.  Eat and drink as usual until 8 hours before you arrive for surgery. After that, no food or milk.  Drink clear liquids until 2 hours before you arrive. These are liquids you can see through, like water, Gatorade, and Propel Water. They also include plain black coffee and tea (no cream or milk), candy, and breath mints. You can spit out gum when you arrive.  If you drink alcohol: Stop drinking it the night before surgery.  If your care team tells you to take medicine on the morning of surgery, it's okay to take it with a sip of water.  Preventing infection  Shower or bathe the night before and morning of your surgery. Follow the instructions your clinic gave you. (If no instructions, use regular soap.)  Don't shave or clip hair near your surgery site. We'll remove the hair if needed.  Don't smoke or vape the morning of surgery. You may chew nicotine gum up to 2 hours before surgery. A nicotine patch is okay.  Note: Some surgeries require you to completely quit smoking and nicotine. Check with your surgeon.  Your care team will make every effort to keep you safe from infection. We will:  Clean our hands often with soap and water (or an alcohol-based hand rub).  Clean the skin at your surgery site with a special soap that kills germs.  Give you a special gown to keep you warm. (Cold raises the risk of infection.)  Wear special hair covers, masks, gowns and gloves during surgery.  Give antibiotic medicine, if prescribed. Not all surgeries need antibiotics.  What to bring on the day of surgery  Photo ID and insurance card  Copy of your health care directive, if you have one  Glasses and hearing aids (bring cases)  You can't wear contacts during surgery  Inhaler and eye  drops, if you use them (tell us about these when you arrive)  CPAP machine or breathing device, if you use them  A few personal items, if spending the night  If you have . . .  A pacemaker, ICD (cardiac defibrillator) or other implant: Bring the ID card.  An implanted stimulator: Bring the remote control.  A legal guardian: Bring a copy of the certified (court-stamped) guardianship papers.  Please remove any jewelry, including body piercings. Leave jewelry and other valuables at home.  If you're going home the day of surgery  You must have a responsible adult drive you home. They should stay with you overnight as well.  If you don't have someone to stay with you, and you aren't safe to go home alone, we may keep you overnight. Insurance often won't pay for this.  After surgery  If it's hard to control your pain or you need more pain medicine, please call your surgeon's office.  Questions?   If you have any questions for your care team, list them here: _________________________________________________________________________________________________________________________________________________________________________ ____________________________________ ____________________________________ ____________________________________    How to Take Your Medication Before Surgery  - Take all of your medications before surgery as usual

## 2023-09-07 NOTE — PROGRESS NOTES
[address]  [facility] [address]  Phone: [phone]  Primary Provider: Jenna Rey  Pre-op Performing Provider: TRICE GENTILE      PREOPERATIVE EVALUATION:  Today's date: 9/7/2023    Radha Mcdermott is a 58 year old female who presents for a preoperative evaluation.      9/7/2023     2:44 PM   Additional Questions   Roomed by britany beasley       Surgical Information:  Surgery/Procedure: HYSTEROSCOPY, DIAGNOSTIC, WITH DILATION AND CURETTAGE OF UTERUS   Surgery Location: [facility]  Surgeon: Dr. Wilburn  Surgery Date: 9/11/23  Time of Surgery: 10:30 am  Where patient plans to recover: At home with family  Fax number for surgical facility: Note does not need to be faxed, will be available electronically in Epic.    Assessment & Plan     The proposed surgical procedure is considered LOW risk.      ICD-10-CM    1. Preop general physical exam  Z01.818       2. Postmenopausal bleeding  N95.0       3. Endometrial thickening on ultrasound  R93.89       4. Post-surgical hypoparathyroidism (H)  E89.2 TSH with free T4 reflex     Vitamin D Deficiency     TSH with free T4 reflex     Vitamin D Deficiency     T4 free      5. Postsurgical hypothyroidism  E89.0 TSH with free T4 reflex     TSH with free T4 reflex     T4 free      6. Class 1 obesity with serious comorbidity and body mass index (BMI) of 32.0 to 32.9 in adult, unspecified obesity type  E66.9     Z68.32       7. Papillary thyroid carcinoma (H)  C73       8. Need for vaccination  Z23 TDAP 10-64Y (ADACEL,BOOSTRIX)                  Risks and Recommendations:  The patient has the following additional risks and recommendations for perioperative complications:   - No identified additional risk factors other than previously addressed    Antiplatelet or Anticoagulation Medication Instructions:   - Patient is on no antiplatelet or anticoagulation medications.    Additional Medication  Instructions:  Patient is to take all scheduled medications on the day of surgery    RECOMMENDATION:  APPROVAL GIVEN to proceed with proposed procedure, without further diagnostic evaluation.    Ordering of each unique test  Prescription drug management        Subjective       HPI related to upcoming procedure: Pt has had ongoing postmenopausal bleeding.  The patient is a 58-year-old female who is here for a preoperative evaluation.    She was having some postmenopausal bleeding unexpectedly.  She has had constant bleeding for about 6 months now.  She got an ultrasound in the first week of 07/2023. She denies any dizziness or lightheadedness from it. She had a workup back in 2018 for suspected heart disease and it was negative. She had thyroid cancer back in 2005 and she appears to be recurrence free.  She has not had a big increase in her bleeding since 06/2023.        9/7/2023     2:39 PM   Preop Questions   1. Have you ever had a heart attack or stroke? No   2. Have you ever had surgery on your heart or blood vessels, such as a stent placement, a coronary artery bypass, or surgery on an artery in your head, neck, heart, or legs? No   3. Do you have chest pain with activity? No   4. Do you have a history of  heart failure? No   5. Do you currently have a cold, bronchitis or symptoms of other infection? No   6. Do you have a cough, shortness of breath, or wheezing? No   7. Do you or anyone in your family have previous history of blood clots? No   8. Do you or does anyone in your family have a serious bleeding problem such as prolonged bleeding following surgeries or cuts? No   9. Have you ever had problems with anemia or been told to take iron pills? No   10. Have you had any abnormal blood loss such as black, tarry or bloody stools, or abnormal vaginal bleeding? No   11. Have you ever had a blood transfusion? No   12. Are you willing to have a blood transfusion if it is medically needed before, during, or after  your surgery? Yes   13. Have you or any of your relatives ever had problems with anesthesia? No   14. Do you have sleep apnea, excessive snoring or daytime drowsiness? No   15. Do you have any artifical heart valves or other implanted medical devices like a pacemaker, defibrillator, or continuous glucose monitor? No   16. Do you have artificial joints? No   17. Are you allergic to latex? No   18. Is there any chance that you may be pregnant? No       Health Care Directive:  Patient does not have a Health Care Directive or Living Will: Patient states has Advance Directive and will bring in a copy to clinic.    Preoperative Review of :   reviewed - no record of controlled substances prescribed.      Status of Chronic Conditions:  See problem list for active medical problems.  Problems all longstanding and stable, except as noted/documented.  See ROS for pertinent symptoms related to these conditions.    HYPOTHYROIDISM - Patient has a longstanding history of chronic Hypothyroidism. Patient has been doing well, noting no tremor, insomnia, hair loss or changes in skin texture. Continues to take medications as directed, without adverse reactions or side effects. Last TSH   Lab Results   Component Value Date    TSH 0.02 (L) 09/07/2023   .      Review of Systems  CONSTITUTIONAL: NEGATIVE for fever, chills, change in weight  INTEGUMENTARY/SKIN: NEGATIVE for worrisome rashes, moles or lesions  EYES: getting glasses  ENT/MOUTH: NEGATIVE for ear, mouth and throat problems  RESP: NEGATIVE for significant cough or SOB  CV: NEGATIVE for chest pain, palpitations or peripheral edema  GI: NEGATIVE for nausea, abdominal pain, heartburn, or change in bowel habits  : NEGATIVE for frequency, dysuria, or hematuria  MUSCULOSKELETAL: NEGATIVE for significant arthralgias or myalgia  NEURO: NEGATIVE for weakness, dizziness or paresthesias  ENDOCRINE: NEGATIVE for temperature intolerance, skin/hair changes  HEME: NEGATIVE for bleeding  problems  PSYCHIATRIC: NEGATIVE for changes in mood or affect    Patient Active Problem List    Diagnosis Date Noted    Health Care Home 08/10/2011     Priority: High     X  DX V65.8 REPLACED WITH 04785 [facility]ALTH CARE HOME (04/08/2013)      Abnormal CT of the chest 12/14/2018     Priority: Medium    Ground glass opacity present on imaging of lung 12/14/2018     Priority: Medium    S/P coronary angiogram 12/10/2018     Priority: Medium    Chest pain 11/29/2018     Priority: Medium    Class 1 obesity with serious comorbidity and body mass index (BMI) of 32.0 to 32.9 in adult, unspecified obesity type 09/25/2018     Priority: Medium    Cystocele, midline 02/09/2017     Priority: Medium    Shoulder joint pain 04/08/2015     Priority: Medium    Abdominal mass 09/24/2013     Priority: Medium    Pulmonary nodules 11/12/2012     Priority: Medium     Stable right pulmonary nodule, likely benign. New left subpleural nodule in 6/12, repeat CT scan in 3/13.        CARDIOVASCULAR SCREENING; LDL GOAL LESS THAN 160 10/31/2010     Priority: Medium    Iron deficiency anemia 05/21/2009     Priority: Medium    Anxiety state 10/16/2007     Priority: Medium     Problem list name updated by automated process. 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 Provider to review      Post-surgical hypoparathyroidism (H) 10/07/2005     Priority: Medium    Papillary thyroid carcinoma (H) 10/07/2005     Priority: Medium    Postsurgical hypothyroidism 10/07/2005     Priority: Medium    Dysmenorrhea      Priority: Medium    Contraceptive management      Priority: Medium     Problem list name updated by automated process. Provider to review        Past Medical History:   Diagnosis Date    Abnormal Papanicolaou smear of cervix and cervical HPV 12/01/2003    ASCUS - repeat paps all ok.     Depressive disorder     Depressive disorder, not elsewhere classified      Dysmenorrhea     Fracture of arm age 14    right    Gestational diabetes     Papillary thyroid carcinoma (H) 10/01/2005    5 cm, MF, + ETE, node+; 3 operations, 131I    Postsurgical hypoparathyroidism (H) 2005    Postsurgical hypothyroidism 2005    Unspecified contraceptive management      Past Surgical History:   Procedure Laterality Date    BIOPSY/REMOVAL, LYMPH NODE(S)  10/27/2009    Surgical resection of lymph nodes, neck.  Scar revision. [facility].    COLONOSCOPY N/A 2023    tubular adenoma, repeat 5 years    HC BIOPSY/EXCISION LYMPH NODE OPEN SUPERFICIAL  2007    removal of some more lymph nodes in the neck.     HC REMOVE TONSILS/ADENOIDS,12+ Y/O  1984    HC THYROIDECTOMY  10/07/2005    Total thyroidectomy.  [facility]. Further lymph node resection in      Current Outpatient Medications   Medication Sig Dispense Refill    calcitRIOL (ROCALTROL) 0.5 MCG capsule TAKE ONE CAPSULE BY MOUTH ONCE DAILY 90 capsule 4    docusate sodium 100 MG tablet Take 100 mg by mouth 2 times daily as needed for constipation 60 tablet 11    magnesium oxide (MAG-OX) 400 MG tablet TAKE ONE-HALF TABLET BY MOUTH TWICE A DAY 90 tablet 4    SYNTHROID 125 MCG tablet MON to SAT 1 tablet/day; SUN 0.5 tablet 90 tablet 4    EPINEPHrine (ANY BX GENERIC EQUIV) 0.3 MG/0.3ML injection 2-pack Inject 0.3 mLs (0.3 mg) into the muscle once as needed for anaphylaxis 2 each 1       Allergies   Allergen Reactions    Bees     Chicken Allergy Swelling    Meat Extract Swelling    No Known Drug Allergy         Social History     Tobacco Use    Smoking status: Former     Packs/day: 0.50     Years: 16.00     Pack years: 8.00     Types: Cigarettes     Quit date: 3/1/1998     Years since quittin.5    Smokeless tobacco: Never    Tobacco comments:     no smoking in the home   Substance Use Topics    Alcohol use: Yes     Comment: social     Family History   Problem Relation Age of Onset    Lipids Mother     Osteoporosis Mother   "   Cancer Father         skin    Dementia Father     Pacemaker Sister         ?a fib    Asthma Brother     Diabetes Maternal Grandmother     Thyroid Disease Maternal Grandmother         thyroid disease    Alzheimer Disease Paternal Grandmother     Heart Disease Paternal Grandmother     Cerebrovascular Disease Paternal Grandmother     Thyroid Disease Paternal Grandmother     Prostate Cancer Paternal Grandfather     Allergies Son         pcn    Cancer Maternal Aunt         breast - mid-30s    Diabetes Cousin     Breast Cancer Cousin      History   Drug Use No         Objective     /70 (BP Location: Right arm, Patient Position: Sitting, Cuff Size: Adult Regular)   Pulse 59   Temp 96.9  F (36.1  C) (Temporal)   Resp 16   Ht 1.65 m (5' 4.96\")   Wt 87.5 kg (193 lb)   LMP  (LMP Unknown)   SpO2 97%   BMI 32.16 kg/m      Physical Exam    GENERAL APPEARANCE: healthy, alert and no distress     EYES: EOMI, PERRL     HENT: ear canals and TM's normal and nose and mouth without ulcers or lesions     NECK: no adenopathy, no asymmetry, masses, or scars and thyroid normal to palpation     RESP: lungs clear to auscultation - no rales, rhonchi or wheezes     CV: regular rates and rhythm, normal S1 S2, no S3 or S4 and no murmur, click or rub     ABDOMEN:  soft, nontender, no HSM or masses and bowel sounds normal     MS: extremities normal- no gross deformities noted, no evidence of inflammation in joints, FROM in all extremities.     SKIN: no suspicious lesions or rashes     NEURO: Normal strength and tone, sensory exam grossly normal, mentation intact and speech normal     PSYCH: mentation appears normal. and affect normal/bright     LYMPHATICS: No cervical adenopathy    Recent Labs   Lab Test 06/09/23  0932 03/15/23  1227   HGB 12.2  --      --      --    POTASSIUM 3.8  --    CR 0.98* 0.95   A1C 5.7*  --         Diagnostics:  Labs pending at this time.  Results will be reviewed when available.  Recent " Results (from the past 720 hour(s))   TSH with free T4 reflex    Collection Time: 09/07/23  3:27 PM   Result Value Ref Range    TSH 0.02 (L) 0.30 - 4.20 uIU/mL   T4 free    Collection Time: 09/07/23  3:27 PM   Result Value Ref Range    Free T4 1.95 (H) 0.90 - 1.70 ng/dL      No EKG required for low risk surgery (cataract, skin procedure, breast biopsy, etc).    Revised Cardiac Risk Index (RCRI):  The patient has the following serious cardiovascular risks for perioperative complications:   - No serious cardiac risks = 0 points     RCRI Interpretation: 0 points: Class I (very low risk - 0.4% complication rate)         Signed Electronically by: Cl Sanchez MD, MD  Copy of this evaluation report is provided to requesting physician.    Answers submitted by the patient for this visit:  Patient Health Questionnaire (Submitted on 9/7/2023)  If you checked off any problems, how difficult have these problems made it for you to do your work, take care of things at home, or get along with other people?: Not difficult at all  PHQ9 TOTAL SCORE: 0  SHINE-7 (Submitted on 9/7/2023)  SHINE 7 TOTAL SCORE: 0

## 2023-09-07 NOTE — PROGRESS NOTES
Prior to immunization administration, verified patients identity using patient s name and date of birth. Please see Immunization Activity for additional information.     Screening Questionnaire for Adult Immunization    Are you sick today?   No   Do you have allergies to medications, food, a vaccine component or latex?   Yes   Have you ever had a serious reaction after receiving a vaccination?   No   Do you have a long-term health problem with heart, lung, kidney, or metabolic disease (e.g., diabetes), asthma, a blood disorder, no spleen, complement component deficiency, a cochlear implant, or a spinal fluid leak?  Are you on long-term aspirin therapy?   No   Do you have cancer, leukemia, HIV/AIDS, or any other immune system problem?   No   Do you have a parent, brother, or sister with an immune system problem?   No   In the past 3 months, have you taken medications that affect  your immune system, such as prednisone, other steroids, or anticancer drugs; drugs for the treatment of rheumatoid arthritis, Crohn s disease, or psoriasis; or have you had radiation treatments?   No   Have you had a seizure, or a brain or other nervous system problem?   No   During the past year, have you received a transfusion of blood or blood    products, or been given immune (gamma) globulin or antiviral drug?   No   For women: Are you pregnant or is there a chance you could become       pregnant during the next month?   No   Have you received any vaccinations in the past 4 weeks?   No     Immunization questionnaire was positive for at least one answer.  Notified provider, per patient provider already discussed all her allergies and this vaccine before I came into the room.      Patient instructed to remain in clinic for 15 minutes afterwards, and to report any adverse reactions.     Screening performed by Hayley Ambrose CMA on 9/7/2023 at 3:27 PM.

## 2023-09-07 NOTE — H&P (VIEW-ONLY)
[address]  [facility] [address]  Phone: [phone]  Primary Provider: Jenna Rey  Pre-op Performing Provider: TRICE GENTILE      PREOPERATIVE EVALUATION:  Today's date: 9/7/2023    Radha Mcdermott is a 58 year old female who presents for a preoperative evaluation.      9/7/2023     2:44 PM   Additional Questions   Roomed by britany beasley       Surgical Information:  Surgery/Procedure: HYSTEROSCOPY, DIAGNOSTIC, WITH DILATION AND CURETTAGE OF UTERUS   Surgery Location: [facility]  Surgeon: Dr. Wilburn  Surgery Date: 9/11/23  Time of Surgery: 10:30 am  Where patient plans to recover: At home with family  Fax number for surgical facility: Note does not need to be faxed, will be available electronically in Epic.    Assessment & Plan     The proposed surgical procedure is considered LOW risk.      ICD-10-CM    1. Preop general physical exam  Z01.818       2. Postmenopausal bleeding  N95.0       3. Endometrial thickening on ultrasound  R93.89       4. Post-surgical hypoparathyroidism (H)  E89.2 TSH with free T4 reflex     Vitamin D Deficiency     TSH with free T4 reflex     Vitamin D Deficiency     T4 free      5. Postsurgical hypothyroidism  E89.0 TSH with free T4 reflex     TSH with free T4 reflex     T4 free      6. Class 1 obesity with serious comorbidity and body mass index (BMI) of 32.0 to 32.9 in adult, unspecified obesity type  E66.9     Z68.32       7. Papillary thyroid carcinoma (H)  C73       8. Need for vaccination  Z23 TDAP 10-64Y (ADACEL,BOOSTRIX)                  Risks and Recommendations:  The patient has the following additional risks and recommendations for perioperative complications:   - No identified additional risk factors other than previously addressed    Antiplatelet or Anticoagulation Medication Instructions:   - Patient is on no antiplatelet or anticoagulation medications.    Additional Medication  Instructions:  Patient is to take all scheduled medications on the day of surgery    RECOMMENDATION:  APPROVAL GIVEN to proceed with proposed procedure, without further diagnostic evaluation.    Ordering of each unique test  Prescription drug management        Subjective       HPI related to upcoming procedure: Pt has had ongoing postmenopausal bleeding.  The patient is a 58-year-old female who is here for a preoperative evaluation.    She was having some postmenopausal bleeding unexpectedly.  She has had constant bleeding for about 6 months now.  She got an ultrasound in the first week of 07/2023. She denies any dizziness or lightheadedness from it. She had a workup back in 2018 for suspected heart disease and it was negative. She had thyroid cancer back in 2005 and she appears to be recurrence free.  She has not had a big increase in her bleeding since 06/2023.        9/7/2023     2:39 PM   Preop Questions   1. Have you ever had a heart attack or stroke? No   2. Have you ever had surgery on your heart or blood vessels, such as a stent placement, a coronary artery bypass, or surgery on an artery in your head, neck, heart, or legs? No   3. Do you have chest pain with activity? No   4. Do you have a history of  heart failure? No   5. Do you currently have a cold, bronchitis or symptoms of other infection? No   6. Do you have a cough, shortness of breath, or wheezing? No   7. Do you or anyone in your family have previous history of blood clots? No   8. Do you or does anyone in your family have a serious bleeding problem such as prolonged bleeding following surgeries or cuts? No   9. Have you ever had problems with anemia or been told to take iron pills? No   10. Have you had any abnormal blood loss such as black, tarry or bloody stools, or abnormal vaginal bleeding? No   11. Have you ever had a blood transfusion? No   12. Are you willing to have a blood transfusion if it is medically needed before, during, or after  your surgery? Yes   13. Have you or any of your relatives ever had problems with anesthesia? No   14. Do you have sleep apnea, excessive snoring or daytime drowsiness? No   15. Do you have any artifical heart valves or other implanted medical devices like a pacemaker, defibrillator, or continuous glucose monitor? No   16. Do you have artificial joints? No   17. Are you allergic to latex? No   18. Is there any chance that you may be pregnant? No       Health Care Directive:  Patient does not have a Health Care Directive or Living Will: Patient states has Advance Directive and will bring in a copy to clinic.    Preoperative Review of :   reviewed - no record of controlled substances prescribed.      Status of Chronic Conditions:  See problem list for active medical problems.  Problems all longstanding and stable, except as noted/documented.  See ROS for pertinent symptoms related to these conditions.    HYPOTHYROIDISM - Patient has a longstanding history of chronic Hypothyroidism. Patient has been doing well, noting no tremor, insomnia, hair loss or changes in skin texture. Continues to take medications as directed, without adverse reactions or side effects. Last TSH   Lab Results   Component Value Date    TSH 0.02 (L) 09/07/2023   .      Review of Systems  CONSTITUTIONAL: NEGATIVE for fever, chills, change in weight  INTEGUMENTARY/SKIN: NEGATIVE for worrisome rashes, moles or lesions  EYES: getting glasses  ENT/MOUTH: NEGATIVE for ear, mouth and throat problems  RESP: NEGATIVE for significant cough or SOB  CV: NEGATIVE for chest pain, palpitations or peripheral edema  GI: NEGATIVE for nausea, abdominal pain, heartburn, or change in bowel habits  : NEGATIVE for frequency, dysuria, or hematuria  MUSCULOSKELETAL: NEGATIVE for significant arthralgias or myalgia  NEURO: NEGATIVE for weakness, dizziness or paresthesias  ENDOCRINE: NEGATIVE for temperature intolerance, skin/hair changes  HEME: NEGATIVE for bleeding  problems  PSYCHIATRIC: NEGATIVE for changes in mood or affect    Patient Active Problem List    Diagnosis Date Noted    Health Care Home 08/10/2011     Priority: High     X  DX V65.8 REPLACED WITH 19153 HEALTH CARE HOME (04/08/2013)      Abnormal CT of the chest 12/14/2018     Priority: Medium    Ground glass opacity present on imaging of lung 12/14/2018     Priority: Medium    S/P coronary angiogram 12/10/2018     Priority: Medium    Chest pain 11/29/2018     Priority: Medium    Class 1 obesity with serious comorbidity and body mass index (BMI) of 32.0 to 32.9 in adult, unspecified obesity type 09/25/2018     Priority: Medium    Cystocele, midline 02/09/2017     Priority: Medium    Shoulder joint pain 04/08/2015     Priority: Medium    Abdominal mass 09/24/2013     Priority: Medium    Pulmonary nodules 11/12/2012     Priority: Medium     Stable right pulmonary nodule, likely benign. New left subpleural nodule in 6/12, repeat CT scan in 3/13.        CARDIOVASCULAR SCREENING; LDL GOAL LESS THAN 160 10/31/2010     Priority: Medium    Iron deficiency anemia 05/21/2009     Priority: Medium    Anxiety state 10/16/2007     Priority: Medium     Problem list name updated by automated process. 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 Provider to review      Post-surgical hypoparathyroidism (H) 10/07/2005     Priority: Medium    Papillary thyroid carcinoma (H) 10/07/2005     Priority: Medium    Postsurgical hypothyroidism 10/07/2005     Priority: Medium    Dysmenorrhea      Priority: Medium    Contraceptive management      Priority: Medium     Problem list name updated by automated process. Provider to review        Past Medical History:   Diagnosis Date    Abnormal Papanicolaou smear of cervix and cervical HPV 12/01/2003    ASCUS - repeat paps all ok.     Depressive disorder     Depressive disorder, not elsewhere classified      Dysmenorrhea     Fracture of arm age 14    right    Gestational diabetes     Papillary thyroid carcinoma (H) 10/01/2005    5 cm, MF, + ETE, node+; 3 operations, 131I    Postsurgical hypoparathyroidism (H) 2005    Postsurgical hypothyroidism 2005    Unspecified contraceptive management      Past Surgical History:   Procedure Laterality Date    BIOPSY/REMOVAL, LYMPH NODE(S)  10/27/2009    Surgical resection of lymph nodes, neck.  Scar revision. [facility].    COLONOSCOPY N/A 2023    tubular adenoma, repeat 5 years    HC BIOPSY/EXCISION LYMPH NODE OPEN SUPERFICIAL  2007    removal of some more lymph nodes in the neck.     HC REMOVE TONSILS/ADENOIDS,12+ Y/O  1984    HC THYROIDECTOMY  10/07/2005    Total thyroidectomy.  F[facility]. Further lymph node resection in      Current Outpatient Medications   Medication Sig Dispense Refill    calcitRIOL (ROCALTROL) 0.5 MCG capsule TAKE ONE CAPSULE BY MOUTH ONCE DAILY 90 capsule 4    docusate sodium 100 MG tablet Take 100 mg by mouth 2 times daily as needed for constipation 60 tablet 11    magnesium oxide (MAG-OX) 400 MG tablet TAKE ONE-HALF TABLET BY MOUTH TWICE A DAY 90 tablet 4    SYNTHROID 125 MCG tablet MON to SAT 1 tablet/day; SUN 0.5 tablet 90 tablet 4    EPINEPHrine (ANY BX GENERIC EQUIV) 0.3 MG/0.3ML injection 2-pack Inject 0.3 mLs (0.3 mg) into the muscle once as needed for anaphylaxis 2 each 1       Allergies   Allergen Reactions    Bees     Chicken Allergy Swelling    Meat Extract Swelling    No Known Drug Allergy         Social History     Tobacco Use    Smoking status: Former     Packs/day: 0.50     Years: 16.00     Pack years: 8.00     Types: Cigarettes     Quit date: 3/1/1998     Years since quittin.5    Smokeless tobacco: Never    Tobacco comments:     no smoking in the home   Substance Use Topics    Alcohol use: Yes     Comment: social     Family History   Problem Relation Age of Onset    Lipids Mother     Osteoporosis Mother   "   Cancer Father         skin    Dementia Father     Pacemaker Sister         ?a fib    Asthma Brother     Diabetes Maternal Grandmother     Thyroid Disease Maternal Grandmother         thyroid disease    Alzheimer Disease Paternal Grandmother     Heart Disease Paternal Grandmother     Cerebrovascular Disease Paternal Grandmother     Thyroid Disease Paternal Grandmother     Prostate Cancer Paternal Grandfather     Allergies Son         pcn    Cancer Maternal Aunt         breast - mid-30s    Diabetes Cousin     Breast Cancer Cousin      History   Drug Use No         Objective     /70 (BP Location: Right arm, Patient Position: Sitting, Cuff Size: Adult Regular)   Pulse 59   Temp 96.9  F (36.1  C) (Temporal)   Resp 16   Ht 1.65 m (5' 4.96\")   Wt 87.5 kg (193 lb)   LMP  (LMP Unknown)   SpO2 97%   BMI 32.16 kg/m      Physical Exam    GENERAL APPEARANCE: healthy, alert and no distress     EYES: EOMI, PERRL     HENT: ear canals and TM's normal and nose and mouth without ulcers or lesions     NECK: no adenopathy, no asymmetry, masses, or scars and thyroid normal to palpation     RESP: lungs clear to auscultation - no rales, rhonchi or wheezes     CV: regular rates and rhythm, normal S1 S2, no S3 or S4 and no murmur, click or rub     ABDOMEN:  soft, nontender, no HSM or masses and bowel sounds normal     MS: extremities normal- no gross deformities noted, no evidence of inflammation in joints, FROM in all extremities.     SKIN: no suspicious lesions or rashes     NEURO: Normal strength and tone, sensory exam grossly normal, mentation intact and speech normal     PSYCH: mentation appears normal. and affect normal/bright     LYMPHATICS: No cervical adenopathy    Recent Labs   Lab Test 06/09/23  0932 03/15/23  1227   HGB 12.2  --      --      --    POTASSIUM 3.8  --    CR 0.98* 0.95   A1C 5.7*  --         Diagnostics:  Labs pending at this time.  Results will be reviewed when available.  Recent " Results (from the past 720 hour(s))   TSH with free T4 reflex    Collection Time: 09/07/23  3:27 PM   Result Value Ref Range    TSH 0.02 (L) 0.30 - 4.20 uIU/mL   T4 free    Collection Time: 09/07/23  3:27 PM   Result Value Ref Range    Free T4 1.95 (H) 0.90 - 1.70 ng/dL      No EKG required for low risk surgery (cataract, skin procedure, breast biopsy, etc).    Revised Cardiac Risk Index (RCRI):  The patient has the following serious cardiovascular risks for perioperative complications:   - No serious cardiac risks = 0 points     RCRI Interpretation: 0 points: Class I (very low risk - 0.4% complication rate)         Signed Electronically by: Cl Sanchez MD, MD  Copy of this evaluation report is provided to requesting physician.    Answers submitted by the patient for this visit:  Patient Health Questionnaire (Submitted on 9/7/2023)  If you checked off any problems, how difficult have these problems made it for you to do your work, take care of things at home, or get along with other people?: Not difficult at all  PHQ9 TOTAL SCORE: 0  SHINE-7 (Submitted on 9/7/2023)  SHINE 7 TOTAL SCORE: 0

## 2023-09-08 ENCOUNTER — HOSPITAL ENCOUNTER (OUTPATIENT)
Dept: ULTRASOUND IMAGING | Facility: CLINIC | Age: 58
Discharge: HOME | End: 2023-09-08
Attending: FAMILY MEDICINE
Payer: COMMERCIAL

## 2023-09-08 DIAGNOSIS — C73 PAPILLARY THYROID CARCINOMA (H): ICD-10-CM

## 2023-09-08 DIAGNOSIS — E89.0 POSTSURGICAL HYPOTHYROIDISM: Primary | ICD-10-CM

## 2023-09-08 DIAGNOSIS — R92.8 ABNORMAL MAMMOGRAM: ICD-10-CM

## 2023-09-08 LAB — DEPRECATED CALCIDIOL+CALCIFEROL SERPL-MC: 35 UG/L (ref 20–75)

## 2023-09-08 PROCEDURE — 76642 ULTRASOUND BREAST LIMITED: CPT | Mod: RT

## 2023-09-08 RX ORDER — LEVOTHYROXINE SODIUM 125 MCG
125 TABLET ORAL DAILY
Qty: 90 TABLET | Refills: 4 | Status: SHIPPED | OUTPATIENT
Start: 2023-09-08 | End: 2023-09-25

## 2023-09-08 NOTE — LETTER
Radha Mcdermott  [address] [facility] [address]        September 8, 2023    Date of Exam:     Dear Radha:    Thank you for your recent visit.     Breast Imaging Result: We are pleased to inform you that the results of your recent breast imaging show no evidence of malignancy (cancer).    Breast Density: Your breast imaging shows that you have dense breast tissue. This means you have slightly more risk of getting breast cancer. It also means your breast imaging will be harder to read. While it's harder to read, it's still important to do breast imaging. In fact, yearly breast imaging is even more important for anyone at higher risk. You may need to do more testing if you have enough risk.    If you are having any problems such as a lump or pain in your breast, please discuss this with your health care team if you haven't already. You may need more testing.    As you know, it's important to find cancer early. Not all cancers are found through breast imaging, but it's the most accurate method for finding cancer early. A complete check should include breast imaging, physical exams, and breast self-exams. The American College of Radiology and the Society of Breast Imaging advises that yearly breast imaging should start at age 40.    A report of your breast imaging results was sent to: Bud Guillory    Your breast imaging will become part of your medical file here at [facility] for at least 10 years. You are responsible for telling any new health care team or breast imaging site of the date and place of this exam.     We appreciate the opportunity to participate in your health care.    Sincerely,  Dr. Young VID[facility]

## 2023-09-08 NOTE — RESULT ENCOUNTER NOTE
Radha, there are simple cysts in the breast, nothing suspicious for cancer. I'm very happy to see this.   Jenna Rey MD

## 2023-09-08 NOTE — RESULT ENCOUNTER NOTE
Radha,    Vitamin D level is low normal.  Current dosing should be okay.    Have a nice day!    Dr. Sanchez

## 2023-09-08 NOTE — RESULT ENCOUNTER NOTE
Radha,    Your thyroid is still over-replaced.  I would recommend reducing your thyroid dose further and recheck in 4-6 weeks.    Thanks,    Dr. Sanchez

## 2023-09-08 NOTE — RESULT ENCOUNTER NOTE
Radha, I don't know if you are able to see this report but everything on your heart monitor was normal.   Jenna Rey MD

## 2023-09-09 ENCOUNTER — ANESTHESIA EVENT (OUTPATIENT)
Dept: SURGERY | Facility: CLINIC | Age: 58
End: 2023-09-09
Payer: COMMERCIAL

## 2023-09-09 ASSESSMENT — LIFESTYLE VARIABLES: TOBACCO_USE: 1

## 2023-09-09 NOTE — ANESTHESIA PREPROCEDURE EVALUATION
Anesthesia Pre-Procedure Evaluation    Patient: Radha Mcdermott   MRN: [id number] : 1965        Procedure : Procedure(s):  HYSTEROSCOPY, DIAGNOSTIC, WITH DILATION AND CURETTAGE OF UTERUS          Past Medical History:   Diagnosis Date     Abnormal Papanicolaou smear of cervix and cervical HPV 2003    ASCUS - repeat paps all ok.      Depressive disorder      Depressive disorder, not elsewhere classified      Dysmenorrhea      Fracture of arm age 14    right     Gestational diabetes      Papillary thyroid carcinoma (H) 10/01/2005    5 cm, MF, + ETE, node+; 3 operations, 131I     Postsurgical hypoparathyroidism (H) 2005     Postsurgical hypothyroidism 2005     Unspecified contraceptive management       Past Surgical History:   Procedure Laterality Date     BIOPSY/REMOVAL, LYMPH NODE(S)  10/27/2009    Surgical resection of lymph nodes, neck.  Scar revision. [facility].     COLONOSCOPY N/A 2023    tubular adenoma, repeat 5 years     HC BIOPSY/EXCISION LYMPH NODE OPEN SUPERFICIAL  2007    removal of some more lymph nodes in the neck.      HC REMOVE TONSILS/ADENOIDS,12+ Y/O  1984     HC THYROIDECTOMY  10/07/2005    Total thyroidectomy.  F-[facility]. Further lymph node resection in       Allergies   Allergen Reactions     Bees      Chicken Allergy Swelling     Meat Extract Swelling     No Known Drug Allergy       Social History     Tobacco Use     Smoking status: Former     Packs/day: 0.50     Years: 16.00     Pack years: 8.00     Types: Cigarettes     Quit date: 3/1/1998     Years since quittin.5     Smokeless tobacco: Never     Tobacco comments:     no smoking in the home   Substance Use Topics     Alcohol use: Yes     Comment: social      Wt Readings from Last 1 Encounters:   23 87.5 kg (193 lb)        Anesthesia Evaluation   Pt has had prior anesthetic. Type: MAC and General.    No history of anesthetic complications       ROS/MED HX  ENT/Pulmonary: Comment: Quit  [id number]  Ground glass opacity present on imaging of lung  Pulmonary nodules    (+)                tobacco use, Past use,  8  Pack-Year Hx,                     Neurologic:  - neg neurologic ROS     Cardiovascular:     (+)  - -  CAD angina-with excertion.  - -                                 Previous cardiac testing   Echo: Date: 2018 Results:  [facility]  Echocardiography Laboratory  [address] [place] [address]     Name: MELONY LOPEZ  MRN: [id number]  : 1965  Study Date: 2018 02:35 PM  Age: 53 yrs  Gender: Female  [place] [facility] [place]  Reason For Study: Chest Pain  Ordering Physician: MANDI WEEKS  Referring Physician: GARTH CROWDER  Performed By: Clemente Quiles     BSA: 2.0 m2  Height: 66 in  Weight: 191 lb  HR: 75  BP: 116/55 mmHg  _____________________________________________________________________________  __        Procedure  Complete Portable Echo Adult.  _____________________________________________________________________________  __        Interpretation Summary  Left ventricular size and systolic function is normal.The EF is 55-60%. No  diagnostic wall motion abnormality.  Right ventricular function, chamber size, wall motion, and thickness are  normal.  No significant valvular abnormalities.  IVC is normal in size 2.0 cm.  No pericardial effusion is present.  _____________________________________________________________________________  __        Left Ventricle  Left ventricular size is normal. Left ventricular wall thickness is normal.  Left ventricular function is normal.The EF is 55-60%. Left ventricular  diastolic function is indeterminate. Regional wall motion is probably normal.     Right Ventricle  Right ventricular function, chamber size, wall motion, and thickness are  normal.     Atria  Borderline biatrial enlargement. The atrial septum is intact as assessed by  color Doppler .     Mitral Valve  The mitral  valve is normal.        Aortic Valve  Aortic valve is normal in structure and function. The aortic valve is  tricuspid.     Tricuspid Valve  The tricuspid valve is normal. Trace tricuspid insufficiency is present. The  peak velocity of the tricuspid regurgitant jet is not obtainable.     Pulmonic Valve  The pulmonic valve is normal.     Vessels  The thoracic aorta is normal. The pulmonary artery is normal. IVC is normal in  size 2.0 cm.     Pericardium  No pericardial effusion is present.        Compared to Previous Study  Previous study not available for comparison.     Attestation  I have personally viewed the imaging and agree with the interpretation and  report as documented by the fellow, Lee Watson, and/or edited by me.  _____________________________________________________________________________  __     MMode/2D Measurements & Calculations  IVSd: 1.1 cm  LVIDd: 4.8 cm  LVIDs: 3.1 cm  LVPWd: 0.94 cm  FS: 35.5 %  LV mass(C)d: 167.3 grams  LV mass(C)dI: 85.3 grams/m2  Ao root diam: 2.8 cm  LA dimension: 3.8 cm  asc Aorta Diam: 3.0 cm  LA/Ao: 1.4  LVOT diam: 1.8 cm  LVOT area: 2.5 cm2  LA Volume (BP): 66.4 ml     LA Volume Index (BP): 33.9 ml/m2  RWT: 0.39        Doppler Measurements & Calculations  MV E max daisy: 70.8 cm/sec  MV A max daisy: 70.8 cm/sec  MV E/A: 1.0  MV dec slope: 408.0 cm/sec2  E/E' avg: 10.0  Lateral E/e': 7.2  Medial E/e': 12.8     _____________________________________________________________________________  __           Report approved by: Judith Ward 11/29/2018 04:41 PM    Stress Test:  Date: Results:    ECG Reviewed:  Date: 6/19/23 Results:  Holter- SR, normal results, hd a 4 beat run of SVT, min HR 47, max   Cath:  Date: Results:      METS/Exercise Tolerance:     Hematologic:  - neg hematologic  ROS     Musculoskeletal:  - neg musculoskeletal ROS     GI/Hepatic:  - neg GI/hepatic ROS     Renal/Genitourinary:  - neg Renal ROS     Endo:     (+)          thyroid problem,  hypothyroidism,    Obesity,       Psychiatric/Substance Use:     (+) psychiatric history anxiety and depression       Infectious Disease:       Malignancy:   (+) Malignancy, History of Other.Other CA Papillary thyroid carcinoma Remission status post Surgery.    Other:  - neg other ROS        Physical Exam    Airway  airway exam normal      Mallampati: II   TM distance: > 3 FB   Neck ROM: full   Mouth opening: > 3 cm    Respiratory Devices and Support         Dental       (+) Minor Abnormalities - some fillings, tiny chips      Cardiovascular   cardiovascular exam normal       Rhythm and rate: regular and normal     Pulmonary   pulmonary exam normal        breath sounds clear to auscultation       OUTSIDE LABS:  CBC:   Lab Results   Component Value Date    WBC 5.0 06/09/2023    WBC 5.5 11/30/2018    HGB 12.2 06/09/2023    HGB 12.7 11/30/2018    HCT 37.6 06/09/2023    HCT 39.4 11/30/2018     06/09/2023     11/30/2018     BMP:   Lab Results   Component Value Date     06/09/2023     11/29/2018    POTASSIUM 3.8 06/09/2023    POTASSIUM 4.2 10/28/2020    CHLORIDE 103 06/09/2023    CHLORIDE 108 11/29/2018    CO2 28 06/09/2023    CO2 27 11/29/2018    BUN 15.2 06/09/2023    BUN 17 11/29/2018    CR 0.98 (H) 06/09/2023    CR 0.95 03/15/2023     (H) 06/09/2023    GLC 92 11/29/2018     COAGS:   Lab Results   Component Value Date    PTT 30 11/22/2006    INR 1.03 11/22/2006     POC:   Lab Results   Component Value Date    BGM 94 08/17/2010    HCG Negative 12/22/2006     HEPATIC:   Lab Results   Component Value Date    ALBUMIN 3.8 11/29/2018    PROTTOTAL 7.9 11/29/2018    ALT 29 11/29/2018    AST 17 11/29/2018    ALKPHOS 71 11/29/2018    BILITOTAL 0.3 11/29/2018     OTHER:   Lab Results   Component Value Date    PH 7.0 12/05/2002    A1C 5.7 (H) 06/09/2023    PACO 8.8 06/09/2023    PHOS 4.6 (H) 03/15/2023    MAG 2.1 11/29/2018    LIPASE 189 11/29/2018    TSH 0.02 (L) 09/07/2023    T4 1.95 (H)  09/07/2023    T3 129 03/22/2010       Anesthesia Plan    ASA Status:  2    NPO Status:  NPO Appropriate    Anesthesia Type: MAC.      Maintenance: TIVA.        Consents    Anesthesia Plan(s) and associated risks, benefits, and realistic alternatives discussed. Questions answered and patient/representative(s) expressed understanding.     - Discussed:     - Discussed with:  Patient       Use of blood products discussed: No .     Postoperative Care    Pain management: IV analgesics, Oral pain medications.   PONV prophylaxis: Ondansetron (or other 5HT-3), Dexamethasone or Solumedrol     Comments:    Other Comments: The risks and benefits of anesthesia, and the alternatives where applicable, have been discussed with the patient, and they wish to proceed.          WANDA Garcia CRNA

## 2023-09-11 ENCOUNTER — HOSPITAL ENCOUNTER (OUTPATIENT)
Facility: CLINIC | Age: 58
Discharge: HOME | End: 2023-09-11
Attending: OBSTETRICS & GYNECOLOGY | Admitting: OBSTETRICS & GYNECOLOGY
Payer: COMMERCIAL

## 2023-09-11 ENCOUNTER — ANESTHESIA (OUTPATIENT)
Dept: SURGERY | Facility: CLINIC | Age: 58
End: 2023-09-11
Payer: COMMERCIAL

## 2023-09-11 VITALS
WEIGHT: 193 LBS | TEMPERATURE: 98.3 F | BODY MASS INDEX: 32.15 KG/M2 | SYSTOLIC BLOOD PRESSURE: 113 MMHG | HEIGHT: 65 IN | RESPIRATION RATE: 16 BRPM | OXYGEN SATURATION: 97 % | DIASTOLIC BLOOD PRESSURE: 55 MMHG | HEART RATE: 57 BPM

## 2023-09-11 DIAGNOSIS — G89.18 POST-OP PAIN: Primary | ICD-10-CM

## 2023-09-11 LAB
ABO/RH(D): NORMAL
ANTIBODY SCREEN: NEGATIVE
HCG UR QL: NEGATIVE
HGB BLD-MCNC: 12.9 G/DL (ref 11.7–15.7)
SPECIMEN EXPIRATION DATE: NORMAL

## 2023-09-11 PROCEDURE — 58558 HYSTEROSCOPY BIOPSY: CPT | Performed by: OBSTETRICS & GYNECOLOGY

## 2023-09-11 PROCEDURE — 370N000017 HC ANESTHESIA TECHNICAL FEE, PER MIN: Performed by: OBSTETRICS & GYNECOLOGY

## 2023-09-11 PROCEDURE — 258N000003 HC RX IP 258 OP 636: Performed by: NURSE ANESTHETIST, CERTIFIED REGISTERED

## 2023-09-11 PROCEDURE — 250N000009 HC RX 250: Performed by: NURSE ANESTHETIST, CERTIFIED REGISTERED

## 2023-09-11 PROCEDURE — 272N000001 HC OR GENERAL SUPPLY STERILE: Performed by: OBSTETRICS & GYNECOLOGY

## 2023-09-11 PROCEDURE — 710N000012 HC RECOVERY PHASE 2, PER MINUTE: Performed by: OBSTETRICS & GYNECOLOGY

## 2023-09-11 PROCEDURE — 999N000141 HC STATISTIC PRE-PROCEDURE NURSING ASSESSMENT: Performed by: OBSTETRICS & GYNECOLOGY

## 2023-09-11 PROCEDURE — 250N000009 HC RX 250: Performed by: OBSTETRICS & GYNECOLOGY

## 2023-09-11 PROCEDURE — 81025 URINE PREGNANCY TEST: CPT | Performed by: OBSTETRICS & GYNECOLOGY

## 2023-09-11 PROCEDURE — 250N000013 HC RX MED GY IP 250 OP 250 PS 637: Performed by: OBSTETRICS & GYNECOLOGY

## 2023-09-11 PROCEDURE — 86900 BLOOD TYPING SEROLOGIC ABO: CPT | Performed by: OBSTETRICS & GYNECOLOGY

## 2023-09-11 PROCEDURE — 250N000011 HC RX IP 250 OP 636: Performed by: NURSE ANESTHETIST, CERTIFIED REGISTERED

## 2023-09-11 PROCEDURE — 360N000076 HC SURGERY LEVEL 3, PER MIN: Performed by: OBSTETRICS & GYNECOLOGY

## 2023-09-11 PROCEDURE — 250N000013 HC RX MED GY IP 250 OP 250 PS 637: Performed by: NURSE ANESTHETIST, CERTIFIED REGISTERED

## 2023-09-11 PROCEDURE — 250N000011 HC RX IP 250 OP 636: Mod: JZ | Performed by: NURSE ANESTHETIST, CERTIFIED REGISTERED

## 2023-09-11 PROCEDURE — 85018 HEMOGLOBIN: CPT | Performed by: OBSTETRICS & GYNECOLOGY

## 2023-09-11 PROCEDURE — 88305 TISSUE EXAM BY PATHOLOGIST: CPT | Mod: TC | Performed by: OBSTETRICS & GYNECOLOGY

## 2023-09-11 RX ORDER — HYDROMORPHONE HYDROCHLORIDE 1 MG/ML
0.5 INJECTION, SOLUTION INTRAMUSCULAR; INTRAVENOUS; SUBCUTANEOUS
Status: COMPLETED | OUTPATIENT
Start: 2023-09-11 | End: 2023-09-11

## 2023-09-11 RX ORDER — BUPIVACAINE HYDROCHLORIDE AND EPINEPHRINE 2.5; 5 MG/ML; UG/ML
INJECTION, SOLUTION INFILTRATION; PERINEURAL PRN
Status: DISCONTINUED | OUTPATIENT
Start: 2023-09-11 | End: 2023-09-11 | Stop reason: HOSPADM

## 2023-09-11 RX ORDER — ACETAMINOPHEN 325 MG/1
975 TABLET ORAL ONCE
Status: CANCELLED | OUTPATIENT
Start: 2023-09-11 | End: 2023-09-11

## 2023-09-11 RX ORDER — LIDOCAINE 40 MG/G
CREAM TOPICAL
Status: DISCONTINUED | OUTPATIENT
Start: 2023-09-11 | End: 2023-09-11 | Stop reason: HOSPADM

## 2023-09-11 RX ORDER — ONDANSETRON 2 MG/ML
4 INJECTION INTRAMUSCULAR; INTRAVENOUS EVERY 30 MIN PRN
Status: DISCONTINUED | OUTPATIENT
Start: 2023-09-11 | End: 2023-09-11 | Stop reason: HOSPADM

## 2023-09-11 RX ORDER — HYDROCODONE BITARTRATE AND ACETAMINOPHEN 5; 325 MG/1; MG/1
1 TABLET ORAL EVERY 6 HOURS PRN
Qty: 5 TABLET | Refills: 0 | Status: SHIPPED | OUTPATIENT
Start: 2023-09-11 | End: 2023-09-14

## 2023-09-11 RX ORDER — HYDROMORPHONE HYDROCHLORIDE 2 MG/1
2 TABLET ORAL
Status: DISCONTINUED | OUTPATIENT
Start: 2023-09-11 | End: 2023-09-11 | Stop reason: HOSPADM

## 2023-09-11 RX ORDER — IBUPROFEN 800 MG/1
800 TABLET, FILM COATED ORAL ONCE
Status: CANCELLED | OUTPATIENT
Start: 2023-09-11 | End: 2023-09-11

## 2023-09-11 RX ORDER — FENTANYL CITRATE 50 UG/ML
50 INJECTION, SOLUTION INTRAMUSCULAR; INTRAVENOUS
Status: DISCONTINUED | OUTPATIENT
Start: 2023-09-11 | End: 2023-09-11 | Stop reason: HOSPADM

## 2023-09-11 RX ORDER — OXYCODONE HYDROCHLORIDE 5 MG/1
10 TABLET ORAL
Status: DISCONTINUED | OUTPATIENT
Start: 2023-09-11 | End: 2023-09-11 | Stop reason: HOSPADM

## 2023-09-11 RX ORDER — PROPOFOL 10 MG/ML
INJECTION, EMULSION INTRAVENOUS CONTINUOUS PRN
Status: DISCONTINUED | OUTPATIENT
Start: 2023-09-11 | End: 2023-09-11

## 2023-09-11 RX ORDER — ACETAMINOPHEN 325 MG/1
975 TABLET ORAL ONCE
Status: COMPLETED | OUTPATIENT
Start: 2023-09-11 | End: 2023-09-11

## 2023-09-11 RX ORDER — DIMENHYDRINATE 50 MG/ML
INJECTION, SOLUTION INTRAMUSCULAR; INTRAVENOUS PRN
Status: DISCONTINUED | OUTPATIENT
Start: 2023-09-11 | End: 2023-09-11

## 2023-09-11 RX ORDER — IBUPROFEN 800 MG/1
800 TABLET, FILM COATED ORAL EVERY 6 HOURS PRN
Qty: 15 TABLET | Refills: 0 | Status: SHIPPED | OUTPATIENT
Start: 2023-09-11 | End: 2024-09-30

## 2023-09-11 RX ORDER — SCOLOPAMINE TRANSDERMAL SYSTEM 1 MG/1
1 PATCH, EXTENDED RELEASE TRANSDERMAL
Status: DISCONTINUED | OUTPATIENT
Start: 2023-09-11 | End: 2023-09-11 | Stop reason: HOSPADM

## 2023-09-11 RX ORDER — ONDANSETRON 4 MG/1
4 TABLET, ORALLY DISINTEGRATING ORAL EVERY 30 MIN PRN
Status: DISCONTINUED | OUTPATIENT
Start: 2023-09-11 | End: 2023-09-11 | Stop reason: HOSPADM

## 2023-09-11 RX ORDER — OXYCODONE HYDROCHLORIDE 5 MG/1
5 TABLET ORAL
Status: COMPLETED | OUTPATIENT
Start: 2023-09-11 | End: 2023-09-11

## 2023-09-11 RX ORDER — SODIUM CHLORIDE, SODIUM LACTATE, POTASSIUM CHLORIDE, CALCIUM CHLORIDE 600; 310; 30; 20 MG/100ML; MG/100ML; MG/100ML; MG/100ML
INJECTION, SOLUTION INTRAVENOUS CONTINUOUS
Status: DISCONTINUED | OUTPATIENT
Start: 2023-09-11 | End: 2023-09-11 | Stop reason: HOSPADM

## 2023-09-11 RX ORDER — PROPOFOL 10 MG/ML
INJECTION, EMULSION INTRAVENOUS PRN
Status: DISCONTINUED | OUTPATIENT
Start: 2023-09-11 | End: 2023-09-11

## 2023-09-11 RX ORDER — LIDOCAINE HYDROCHLORIDE 20 MG/ML
INJECTION, SOLUTION INFILTRATION; PERINEURAL PRN
Status: DISCONTINUED | OUTPATIENT
Start: 2023-09-11 | End: 2023-09-11

## 2023-09-11 RX ORDER — FENTANYL CITRATE 50 UG/ML
INJECTION, SOLUTION INTRAMUSCULAR; INTRAVENOUS PRN
Status: DISCONTINUED | OUTPATIENT
Start: 2023-09-11 | End: 2023-09-11

## 2023-09-11 RX ORDER — ONDANSETRON 2 MG/ML
INJECTION INTRAMUSCULAR; INTRAVENOUS PRN
Status: DISCONTINUED | OUTPATIENT
Start: 2023-09-11 | End: 2023-09-11

## 2023-09-11 RX ORDER — DEXAMETHASONE SODIUM PHOSPHATE 10 MG/ML
INJECTION, SOLUTION INTRAMUSCULAR; INTRAVENOUS PRN
Status: DISCONTINUED | OUTPATIENT
Start: 2023-09-11 | End: 2023-09-11

## 2023-09-11 RX ORDER — KETOROLAC TROMETHAMINE 30 MG/ML
INJECTION, SOLUTION INTRAMUSCULAR; INTRAVENOUS PRN
Status: DISCONTINUED | OUTPATIENT
Start: 2023-09-11 | End: 2023-09-11

## 2023-09-11 RX ORDER — DIMENHYDRINATE 50 MG/ML
25 INJECTION, SOLUTION INTRAMUSCULAR; INTRAVENOUS
Status: DISCONTINUED | OUTPATIENT
Start: 2023-09-11 | End: 2023-09-11 | Stop reason: HOSPADM

## 2023-09-11 RX ADMIN — PROPOFOL 40 MG: 10 INJECTION, EMULSION INTRAVENOUS at 10:31

## 2023-09-11 RX ADMIN — FENTANYL CITRATE 50 MCG: 50 INJECTION, SOLUTION INTRAMUSCULAR; INTRAVENOUS at 11:14

## 2023-09-11 RX ADMIN — HYDROMORPHONE HYDROCHLORIDE 0.5 MG: 1 INJECTION, SOLUTION INTRAMUSCULAR; INTRAVENOUS; SUBCUTANEOUS at 11:41

## 2023-09-11 RX ADMIN — OXYCODONE HYDROCHLORIDE 5 MG: 5 TABLET ORAL at 11:48

## 2023-09-11 RX ADMIN — DEXMEDETOMIDINE HYDROCHLORIDE 6 MCG: 100 INJECTION, SOLUTION INTRAVENOUS at 10:25

## 2023-09-11 RX ADMIN — LIDOCAINE HYDROCHLORIDE 50 MG: 20 INJECTION, SOLUTION INFILTRATION; PERINEURAL at 10:30

## 2023-09-11 RX ADMIN — LIDOCAINE HYDROCHLORIDE 0.1 ML: 10 INJECTION, SOLUTION EPIDURAL; INFILTRATION; INTRACAUDAL; PERINEURAL at 09:48

## 2023-09-11 RX ADMIN — SODIUM CHLORIDE, POTASSIUM CHLORIDE, SODIUM LACTATE AND CALCIUM CHLORIDE 10 ML/HR: 600; 310; 30; 20 INJECTION, SOLUTION INTRAVENOUS at 09:47

## 2023-09-11 RX ADMIN — ACETAMINOPHEN 975 MG: 325 TABLET, FILM COATED ORAL at 09:38

## 2023-09-11 RX ADMIN — ONDANSETRON 4 MG: 2 INJECTION INTRAMUSCULAR; INTRAVENOUS at 10:52

## 2023-09-11 RX ADMIN — DIMENHYDRINATE 15 MG: 50 INJECTION, SOLUTION INTRAMUSCULAR; INTRAVENOUS at 10:30

## 2023-09-11 RX ADMIN — DEXAMETHASONE SODIUM PHOSPHATE 6 MG: 10 INJECTION, SOLUTION INTRAMUSCULAR; INTRAVENOUS at 10:32

## 2023-09-11 RX ADMIN — KETOROLAC TROMETHAMINE 30 MG: 30 INJECTION, SOLUTION INTRAMUSCULAR at 11:08

## 2023-09-11 RX ADMIN — FENTANYL CITRATE 50 MCG: 50 INJECTION, SOLUTION INTRAMUSCULAR; INTRAVENOUS at 10:25

## 2023-09-11 RX ADMIN — MIDAZOLAM 1 MG: 1 INJECTION INTRAMUSCULAR; INTRAVENOUS at 10:25

## 2023-09-11 RX ADMIN — PROPOFOL 150 MCG/KG/MIN: 10 INJECTION, EMULSION INTRAVENOUS at 10:31

## 2023-09-11 ASSESSMENT — ACTIVITIES OF DAILY LIVING (ADL)
ADLS_ACUITY_SCORE: 35

## 2023-09-11 NOTE — ANESTHESIA CARE TRANSFER NOTE
Patient: Radha Mcdermott    Procedure: Procedure(s):  HYSTEROSCOPY, DIAGNOSTIC, WITH DILATION AND CURETTAGE OF UTERUS       Diagnosis: Post-menopausal bleeding [N95.0]  Diagnosis Additional Information: No value filed.    Anesthesia Type:   MAC     Note:    Oropharynx: oropharynx clear of all foreign objects and spontaneously breathing  Level of Consciousness: drowsy  Oxygen Supplementation: room air    Independent Airway: airway patency satisfactory and stable  Dentition: dentition unchanged  Vital Signs Stable: post-procedure vital signs reviewed and stable  Report to RN Given: handoff report given  Patient transferred to: Phase II    Handoff Report: Identifed the Patient, Identified the Reponsible Provider, Reviewed the pertinent medical history, Discussed the surgical course, Reviewed Intra-OP anesthesia mangement and issues during anesthesia, Set expectations for post-procedure period and Allowed opportunity for questions and acknowledgement of understanding  Vitals:  Vitals Value Taken Time   BP     Temp     Pulse     Resp     SpO2         Electronically Signed By: WANDA Cazares CRNA  September 11, 2023  11:31 AM

## 2023-09-11 NOTE — INTERVAL H&P NOTE
"I have reviewed the surgical (or preoperative) H&P that is linked to this encounter, and examined the patient. There are no significant changes    Clinical Conditions Present on Arrival:  Clinically Significant Risk Factors Present on Admission                  # Obesity: Estimated body mass index is 32.16 kg/m  as calculated from the following:    Height as of this encounter: 1.65 m (5' 4.96\").    Weight as of this encounter: 87.5 kg (193 lb).       "

## 2023-09-11 NOTE — OP NOTE
Rajiv Wilburn dictating for Radha Patiño  MR [id number]  Date of birth July 17, 1965  Today's date September 11, 2023    Pre-op diagnosis: Menorrhagia, endometrial thickening on pelvic ultrasound  Postop diagnosis: The same  Procedure: Is hysteroscopy, D&C, polypectomy, application of MyoSure  Surgeon: Dr. Rajiv Wilburn  Assistant: None  Anesthesia: MAC and paracervical block  Estimated blood loss: 15 mL  Specimen: Uterine curettings and endometrial polyps  Complications: None  Indications: Radha is a 58-year-old who had postmenopausal bleeding leading to a pelvic ultrasound, leading to the finding of endometrial thickening and multiple uterine fibroids  Findings: A plethora of endometrial fibroids present    Operative procedures as follows  After adequate MAC anesthesia the patient was prepped and draped in the usual sterile fashion.  Placed in the dorsolithotomy position.  Speculum was placed in the vagina and anterior lip of the cervix anesthetized with 2 cc 1% lidocaine.  A single-tooth tenaculum was then applied to the anterior lip and an endocervical and a paracervical block was done using approximately 20 cc of quarter percent lidocaine.    Uterus was dilated to a size 7 dilator and hysteroscope inserted.  There was inability to see the ostia landmarks as the uterine cavity was full of variety of polyps that extended into the cervical canal.  The hysteroscope was then removed and sharp curettage was then done yielding very little results.    Polyp forceps was then applied and able to retrieve the bulk of the polyps.  Hysteroscope inserted and there still remained at least 1 if not more polyps at the fundus and the decision was made to use the MyoSure.  The MyoSure was then connected and inserted and thorough endometrial curetting was then done.    Good hemostasis.  Instruments removed from the vagina and patient recalled from MAC anesthesia without difficulty.  She will be discharged home and awake alert  tolerating p.o. and voiding and discharge medications include ibuprofen and Norco.  She is given the usual restrictions and precautions.

## 2023-09-11 NOTE — DISCHARGE INSTRUCTIONS
[facility] Same-Day Surgery   Adult Discharge Orders & Instructions     For 24 hours after surgery    Get plenty of rest.  A responsible adult must stay with you for at least 24 hours after you leave the hospital.   Do not drive or use heavy equipment.  If you have weakness or tingling, don't drive or use heavy equipment until this feeling goes away.  Do not drink alcohol.  Avoid strenuous or risky activities.  Ask for help when climbing stairs.   You may feel lightheaded.  If so, sit for a few minutes before standing.  Have someone help you get up.   You may have a slight fever. Call the doctor if your fever is over 100 F (37.7 C) (taken under the tongue) or lasts longer than 24 hours.  You may have a dry mouth, a sore throat, muscle aches or trouble sleeping.  These should go away after 24 hours.  Do not make important or legal decisions.  We don t expect you to have any problems from the surgery or treatment you had today. Just in case, here s what to do if you have pain, upset stomach (nausea), bleeding or infection:  Pain:  Take medicines your doctor has prescribed or over-the-counter medicine they have suggested. Resting and using ice packs can help, too. For surgery on an arm or leg, raise it on a pillow to ease swelling. Call your doctor if these methods don t work.  Copyright Ezekiel Walker, Licensed under [employer]  Upset stomach (nausea):  Take anti-nausea medicine approved by your doctor. Drink clear liquids like apple juice, ginger ale, broth or 7-Up. Be sure to drink enough fluids. Rest can help, too. Move to normal foods when you re ready.   Bleeding:  In the first 24 hours, you may see a little blood on your dressing, about the size of a quarter. You don t need to worry about this much blood, but if the blood spot keeps getting bigger:  Put pressure on the wound if you can, AND  Call your doctor.  Copyright [employer], Licensed under [employer]  Fever/Infection: Please call  your doctor if you have any of these signs:  Redness  Swelling  Wound feels warm  Pain gets worse  Bad-smelling fluid leaks from wound  Fever or chills    Nurse advice line: [phone]

## 2023-09-11 NOTE — LETTER
[facility] OR  [address] [place] [address]  Phone: [phone]      REPORT OF WORK ABILITY      Date: 9/11/2023                     Employee Name: Radha Mcdermott         YOB: 1965    Diagnosis: Surgical Procedure    EMPLOYEE IS ABLE TO WORK: Patient was seen today for surgery, please excuse her from work on Tuesday and Wednesday. She may return to work on Thursday with no restrictions.     Per Written discharge instructions,  Dr. ELODIA Wilburn

## 2023-09-11 NOTE — ANESTHESIA POSTPROCEDURE EVALUATION
Patient: Radha Mcdermott    Procedure: Procedure(s):  HYSTEROSCOPY, DIAGNOSTIC, WITH DILATION AND CURETTAGE OF UTERUS       Anesthesia Type:  MAC    Note:  Disposition: Outpatient   Postop Pain Control: Uneventful            Sign Out: Well controlled pain   PONV: No   Neuro/Psych: Uneventful            Sign Out: Acceptable/Baseline neuro status   Airway/Respiratory: Uneventful            Sign Out: Acceptable/Baseline resp. status   CV/Hemodynamics: Uneventful            Sign Out: Acceptable CV status   Other NRE: NONE   DID A NON-ROUTINE EVENT OCCUR? No    Event details/Postop Comments:  Pt was happy with anesthesia care.  No complications.  I will follow up with the pt if needed.           Last vitals:  Vitals Value Taken Time   /55 09/11/23 1220   Temp     Pulse 57 09/11/23 1220   Resp 16 09/11/23 1230   SpO2 97 % 09/11/23 1230       Electronically Signed By: WANDA Garcia CRNA  September 11, 2023  2:00 PM

## 2023-09-11 NOTE — BRIEF OP NOTE
[facility] [place]    Brief Operative Note    Pre-operative diagnosis: Post-menopausal bleeding [N95.0]  Post-operative diagnosis Same as pre-operative diagnosis    Procedure: Procedure(s):  HYSTEROSCOPY, DIAGNOSTIC, WITH DILATION AND CURETTAGE OF UTERUS  Surgeon: Surgeon(s) and Role:     * Rajiv Wilburn MD - Primary  Anesthesia: MAC   Estimated Blood Loss: 15 mL from 9/11/2023 10:27 AM to 9/11/2023 11:25 AM      Drains: None  Specimens:   ID Type Source Tests Collected by Time Destination   1 : Uterin polyps Tissue Uterus SURGICAL PATHOLOGY EXAM Rajiv Wilburn MD 9/11/2023 10:56 AM      Findings:   None.  Complications: None.  Implants: * No implants in log *

## 2023-09-14 PROCEDURE — 88305 TISSUE EXAM BY PATHOLOGIST: CPT | Mod: 26 | Performed by: PATHOLOGY

## 2023-09-25 ENCOUNTER — VIRTUAL VISIT (OUTPATIENT)
Dept: ENDOCRINOLOGY | Facility: CLINIC | Age: 58
End: 2023-09-25
Payer: COMMERCIAL

## 2023-09-25 VITALS — WEIGHT: 193 LBS | HEIGHT: 66 IN | BODY MASS INDEX: 31.02 KG/M2

## 2023-09-25 DIAGNOSIS — E89.2 POST-SURGICAL HYPOPARATHYROIDISM (H): ICD-10-CM

## 2023-09-25 DIAGNOSIS — C73 PAPILLARY THYROID CARCINOMA (H): ICD-10-CM

## 2023-09-25 DIAGNOSIS — E89.0 POSTSURGICAL HYPOTHYROIDISM: Primary | ICD-10-CM

## 2023-09-25 PROCEDURE — 99214 OFFICE O/P EST MOD 30 MIN: CPT | Mod: VID

## 2023-09-25 RX ORDER — LEVOTHYROXINE SODIUM 125 UG/1
TABLET ORAL
Qty: 90 TABLET | Refills: 4 | COMMUNITY
Start: 2023-09-25 | End: 2023-09-25 | Stop reason: DRUGHIGH

## 2023-09-25 RX ORDER — LEVOTHYROXINE SODIUM 112 UG/1
112 TABLET ORAL DAILY
Qty: 90 TABLET | Refills: 4 | Status: SHIPPED | OUTPATIENT
Start: 2023-09-25 | End: 2024-09-30

## 2023-09-25 NOTE — NURSING NOTE
Is the patient currently in the state of [place]? YES    Visit mode:VIDEO    If the visit is dropped, the patient can be reconnected by: VIDEO VISIT: Text to cell phone:   Telephone Information:   Mobile [phone]       Will anyone else be joining the visit? NO  (If patient encounters technical issues they should call [phone]    How would you like to obtain your AVS? MyChart    Are changes needed to the allergy or medication list? Pt stated no med changes    Reason for visit: RECHECK    Rachel SAUCEDAF

## 2023-09-25 NOTE — PROGRESS NOTES
Endocrinology video visit     1.  Papillary carcinoma thyroid, multifocal, bilateral,  largest tumor left 5 cm, + ETE, multiple node positive. She been treated with total thyroidectomy and left neck dissection (in 3 operations) and also 132 mCi 131.  -   Labs: Tg, TSH, Free T4  Yearly standing orders  See me yearly   Neck US 2025    Addendu,  1/3/2024 Tg 0.19 (concurrent Tg 0.18 11/28/22)  JUAN DIEGO < 0.4,  TSH 0.15, free T4 1.76, Ca 8.4, phos 3.3 creatinine 1.00    Hypothyroidism due to thyroidectomy. As per # 1.  Treat to target TSH < 0.4.  Synthroid  changed to generic  125 x 6.5/week; reduce to generic LT4 112 mcg/day.  Repeat labs 3 months      surgical hypoparathyroidism (partial) since first operation -  On calcitriol 0.5 mcg/day.  She is not taking calcium and she is intolerant of dairy; she takes Mg. In the past she had symptoms on calcitriol 0.25 mcg/day.    Standing orders for labs every 6 months.  24 hour urine calcium     pulmonary nodules, subcm, stable on serial imaging, most recent 3/19. I am noting this as it still may be relevant to # 1    Due to the continued risks of COVID 19 transmission and to improve ease of access this visit was video visit. The patient gave verbal consent for the visit today.    I have independently reviewed and interpreted labs, imaging as indicated  Distant Location (provider location):  Off-site  Mode of Communication:  Video Conference via [employer]  Chart review/prep time 1  [id number]  Visit Start time 1501  Visit Stop time 1511  _25_ minutes spent on the date of the encounter doing chart review, history and exam, documentation and further activities as noted above.    Vero Arevalo MD  Endocrinology & Diabetes.      Cc/ HPI  58  year old seen for post surgical hypothyroidism, post surgical hypoparathyroidism and Papillary thyroid cancer.   She was last seen by me 9/2022.  At that time we reduced the LT4 from 125 to 125 x 6.5/week.  the Rx wsas changed to Synthroid 125  x 6.5/week on 9/22.  It was changed to 125 mcg/day on 9/8/23, which I believe was a pharmacy refill level error we didn't catch.   We added a single tums 500 chewable 11/29/22.  She had a number of labs drawn 9/7/23 but the standing orders for calcium related labs was not drawn despite it being time .      Thyroid cancer has been treated with 3 operations  10/7/05   Thyroidectomy - left sided PCT 5.0 x 3.0 x 2.8 cm, extending through the capsule into parathyroidal soft tissues.  - Microscopic  papillary carcinoma in the right lobe and one LN removed was positive for pCT.  Her post operative course was  complicated by hypoparathyroidism.   132 mCi 131I 11/05.     12/28/06   Left modified neck dissection.  +6/11 LN  10/22/09 selective neck dissection to include level 7 and level 4 on the left.  + metastatic PCT in 4/4  lymph nodes     We have the following tumor marker data:  9/9/05: Tg 290, JUAN DIEGO < 1, TSH   Operation # 1  10/28/05: Tg 131, JUAN DIEGO < 1, TSH 15.12  11/28/05: Tg 116, JUAN DIEGO <1, ,7  2/3/06: Tg 26.7, JUAN DIEGO < 1, TSH 14.43  6/2/06: Tg 2.2, JUAN DIEGO < 1, TSH 0.36  11/3/06: Tg 18, JUAN DIEGO < 1  Operation # 2  1/30/07: Tg 1.1, JUAN DIEGO < 1, TSH   4/17/07: Tg 2, JUAN DIEGO < 1, TSH   12/11/07: Tg 13.9, JUAN DIEGO < 1, TSH 42.02  1/8/08: Tg 2.7, JUAN DIEGO < 1, TSH   3/3/09: Tg 1.0, JUAN DIEGO < 0.4, TSH   4/24/09: LN FNAB needle wash  Tg 4667, JUAN DIEGO < 0.4  Operation # 3  1/6/10: Tg 0.12, JUAN DIEGO < 0.4, TSH < 0.03  10/17/11: Tg 0.3, JUAN DIEGO < 20,   1/20/12: Tg 3.1, JUAN DIEGO < 20  3/21/12: Tg 0.1, JUAN DIEGO < 20, TSH 0.05  4/26/12: Tg 0.1, JUAN DIEGO < 20, TSH 0.02  10/26/12: Tg 3.5, JUAN DIEGO < 20, TSH 0.02 TBS -- supposedly thyrogen was given but the results and dates don't quite fit  4/26/13: Tg 0.2, JUAN DIEGO < 20, TSH 0.01  11/8/13: Tg 3.3, JUAN DIEGO < 20 TBS negative  2/10/14: Tg 0.1, JUAN DIEGO < 20  8/18/14: Tg 0.1, JUAN DIEGO < 20, TSH 0.00  11/21/14: Tg 4 ng/ml (athyrotic < 0.1, intact < 33 Rodriguez lab) JUAN DIEGO < 1.8,TSH 0.01  12/30/14: TSH 0.01  3/4/15; Tg 0.19, JUAN DIEGO < 0.4, TSH  <0.02 ; concurrent remeasure  of 1/6/10 Tg 0.15  9/2/15: Tg 0.11, JUAN DIGEO < 0.4, TSH 0.01  5/26/16: Tg 0.27, JUAN DIEGO < 0.4, TSH 0.55  12/13/16: Tg 0.14, JUAN DIEGO < 0.4, TSH 0.07, free T4 1.32  6/13/17: Tg 0.17, JUAN DIEGO < 0.4, TSH 0.11-   9/24/18: Tg 0.18, JUAN DIEGO < 0.4, TSH 0.14 , free T 4 1.22,   10/10/18 24 hour urine calcium 80 mg/24 hours   2/4/2020: Tg < 0.1, JUAN DIEGO < 0.4, TSH 0.38, free T4 1.52, Ca 8.4, phos 3.6, creatinine 1.09, K 3.5-   10/28/2020 Ca 9, phos 4.6, creatinine 1.01, K 4.2,   4/20/2022 Tg < 0.1, JUAN DIEGO < 0.4,  TSH < 0.01, free T4 1.62, Ca 8.5, creatinine 1.13  11/28/22 Tg 0.1, JUAN DIEGO < 0.4, TSH 0.02, free T4 1.56, Ca 8, phos 3.7, creatinine 0.95   12/30/22 Ca 8.4  3/15/23 Ca 9.2  6/9/23 Ca 8.8, HgbA1c 5.7%  9/7/23 TSh 0.02, free T4 1.95, 25OHD 35     past imaging .    3/4/09 neck US: left level 7 lymph node, ? left level 4  5/27/09 PET/CT negative to my eye  10/26/12: 4 mCi 131I TBS negative  11/6/13: 4 mCi 131I TBS: negative  12/23/14: PET CT without contrast: focal left ovarian avidity of uncertain significance (also seen on outside PET 2010)-- t  3/13/19 chest CT: stable to 2016 11/6/2020 neck US : negative.     ROS     Cardiac; some heart racing; had heart testing for one week .  Respiratory: exercise tolerance not great- wear down easy; feels flu like after cardio exercise but not after weight lifting.    GI:  TUMS causes nausea - ; no constipation; no diarrhea .   : Had hysteroscopy and D& C 2 weeks ago, done for postmenopausal bleeding -- she has recovered since then.   No cancer.   Sometimes cramps in feet and hands, but less than in the past      Past Medical History:   Diagnosis Date    Abnormal Papanicolaou smear of cervix and cervical HPV 12/01/2003    ASCUS - repeat paps all ok.     Depressive disorder     Depressive disorder, not elsewhere classified     Dysmenorrhea     Fracture of arm age 14    right    Gestational diabetes     Papillary thyroid carcinoma (H) 10/01/2005    5 cm, MF, + ETE, node+; 3 operations, 131I    Postsurgical  hypoparathyroidism (H) 01/01/2005    Postsurgical hypothyroidism 01/01/2005    Unspecified contraceptive management      Past Surgical History:   Procedure Laterality Date    BIOPSY/REMOVAL, LYMPH NODE(S)  10/27/2009    Surgical resection of lymph nodes, neck.  Scar revision. [facility] [place].    COLONOSCOPY N/A 06/02/2023    tubular adenoma, repeat 5 years    DILATION AND CURETTAGE, HYSTEROSCOPY DIAGNOSTIC, COMBINED N/A 9/11/2023    Procedure: HYSTEROSCOPY, DIAGNOSTIC, WITH DILATION AND CURETTAGE OF UTERUS;  Surgeon: Rajiv Wilburn MD;  Location:  OR    HC BIOPSY/EXCISION LYMPH NODE OPEN SUPERFICIAL  01/01/2007    removal of some more lymph nodes in the neck.     HC REMOVE TONSILS/ADENOIDS,12+ Y/O  01/01/1984    HC THYROIDECTOMY  10/07/2005    Total thyroidectomy.  [facility]. Further lymph node resection in 12/06     Current Outpatient Medications   Medication Sig Dispense Refill    levothyroxine (SYNTHROID) 125 MCG tablet MON to SAT 1 tablet/day; SUN 0.5 tablet 90 tablet 4    calcitRIOL (ROCALTROL) 0.5 MCG capsule TAKE ONE CAPSULE BY MOUTH ONCE DAILY 90 capsule 4    docusate sodium 100 MG tablet Take 100 mg by mouth 2 times daily as needed for constipation 60 tablet 11    EPINEPHrine (ANY BX GENERIC EQUIV) 0.3 MG/0.3ML injection 2-pack Inject 0.3 mLs (0.3 mg) into the muscle once as needed for anaphylaxis 2 each 1    ibuprofen (ADVIL/MOTRIN) 800 MG tablet Take 1 tablet (800 mg) by mouth every 6 hours as needed for other (mild and/or inflammatory pain) 15 tablet 0    magnesium oxide (MAG-OX) 400 MG tablet TAKE ONE-HALF TABLET BY MOUTH TWICE A DAY 90 tablet 4     TUMS is only sometimes      Family History   Problem Relation Age of Onset    Lipids Mother     Osteoporosis Mother     Cancer Father         skin    Dementia Father     Pacemaker Sister         ?a fib    Asthma Brother     Diabetes Maternal Grandmother     Thyroid Disease Maternal Grandmother         thyroid disease    Alzheimer Disease Paternal Grandmother      Heart Disease Paternal Grandmother     Cerebrovascular Disease Paternal Grandmother     Thyroid Disease Paternal Grandmother     Prostate Cancer Paternal Grandfather     Allergies Son         pcn    Cancer Maternal Aunt         breast - mid-30s    Diabetes Cousin     Breast Cancer Cousin      Social History     Tobacco Use    Smoking status: Former     Packs/day: 0.50     Years: 16.00     Pack years: 8.00     Types: Cigarettes     Quit date: 3/1/1998     Years since quittin.5    Smokeless tobacco: Never    Tobacco comments:     no smoking in the home   Vaping Use    Vaping Use: Never used   Substance Use Topics    Alcohol use: Yes     Comment: social    Drug use: No     GENERAL: no distress;  from mid chest up  SKIN: Visible skin clear. No significant rash, abnormal pigmentation or lesions.  EYES: glasses; Eyes grossly normal to inspection.    NECK: no visible masses;  RESP: No audible wheeze, cough, or  increased work of breathing.    NEURO: Awake, alert, responds appropriately to questions.  Mentation and speech fluent.  PSYCH:affect normal, and appearance well-groomed.     Latest Ref Rng 2020  1:38 PM 2022  11:22 AM 2022  9:27 AM   ENDO THYROID LABS-[facility]       TSH 0.40 - 4.00 mU/L 0.38 (L)  <0.01 (L)  0.02 (L)    TSH 0.30 - 4.20 uIU/mL      Free T3 2.3 - 4.2 pg/mL      Triiodothyronine (T3) 60 - 181 ng/dL      FREE T4 0.90 - 1.70 ng/dL 1.52 (H)  1.62 (H)  1.56 (H)       Latest Ref Rng 2023  3:27 PM   ENDO THYROID LABS-[facility]     TSH 0.40 - 4.00 mU/L    TSH 0.30 - 4.20 uIU/mL 0.02 (L)    Free T3 2.3 - 4.2 pg/mL    Triiodothyronine (T3) 60 - 181 ng/dL    FREE T4 0.90 - 1.70 ng/dL 1.95 (H)          Latest Ref Rng 2022  9:27 AM 2022  9:04 AM   ENDO RESULTS      Calcium 8.6 - 10.0 mg/dL 8.0 (L)  8.4 (L)    Hemoglobin A1C <5.7 %     T3 Free      Triiodothyronine (T3) 60 - 181 ng/dL     T4 Free 0.90 - 1.70 ng/dL 1.56 (H)     Thyroid Peroxidase Antibody <35 IU/mL     Thyroglobulin Antibody  <40 IU/mL     TSH 0.40 - 4.00 mU/L 0.02 (L)     TSH 0.30 - 4.20 uIU/mL     Vitamin D Deficiency screening 20 - 75 ug/L        Latest Ref [facility] 3/15/2023  12:27 PM 6/9/2023  9:32 AM   ENDO RESULTS      Calcium 8.6 - 10.0 mg/dL 9.2  8.8    Hemoglobin A1C <5.7 %  5.7 (H)    T3 Free      Triiodothyronine (T3) 60 - 181 ng/dL     T4 Free 0.90 - 1.70 ng/dL     Thyroid Peroxidase Antibody <35 IU/mL     Thyroglobulin Antibody <40 IU/mL     TSH 0.40 - 4.00 mU/L     TSH 0.30 - 4.20 uIU/mL     Vitamin D Deficiency screening 20 - 75 ug/L        Latest Ref [facility] 9/7/2023  3:27 PM   ENDO RESULTS     Calcium 8.6 - 10.0 mg/dL    Hemoglobin A1C <5.7 %    T3 Free     Triiodothyronine (T3) 60 - 181 ng/dL    T4 Free 0.90 - 1.70 ng/dL 1.95 (H)    Thyroid Peroxidase Antibody <35 IU/mL    Thyroglobulin Antibody <40 IU/mL    TSH 0.40 - 4.00 mU/L    TSH 0.30 - 4.20 uIU/mL 0.02 (L)    Vitamin D Deficiency screening 20 - 75 ug/L 35

## 2023-09-25 NOTE — LETTER
9/25/2023       RE: Radha Mcdermott  [address] [facility] [address]     Dear Colleague,    Thank you for referring your patient, Radha Mcdermott, to the [facility]OCRINOLOG[facility] [place] at [facility]. Please see a copy of my visit note below.    Endocrinology video visit     1.  Papillary carcinoma thyroid, multifocal, bilateral,  largest tumor left 5 cm, + ETE, multiple node positive. She been treated with total thyroidectomy and left neck dissection (in 3 operations) and also 132 mCi 131.  -   Labs: Tg, TSH, Free T4  Yearly standing orders  See me yearly   Neck US 2025    Hypothyroidism due to thyroidectomy. As per # 1.  Treat to target TSH < 0.4.  Synthroid  changed to generic  125 x 6.5/week; reduce to generic LT4 112 mcg/day.  Repeat labs 3 months      surgical hypoparathyroidism (partial) since first operation -  On calcitriol 0.5 mcg/day.  She is not taking calcium and she is intolerant of dairy; she takes Mg. In the past she had symptoms on calcitriol 0.25 mcg/day.    Standing orders for labs every 6 months.  24 hour urine calcium     pulmonary nodules, subcm, stable on serial imaging, most recent 3/19. I am noting this as it still may be relevant to # 1    Due to the continued risks of COVID 19 transmission and to improve ease of access this visit was video visit. The patient gave verbal consent for the visit today.    I have independently reviewed and interpreted labs, imaging as indicated  Distant Location (provider location):  Off-site  Mode of Communication:  Video Conference via [employer]  Chart review/prep time 1  [id number]  Visit Start time 1501  Visit Stop time 1511  _25_ minutes spent on the date of the encounter doing chart review, history and exam, documentation and further activities as noted above.    Vero Arevalo MD  Endocrinology & Diabetes.      Cc/ HPI  58  year old seen for post surgical hypothyroidism, post  surgical hypoparathyroidism and Papillary thyroid cancer.   She was last seen by me 9/2022.  At that time we reduced the LT4 from 125 to 125 x 6.5/week.  the Rx wsas changed to Synthroid 125 x 6.5/week on 9/22.  It was changed to 125 mcg/day on 9/8/23, which I believe was a pharmacy refill level error we didn't catch.   We added a single tums 500 chewable 11/29/22.  She had a number of labs drawn 9/7/23 but the standing orders for calcium related labs was not drawn despite it being time .      Thyroid cancer has been treated with 3 operations  10/7/05   Thyroidectomy - left sided PCT 5.0 x 3.0 x 2.8 cm, extending through the capsule into parathyroidal soft tissues.  - Microscopic  papillary carcinoma in the right lobe and one LN removed was positive for pCT.  Her post operative course was  complicated by hypoparathyroidism.   132 mCi 131I 11/05.     12/28/06   Left modified neck dissection.  +6/11 LN  10/22/09 selective neck dissection to include level 7 and level 4 on the left.  + metastatic PCT in 4/4  lymph nodes     We have the following tumor marker data:  9/9/05: Tg 290, JUAN DIEGO < 1, TSH   Operation # 1  10/28/05: Tg 131, JUAN DIEGO < 1, TSH 15.12  11/28/05: Tg 116, JUAN DIEGO <1, ,7  2/3/06: Tg 26.7, JUAN DIEGO < 1, TSH 14.43  6/2/06: Tg 2.2, JUAN DIEGO < 1, TSH 0.36  11/3/06: Tg 18, JUAN DIEGO < 1  Operation # 2  1/30/07: Tg 1.1, JUAN DIEGO < 1, TSH   4/17/07: Tg 2, JUAN DIEGO < 1, TSH   12/11/07: Tg 13.9, JUAN DIEGO < 1, TSH 42.02  1/8/08: Tg 2.7, JUAN DIEGO < 1, TSH   3/3/09: Tg 1.0, JUAN DIEGO < 0.4, TSH   4/24/09: LN FNAB needle wash  Tg 4667, JUAN DIEGO < 0.4  Operation # 3  1/6/10: Tg 0.12, JUAN DIEGO < 0.4, TSH < 0.03  10/17/11: Tg 0.3, JUAN DIEGO < 20,   1/20/12: Tg 3.1, JUAN DIEGO < 20  3/21/12: Tg 0.1, JUAN DIEGO < 20, TSH 0.05  4/26/12: Tg 0.1, JUAN DIEGO < 20, TSH 0.02  10/26/12: Tg 3.5, JUAN DIEGO < 20, TSH 0.02 TBS -- supposedly thyrogen was given but the results and dates don't quite fit  4/26/13: Tg 0.2, JUAN DIEGO < 20, TSH 0.01  11/8/13: Tg 3.3, JUAN DIEGO < 20 TBS negative  2/10/14: Tg 0.1, JUAN DIEGO < 20  8/18/14:  Tg 0.1, JUAN DIEGO < 20, TSH 0.00  11/21/14: Tg 4 ng/ml (athyrotic < 0.1, intact < 33 Rodriguez lab) JUAN DIEGO < 1.8,TSH 0.01  12/30/14: TSH 0.01  3/4/15; Tg 0.19, JUAN DIEGO < 0.4, TSH  <0.02 ; concurrent remeasure of 1/6/10 Tg 0.15  9/2/15: Tg 0.11, JUAN DIEGO < 0.4, TSH 0.01  5/26/16: Tg 0.27, JUAN DIEGO < 0.4, TSH 0.55  12/13/16: Tg 0.14, JUAN DIEGO < 0.4, TSH 0.07, free T4 1.32  6/13/17: Tg 0.17, JUAN DIEGO < 0.4, TSH 0.11-   9/24/18: Tg 0.18, JUAN DIEGO < 0.4, TSH 0.14 , free T 4 1.22,   10/10/18 24 hour urine calcium 80 mg/24 hours   2/4/2020: Tg < 0.1, JUAN DIEGO < 0.4, TSH 0.38, free T4 1.52, Ca 8.4, phos 3.6, creatinine 1.09, K 3.5-   10/28/2020 Ca 9, phos 4.6, creatinine 1.01, K 4.2,   4/20/2022 Tg < 0.1, JUAN DIEGO < 0.4,  TSH < 0.01, free T4 1.62, Ca 8.5, creatinine 1.13  11/28/22 Tg 0.1, JUAN DIEGO < 0.4, TSH 0.02, free T4 1.56, Ca 8, phos 3.7, creatinine 0.95   12/30/22 Ca 8.4  3/15/23 Ca 9.2  6/9/23 Ca 8.8, HgbA1c 5.7%  9/7/23 TSh 0.02, free T4 1.95, 25OHD 35     past imaging .    3/4/09 neck US: left level 7 lymph node, ? left level 4  5/27/09 PET/CT negative to my eye  10/26/12: 4 mCi 131I TBS negative  11/6/13: 4 mCi 131I TBS: negative  12/23/14: PET CT without contrast: focal left ovarian avidity of uncertain significance (also seen on outside PET 2010)-- t  3/13/19 chest CT: stable to 2016 11/6/2020 neck US : negative.     ROS     Cardiac; some heart racing; had heart testing for one week .  Respiratory: exercise tolerance not great- wear down easy; feels flu like after cardio exercise but not after weight lifting.    GI:  TUMS causes nausea - ; no constipation; no diarrhea .   : Had hysteroscopy and D& C 2 weeks ago, done for postmenopausal bleeding -- she has recovered since then.   No cancer.   Sometimes cramps in feet and hands, but less than in the past      Past Medical History:   Diagnosis Date    Abnormal Papanicolaou smear of cervix and cervical HPV 12/01/2003    ASCUS - repeat paps all ok.     Depressive disorder     Depressive disorder, not elsewhere  classified     Dysmenorrhea     Fracture of arm age 14    right    Gestational diabetes     Papillary thyroid carcinoma (H) 10/01/2005    5 cm, MF, + ETE, node+; 3 operations, 131I    Postsurgical hypoparathyroidism (H) 01/01/2005    Postsurgical hypothyroidism 01/01/2005    Unspecified contraceptive management      Past Surgical History:   Procedure Laterality Date    BIOPSY/REMOVAL, LYMPH NODE(S)  10/27/2009    Surgical resection of lymph nodes, neck.  Scar revision. [facility].    COLONOSCOPY N/A 06/02/2023    tubular adenoma, repeat 5 years    DILATION AND CURETTAGE, HYSTEROSCOPY DIAGNOSTIC, COMBINED N/A 9/11/2023    Procedure: HYSTEROSCOPY, DIAGNOSTIC, WITH DILATION AND CURETTAGE OF UTERUS;  Surgeon: Rajiv Wilburn MD;  Location:  OR     BIOPSY/EXCISION LYMPH NODE OPEN SUPERFICIAL  01/01/2007    removal of some more lymph nodes in the neck.      REMOVE TONSILS/ADENOIDS,12+ Y/O  01/01/1984    HC THYROIDECTOMY  10/07/2005    Total thyroidectomy.  [facility]. Further lymph node resection in 12/06     Current Outpatient Medications   Medication Sig Dispense Refill    levothyroxine (SYNTHROID) 125 MCG tablet MON to SAT 1 tablet/day; SUN 0.5 tablet 90 tablet 4    calcitRIOL (ROCALTROL) 0.5 MCG capsule TAKE ONE CAPSULE BY MOUTH ONCE DAILY 90 capsule 4    docusate sodium 100 MG tablet Take 100 mg by mouth 2 times daily as needed for constipation 60 tablet 11    EPINEPHrine (ANY BX GENERIC EQUIV) 0.3 MG/0.3ML injection 2-pack Inject 0.3 mLs (0.3 mg) into the muscle once as needed for anaphylaxis 2 each 1    ibuprofen (ADVIL/MOTRIN) 800 MG tablet Take 1 tablet (800 mg) by mouth every 6 hours as needed for other (mild and/or inflammatory pain) 15 tablet 0    magnesium oxide (MAG-OX) 400 MG tablet TAKE ONE-HALF TABLET BY MOUTH TWICE A DAY 90 tablet 4     TUMS is only sometimes      Family History   Problem Relation Age of Onset    Lipids Mother     Osteoporosis Mother     Cancer Father         skin    Dementia Father      Pacemaker Sister         ?a fib    Asthma Brother     Diabetes Maternal Grandmother     Thyroid Disease Maternal Grandmother         thyroid disease    Alzheimer Disease Paternal Grandmother     Heart Disease Paternal Grandmother     Cerebrovascular Disease Paternal Grandmother     Thyroid Disease Paternal Grandmother     Prostate Cancer Paternal Grandfather     Allergies Son         pcn    Cancer Maternal Aunt         breast - mid-30s    Diabetes Cousin     Breast Cancer Cousin      Social History     Tobacco Use    Smoking status: Former     Packs/day: 0.50     Years: 16.00     Pack years: 8.00     Types: Cigarettes     Quit date: 3/1/1998     Years since quittin.5    Smokeless tobacco: Never    Tobacco comments:     no smoking in the home   Vaping Use    Vaping Use: Never used   Substance Use Topics    Alcohol use: Yes     Comment: social    Drug use: No     GENERAL: no distress;  from mid chest up  SKIN: Visible skin clear. No significant rash, abnormal pigmentation or lesions.  EYES: glasses; Eyes grossly normal to inspection.    NECK: no visible masses;  RESP: No audible wheeze, cough, or  increased work of breathing.    NEURO: Awake, alert, responds appropriately to questions.  Mentation and speech fluent.  PSYCH:affect normal, and appearance well-groomed.     Latest Ref Rng 2020  1:38 PM 2022  11:22 AM 2022  9:27 AM   ENDO THYROID LABS-[facility]       TSH 0.40 - 4.00 mU/L 0.38 (L)  <0.01 (L)  0.02 (L)    TSH 0.30 - 4.20 uIU/mL      Free T3 2.3 - 4.2 pg/mL      Triiodothyronine (T3) 60 - 181 ng/dL      FREE T4 0.90 - 1.70 ng/dL 1.52 (H)  1.62 (H)  1.56 (H)       Latest Ref Rng 2023  3:27 PM   ENDO THYROID LABS-[facility]     TSH 0.40 - 4.00 mU/L    TSH 0.30 - 4.20 uIU/mL 0.02 (L)    Free T3 2.3 - 4.2 pg/mL    Triiodothyronine (T3) 60 - 181 ng/dL    FREE T4 0.90 - 1.70 ng/dL 1.95 (H)          Latest Ref Rng 2022  9:27 AM 2022  9:04 AM   ENDO RESULTS      Calcium 8.6 - 10.0 mg/dL 8.0  (L)  8.4 (L)    Hemoglobin A1C <5.7 %     T3 Free      Triiodothyronine (T3) 60 - 181 ng/dL     T4 Free 0.90 - 1.70 ng/dL 1.56 (H)     Thyroid Peroxidase Antibody <35 IU/mL     Thyroglobulin Antibody <40 IU/mL     TSH 0.40 - 4.00 mU/L 0.02 (L)     TSH 0.30 - 4.20 uIU/mL     Vitamin D Deficiency screening 20 - 75 ug/L        [facility] Ref [facility] 3/15/2023  12:27 PM 6/9/2023  9:32 AM   ENDO RESULTS      Calcium 8.6 - 10.0 mg/dL 9.2  8.8    Hemoglobin A1C <5.7 %  5.7 (H)    T3 Free      Triiodothyronine (T3) 60 - 181 ng/dL     T4 Free 0.90 - 1.70 ng/dL     Thyroid Peroxidase Antibody <35 IU/mL     Thyroglobulin Antibody <40 IU/mL     TSH 0.40 - 4.00 mU/L     TSH 0.30 - 4.20 uIU/mL     Vitamin D Deficiency screening 20 - 75 ug/L        Latest Ref [facility] 9/7/2023  3:27 PM   ENDO RESULTS     Calcium 8.6 - 10.0 mg/dL    Hemoglobin A1C <5.7 %    T3 Free     Triiodothyronine (T3) 60 - 181 ng/dL    T4 Free 0.90 - 1.70 ng/dL 1.95 (H)    Thyroid Peroxidase Antibody <35 IU/mL    Thyroglobulin Antibody <40 IU/mL    TSH 0.40 - 4.00 mU/L    TSH 0.30 - 4.20 uIU/mL 0.02 (L)    Vitamin D Deficiency screening 20 - 75 ug/L 35           Vero Arevalo MD

## 2023-09-25 NOTE — PATIENT INSTRUCTIONS
Reduce generic levothyroxine to 112 mcg/day.      Labs in late December 2023    Next neck  2025    See me approximately yearly

## 2023-09-26 ENCOUNTER — VIRTUAL VISIT (OUTPATIENT)
Dept: OBGYN | Facility: OTHER | Age: 58
End: 2023-09-26
Payer: COMMERCIAL

## 2023-09-26 DIAGNOSIS — Z98.890 POSTOPERATIVE STATE: Primary | ICD-10-CM

## 2023-09-26 PROCEDURE — 99212 OFFICE O/P EST SF 10 MIN: CPT | Performed by: OBSTETRICS & GYNECOLOGY

## 2023-09-26 NOTE — PROGRESS NOTES
Radha had her post op visit today done by way of a phone visit.  Radha had a hysteroscopy, D and C, polypectomy.  She was able to pull up her chart and we reviewed her path report and I was able to review picture by picture the photos in the media tab    She has some spotting for the first week post op but that has finished  ROS is neg for post op concerns related to having a D and C such as signs of infection    Her path report in benign    We discuss the unlikelihood that she would continue to grow more polyps in the coming years    A/P  Post op state from hysteroscopy.  No significant issue resulting  Follow up PRN

## 2023-10-17 NOTE — PROGRESS NOTES
ENT Consultation    Radha Mcdremott who is a 58 year old female seen in consultation at the request of Dr. Jenna Rey.      History of Present Illness - Radha Mcdermott is a 58 year old female presents with right otalgia that has been ongoing now for the last year with feeling of discomfort symptoms throbbing pain pressure and also feeling of decreased hearing in the right ear.  Denies any tinnitus vertigo or dizziness or otorrhea.  She does grind her teeth from time to time.        BP Readings from Last 1 Encounters:   10/30/23 116/68       BP noted to be well controlled today in office.     Radha IS NOT a smoker/uses chewing tobacco.        Past Medical History -   Past Medical History:   Diagnosis Date    Abnormal Papanicolaou smear of cervix and cervical HPV 12/01/2003    ASCUS - repeat paps all ok.     Depressive disorder     Depressive disorder, not elsewhere classified     Dysmenorrhea     Fracture of arm age 14    right    Gestational diabetes     Papillary thyroid carcinoma (H) 10/01/2005    5 cm, MF, + ETE, node+; 3 operations, 131I    Postsurgical hypoparathyroidism (H24) 01/01/2005    Postsurgical hypothyroidism 01/01/2005    Unspecified contraceptive management        Current Medications -   Current Outpatient Medications:     calcitRIOL (ROCALTROL) 0.5 MCG capsule, TAKE ONE CAPSULE BY MOUTH ONCE DAILY, Disp: 90 capsule, Rfl: 4    docusate sodium 100 MG tablet, Take 100 mg by mouth 2 times daily as needed for constipation, Disp: 60 tablet, Rfl: 11    EPINEPHrine (ANY BX GENERIC EQUIV) 0.3 MG/0.3ML injection 2-pack, Inject 0.3 mLs (0.3 mg) into the muscle once as needed for anaphylaxis, Disp: 2 each, Rfl: 1    levothyroxine (SYNTHROID/LEVOTHROID) 112 MCG tablet, Take 1 tablet (112 mcg) by mouth daily, Disp: 90 tablet, Rfl: 4    magnesium oxide (MAG-OX) 400 MG tablet, TAKE ONE-HALF TABLET BY MOUTH TWICE A DAY, Disp: 90 tablet, Rfl: 4    ibuprofen (ADVIL/MOTRIN) 800 MG tablet, Take 1 tablet (800  mg) by mouth every 6 hours as needed for other (mild and/or inflammatory pain) (Patient not taking: Reported on 10/30/2023), Disp: 15 tablet, Rfl: 0    Allergies -   Allergies   Allergen Reactions    Bees     Chicken Allergy Swelling    Meat Extract Swelling    No Known Drug Allergy        Social History -   Social History     Socioeconomic History    Marital status:      Spouse name: Mushtaq    Number of children: 2    Years of education: 12+   Occupational History    Occupation: Pharm. Tech     Employer: [employer]   Tobacco Use    Smoking status: Former     Packs/day: 0.50     Years: 16.00     Additional pack years: 0.00     Total pack years: 8.00     Types: Cigarettes     Quit date: 3/1/1998     Years since quittin.6    Smokeless tobacco: Never    Tobacco comments:     no smoking in the home   Vaping Use    Vaping Use: Never used   Substance and Sexual Activity    Alcohol use: Yes     Comment: social    Drug use: No    Sexual activity: Yes     Partners: Male     Birth control/protection: None   Other Topics Concern     Service No    Blood Transfusions No    Caffeine Concern No     Comment: occasional    Occupational Exposure No    Hobby Hazards No    Sleep Concern No    Stress Concern No    Weight Concern No    Special Diet No    Back Care No    Exercise Yes    Bike Helmet No    Seat Belt Yes    Self-Exams Yes    Parent/sibling w/ CABG, MI or angioplasty before 65F 55M? No   Social History Narrative    Lives with son.       Family History -   Family History   Problem Relation Age of Onset    Lipids Mother     Osteoporosis Mother     Cancer Father         skin    Dementia Father     Pacemaker Sister         ?a fib    Asthma Brother     Diabetes Maternal Grandmother     Thyroid Disease Maternal Grandmother         thyroid disease    Alzheimer Disease Paternal Grandmother     Heart Disease Paternal Grandmother     Cerebrovascular Disease Paternal Grandmother     Thyroid Disease  "Paternal Grandmother     Prostate Cancer Paternal Grandfather     Allergies Son         pcn    Cancer Maternal Aunt         breast - mid-30s    Diabetes Cousin     Breast Cancer Cousin        Review of Systems - As per HPI and PMHx, otherwise review of system review of the head and neck negative. Otherwise 10+ review of system is negative    Physical Exam  /68 (BP Location: Right arm, Cuff Size: Adult Large)   Pulse 87   Temp 97.5  F (36.4  C) (Temporal)   Ht 1.65 m (5' 4.96\")   Wt 88 kg (194 lb)   LMP  (LMP Unknown)   SpO2 98%   BMI 32.32 kg/m    BMI: Body mass index is 32.32 kg/m .    General - The patient is well nourished and well developed, and appears to have good nutritional status.  Alert and oriented to person and place, answers questions and cooperates with examination appropriately.    SKIN - No suspicious lesions or rashes.  Respiration - No respiratory distress.  Head and Face - Normocephalic and atraumatic, with no gross asymmetry noted of the contour of the facial features.  The facial nerve is intact, with strong symmetric movements.    Voice and Breathing - The patient was breathing comfortably without the use of accessory muscles. The patients voice was clear and strong, and had appropriate pitch and quality.    Ears - Bilateral pinna and EACs with normal appearing overlying skin. Tympanic membrane intact with good mobility on pneumatic otoscopy bilaterally. Bony landmarks of the ossicular chain are normal. The tympanic membranes are normal in appearance. No retraction, perforation, or masses.  No fluid or purulence was seen in the external canal or the middle ear.  Exam was after removal of a large piece of cerumen stuck to the anterior canal wall in the mid canal on the right side.  No pressure tenderness over the TMJ area.    Eyes - Extraocular movements intact.  Sclera were not icteric or injected, conjunctiva were pink and moist.    Mouth - Examination of the oral cavity showed " pink, healthy oral mucosa. No lesions or ulcerations noted.  The tongue was mobile and midline, and the dentition were in good condition.      Throat - The walls of the oropharynx were smooth, pink, moist, symmetric, and had no lesions or ulcerations.  The tonsillar pillars and soft palate were symmetric.  The uvula was midline on elevation.    Neck - Normal midline excursion of the laryngotracheal complex during swallowing.  Full range of motion on passive movement.  Palpation of the occipital, submental, submandibular, internal jugular chain, and supraclavicular nodes did not demonstrate any abnormal lymph nodes or masses.  The carotid pulse was palpable bilaterally.  Palpation of the thyroid was soft and smooth, with no nodules or goiter appreciated.  The trachea was mobile and midline.      Neuro - Nonfocal neuro exam is normal, CN 2 through 12 intact, normal gait and muscle tone.      Performed in clinic today:  Audiologic Studies - An audiogram and tympanogram were performed today as part of the evaluation and personally reviewed. The tympanogram shows normal Type A curves, with normal canal volumes and middle ear pressures.  There is no sign of eustachian tube dysfunction or middle ear effusion.  The audiogram was also normal.  The sensorineural hearing was age-appropriate, with no evidence of conductive hearing loss or significant asymmetry.    Removal of a large piece of cerumen on the right side mid canal.  Under the guidance of magnified speculum I appreciate large chunk of cerumen partially obstructing the canal.  Manipulation of the cerumen caused some reproduced discomfort that patient feels.  It appears to be adherent with dry skin to the anterior canal wall.  It was carefully mobilized with cerumen curette alligator forceps and suction and removed.  Patient immediately felt relief.  A/P - Radha Mcdermott is a 58 year old female with otalgia mostly due to large piece of cerumen stuck to canal wall  causing the problem.  Patient feels much better now.  She will follow-up as needed.      Boom Rutherford MD

## 2023-10-30 ENCOUNTER — OFFICE VISIT (OUTPATIENT)
Dept: OTOLARYNGOLOGY | Facility: CLINIC | Age: 58
End: 2023-10-30
Attending: FAMILY MEDICINE
Payer: COMMERCIAL

## 2023-10-30 ENCOUNTER — OFFICE VISIT (OUTPATIENT)
Dept: AUDIOLOGY | Facility: CLINIC | Age: 58
End: 2023-10-30
Attending: FAMILY MEDICINE
Payer: COMMERCIAL

## 2023-10-30 VITALS
OXYGEN SATURATION: 98 % | HEART RATE: 87 BPM | HEIGHT: 65 IN | DIASTOLIC BLOOD PRESSURE: 68 MMHG | WEIGHT: 194 LBS | TEMPERATURE: 97.5 F | SYSTOLIC BLOOD PRESSURE: 116 MMHG | BODY MASS INDEX: 32.32 KG/M2

## 2023-10-30 DIAGNOSIS — H92.01 RIGHT EAR PAIN: Primary | ICD-10-CM

## 2023-10-30 DIAGNOSIS — H61.21 IMPACTED CERUMEN OF RIGHT EAR: ICD-10-CM

## 2023-10-30 DIAGNOSIS — H91.93 BILATERAL HEARING LOSS, UNSPECIFIED HEARING LOSS TYPE: ICD-10-CM

## 2023-10-30 DIAGNOSIS — H92.01 RIGHT EAR PAIN: ICD-10-CM

## 2023-10-30 PROCEDURE — 99203 OFFICE O/P NEW LOW 30 MIN: CPT | Performed by: OTOLARYNGOLOGY

## 2023-10-30 PROCEDURE — 99207 PR NO CHARGE LOS: CPT | Performed by: AUDIOLOGIST

## 2023-10-30 PROCEDURE — 92567 TYMPANOMETRY: CPT | Performed by: AUDIOLOGIST

## 2023-10-30 PROCEDURE — 92557 COMPREHENSIVE HEARING TEST: CPT | Performed by: AUDIOLOGIST

## 2023-10-30 ASSESSMENT — PAIN SCALES - GENERAL: PAINLEVEL: NO PAIN (0)

## 2023-10-30 NOTE — LETTER
10/30/2023         RE: Radha Mcdermott  [address] [facility] [address]        Dear Colleague,    Thank you for referring your patient, Radha Mcdermott, to the [facility]. Please see a copy of my visit note below.    ENT Consultation    Radha Mcdermott who is a 58 year old female seen in consultation at the request of Dr. Jenna Rey.      History of Present Illness - Radha Mcdermott is a 58 year old female presents with right otalgia that has been ongoing now for the last year with feeling of discomfort symptoms throbbing pain pressure and also feeling of decreased hearing in the right ear.  Denies any tinnitus vertigo or dizziness or otorrhea.  She does grind her teeth from time to time.        BP Readings from Last 1 Encounters:   10/30/23 116/68       BP noted to be well controlled today in office.     Radha IS NOT a smoker/uses chewing tobacco.        Past Medical History -   Past Medical History:   Diagnosis Date     Abnormal Papanicolaou smear of cervix and cervical HPV 12/01/2003    ASCUS - repeat paps all ok.      Depressive disorder      Depressive disorder, not elsewhere classified      Dysmenorrhea      Fracture of arm age 14    right     Gestational diabetes      Papillary thyroid carcinoma (H) 10/01/2005    5 cm, MF, + ETE, node+; 3 operations, 131I     Postsurgical hypoparathyroidism (H24) 01/01/2005     Postsurgical hypothyroidism 01/01/2005     Unspecified contraceptive management        Current Medications -   Current Outpatient Medications:      calcitRIOL (ROCALTROL) 0.5 MCG capsule, TAKE ONE CAPSULE BY MOUTH ONCE DAILY, Disp: 90 capsule, Rfl: 4     docusate sodium 100 MG tablet, Take 100 mg by mouth 2 times daily as needed for constipation, Disp: 60 tablet, Rfl: 11     EPINEPHrine (ANY BX GENERIC EQUIV) 0.3 MG/0.3ML injection 2-pack, Inject 0.3 mLs (0.3 mg) into the muscle once as needed for anaphylaxis, Disp: 2 each, Rfl: 1     levothyroxine  (SYNTHROID/LEVOTHROID) 112 MCG tablet, Take 1 tablet (112 mcg) by mouth daily, Disp: 90 tablet, Rfl: 4     magnesium oxide (MAG-OX) 400 MG tablet, TAKE ONE-HALF TABLET BY MOUTH TWICE A DAY, Disp: 90 tablet, Rfl: 4     ibuprofen (ADVIL/MOTRIN) 800 MG tablet, Take 1 tablet (800 mg) by mouth every 6 hours as needed for other (mild and/or inflammatory pain) (Patient not taking: Reported on 10/30/2023), Disp: 15 tablet, Rfl: 0    Allergies -   Allergies   Allergen Reactions     Bees      Chicken Allergy Swelling     Meat Extract Swelling     No Known Drug Allergy        Social History -   Social History     Socioeconomic History     Marital status:      Spouse name: Mushtaq     Number of children: 2     Years of education: 12+   Occupational History     Occupation: Pharm. Tech     Employer: [employer]   Tobacco Use     Smoking status: Former     Packs/day: 0.50     Years: 16.00     Additional pack years: 0.00     Total pack years: 8.00     Types: Cigarettes     Quit date: 3/1/1998     Years since quittin.6     Smokeless tobacco: Never     Tobacco comments:     no smoking in the home   Vaping Use     Vaping Use: Never used   Substance and Sexual Activity     Alcohol use: Yes     Comment: social     Drug use: No     Sexual activity: Yes     Partners: Male     Birth control/protection: None   Other Topics Concern      Service No     Blood Transfusions No     Caffeine Concern No     Comment: occasional     Occupational Exposure No     Hobby Hazards No     Sleep Concern No     Stress Concern No     Weight Concern No     Special Diet No     Back Care No     Exercise Yes     Bike Helmet No     Seat Belt Yes     Self-Exams Yes     Parent/sibling w/ CABG, MI or angioplasty before 65F 55M? No   Social History Narrative    Lives with son.       Family History -   Family History   Problem Relation Age of Onset     Lipids Mother      Osteoporosis Mother      Cancer Father         skin     Dementia Father  "     Pacemaker Sister         ?a fib     Asthma Brother      Diabetes Maternal Grandmother      Thyroid Disease Maternal Grandmother         thyroid disease     Alzheimer Disease Paternal Grandmother      Heart Disease Paternal Grandmother      Cerebrovascular Disease Paternal Grandmother      Thyroid Disease Paternal Grandmother      Prostate Cancer Paternal Grandfather      Allergies Son         pcn     Cancer Maternal Aunt         breast - mid-30s     Diabetes Cousin      Breast Cancer Cousin        Review of Systems - As per HPI and PMHx, otherwise review of system review of the head and neck negative. Otherwise 10+ review of system is negative    Physical Exam  /68 (BP Location: Right arm, Cuff Size: Adult Large)   Pulse 87   Temp 97.5  F (36.4  C) (Temporal)   Ht 1.65 m (5' 4.96\")   Wt 88 kg (194 lb)   LMP  (LMP Unknown)   SpO2 98%   BMI 32.32 kg/m    BMI: Body mass index is 32.32 kg/m .    General - The patient is well nourished and well developed, and appears to have good nutritional status.  Alert and oriented to person and place, answers questions and cooperates with examination appropriately.    SKIN - No suspicious lesions or rashes.  Respiration - No respiratory distress.  Head and Face - Normocephalic and atraumatic, with no gross asymmetry noted of the contour of the facial features.  The facial nerve is intact, with strong symmetric movements.    Voice and Breathing - The patient was breathing comfortably without the use of accessory muscles. The patients voice was clear and strong, and had appropriate pitch and quality.    Ears - Bilateral pinna and EACs with normal appearing overlying skin. Tympanic membrane intact with good mobility on pneumatic otoscopy bilaterally. Bony landmarks of the ossicular chain are normal. The tympanic membranes are normal in appearance. No retraction, perforation, or masses.  No fluid or purulence was seen in the external canal or the middle ear.  Exam was " after removal of a large piece of cerumen stuck to the anterior canal wall in the mid canal on the right side.  No pressure tenderness over the TMJ area.    Eyes - Extraocular movements intact.  Sclera were not icteric or injected, conjunctiva were pink and moist.    Mouth - Examination of the oral cavity showed pink, healthy oral mucosa. No lesions or ulcerations noted.  The tongue was mobile and midline, and the dentition were in good condition.      Throat - The walls of the oropharynx were smooth, pink, moist, symmetric, and had no lesions or ulcerations.  The tonsillar pillars and soft palate were symmetric.  The uvula was midline on elevation.    Neck - Normal midline excursion of the laryngotracheal complex during swallowing.  Full range of motion on passive movement.  Palpation of the occipital, submental, submandibular, internal jugular chain, and supraclavicular nodes did not demonstrate any abnormal lymph nodes or masses.  The carotid pulse was palpable bilaterally.  Palpation of the thyroid was soft and smooth, with no nodules or goiter appreciated.  The trachea was mobile and midline.      Neuro - Nonfocal neuro exam is normal, CN 2 through 12 intact, normal gait and muscle tone.      Performed in clinic today:  Audiologic Studies - An audiogram and tympanogram were performed today as part of the evaluation and personally reviewed. The tympanogram shows normal Type A curves, with normal canal volumes and middle ear pressures.  There is no sign of eustachian tube dysfunction or middle ear effusion.  The audiogram was also normal.  The sensorineural hearing was age-appropriate, with no evidence of conductive hearing loss or significant asymmetry.    Removal of a large piece of cerumen on the right side mid canal.  Under the guidance of magnified speculum I appreciate large chunk of cerumen partially obstructing the canal.  Manipulation of the cerumen caused some reproduced discomfort that patient feels.   It appears to be adherent with dry skin to the anterior canal wall.  It was carefully mobilized with cerumen curette alligator forceps and suction and removed.  Patient immediately felt relief.  A/P - Radha Mcdermott is a 58 year old female with otalgia mostly due to large piece of cerumen stuck to canal wall causing the problem.  Patient feels much better now.  She will follow-up as needed.      Boom Rutherford MD       Again, thank you for allowing me to participate in the care of your patient.        Sincerely,        Boom Rutherford MD, MD

## 2023-10-30 NOTE — PROGRESS NOTES
AUDIOLOGY REPORT     SUMMARY: Audiology visit completed. See audiogram for results.     RECOMMENDATIONS: Follow-up with ENT    Fatimah Reyes Licensed Audiologist #[id number]

## 2024-01-03 ENCOUNTER — LAB (OUTPATIENT)
Dept: LAB | Facility: CLINIC | Age: 59
End: 2024-01-03
Payer: COMMERCIAL

## 2024-01-03 DIAGNOSIS — E89.2 POST-SURGICAL HYPOPARATHYROIDISM (H): ICD-10-CM

## 2024-01-03 DIAGNOSIS — E89.0 POSTSURGICAL HYPOTHYROIDISM: ICD-10-CM

## 2024-01-03 DIAGNOSIS — C73 PAPILLARY THYROID CARCINOMA (H): ICD-10-CM

## 2024-01-03 LAB
CALCIUM SERPL-MCNC: 8.4 MG/DL (ref 8.6–10)
CREAT SERPL-MCNC: 1 MG/DL (ref 0.51–0.95)
EGFRCR SERPLBLD CKD-EPI 2021: 66 ML/MIN/1.73M2
PHOSPHATE SERPL-MCNC: 3.3 MG/DL (ref 2.5–4.5)
T4 FREE SERPL-MCNC: 1.76 NG/DL (ref 0.9–1.7)
TSH SERPL DL<=0.005 MIU/L-ACNC: 0.15 UIU/ML (ref 0.3–4.2)

## 2024-01-03 PROCEDURE — 84100 ASSAY OF PHOSPHORUS: CPT

## 2024-01-03 PROCEDURE — 36415 COLL VENOUS BLD VENIPUNCTURE: CPT

## 2024-01-03 PROCEDURE — 82565 ASSAY OF CREATININE: CPT

## 2024-01-03 PROCEDURE — 86800 THYROGLOBULIN ANTIBODY: CPT | Mod: 90

## 2024-01-03 PROCEDURE — 84439 ASSAY OF FREE THYROXINE: CPT

## 2024-01-03 PROCEDURE — 84443 ASSAY THYROID STIM HORMONE: CPT

## 2024-01-03 PROCEDURE — 99000 SPECIMEN HANDLING OFFICE-LAB: CPT

## 2024-01-03 PROCEDURE — 84432 ASSAY OF THYROGLOBULIN: CPT | Mod: 90

## 2024-01-03 PROCEDURE — 82310 ASSAY OF CALCIUM: CPT

## 2024-01-08 LAB — SCANNED LAB RESULT: NORMAL

## 2024-01-16 LAB
CALCIUM 24H UR-MRATE: 0.14 G/SPEC (ref 0.1–0.3)
CALCIUM UR-MCNC: 9.4 MG/DL
COLLECT DURATION TIME UR: 24 H
COLLECT DURATION TIME UR: 24 H
CREAT 24H UR-MRATE: 1.11 G/SPEC (ref 0.72–1.51)
CREAT UR-MCNC: 75.4 MG/DL
SPECIMEN VOL UR: 1475 ML
SPECIMEN VOL UR: 1475 ML

## 2024-01-16 PROCEDURE — 82340 ASSAY OF CALCIUM IN URINE: CPT

## 2024-01-16 PROCEDURE — 81050 URINALYSIS VOLUME MEASURE: CPT

## 2024-01-16 PROCEDURE — 82570 ASSAY OF URINE CREATININE: CPT

## 2024-03-12 ENCOUNTER — MYC MEDICAL ADVICE (OUTPATIENT)
Dept: FAMILY MEDICINE | Facility: CLINIC | Age: 59
End: 2024-03-12
Payer: COMMERCIAL

## 2024-03-12 ENCOUNTER — NURSE TRIAGE (OUTPATIENT)
Dept: FAMILY MEDICINE | Facility: CLINIC | Age: 59
End: 2024-03-12
Payer: COMMERCIAL

## 2024-03-12 NOTE — TELEPHONE ENCOUNTER
RN TRIAGE CALL:    Patient Contact    Attempt # 1    Was call answered?  No.  Left message on voicemail with information to call me back. Also sent [employer] response.    PAPA MontoyaN, RN        Radha MARTINEZ [facility] (supporting Jenna Rey MD)1 hour ago (8:08 AM)     Hi Dr Rey,   On Friday 3/8 I had an allergic reaction to something I must have eaten or was contaminated. Covered in hives all over my body and swelling in multiple places on my face and hands. Usually when I take Zyrtec or Benadryl it helps and within a day it goes away. Unfortunately it hasn't gone away yet. Can you recommend something else that I could take or send a prescription to help relieve the pain and itching? Thank you

## 2024-03-12 NOTE — TELEPHONE ENCOUNTER
Attempted to call patient. See telephone encounter 3/12/24. Also sent [employer] response.     PAPA MontoyaN, RN

## 2024-03-12 NOTE — TELEPHONE ENCOUNTER
Nurse Triage SBAR    Is this a 2nd Level Triage? NO    Situation: Patient calling back to [employer] message that was sent regarding an allergic reaction she had on Friday. She still currently has persisting hives all over despite taking antihistamines and hands and feet are still swollen.     Background: Not sure what she reacted to this time, did not interact with anything she knows she is allergic to.    Assessment: Patient is not having any no chest pain, no shortness of breath, no difficulty breathing, no swelling of tongue or swelling in face anymore.    Persisting hives all over that won't go away.      Protocol Recommended Disposition:   See in Office Today    Recommendation: Recommended that patient be seen today. No appointments available in [place] today that works with patient's schedule. Went over urgent care locations and recommended she could seek one of those out today and scheduled with same day provider if case patient could not make it to urgent care today. She will cancel if she seeks urgent care out which is what writer advised. Went over red flag symptoms as well. Patient agreeable to plan.     Does the patient meet one of the following criteria for ADS visit consideration? No    Zara Deal RN on 3/12/2024 at 10:09 AM      Reason for Disposition   MODERATE-SEVERE hives persist (i.e., hives interfere with normal activities or work) and taking antihistamine (e.g., Benadryl, Claritin) > 24 hours    Additional Information   Negative: Difficulty breathing or wheezing now   Negative: Rapid onset of swollen tongue   Negative: Rapid onset of hoarseness or cough   Negative: Very weak (can't stand)   Negative: Difficult to awaken or acting confused (e.g., disoriented, slurred speech)   Negative: Life-threatening reaction (anaphylaxis) in the past to similar substance (e.g., food, insect bite/sting, chemical, etc.) and < 2 hours since exposure   Negative: Sounds like a life-threatening emergency  to the triager   Negative: Bee, wasp, or yellow jacket sting within last 24 hours   Negative: Taking a new medicine now or within last 3 days  (Exception: Antihistamine, decongestant or other OTC cough/cold medicines.)   Negative: Doesn't match the SYMPTOMS of hives   Negative: Swollen tongue   Negative: Widespread hives, itching, or facial swelling and onset < 2 hours of exposure to high-risk allergen (e.g., 1st dose of antibiotic, nuts, sting)   Negative: Patient sounds very sick or weak to the triager    Protocols used: Hives-A-OH

## 2024-03-13 ENCOUNTER — OFFICE VISIT (OUTPATIENT)
Dept: FAMILY MEDICINE | Facility: CLINIC | Age: 59
End: 2024-03-13
Payer: COMMERCIAL

## 2024-03-13 VITALS
SYSTOLIC BLOOD PRESSURE: 110 MMHG | OXYGEN SATURATION: 98 % | WEIGHT: 195.4 LBS | HEART RATE: 68 BPM | RESPIRATION RATE: 18 BRPM | BODY MASS INDEX: 32.55 KG/M2 | TEMPERATURE: 98 F | DIASTOLIC BLOOD PRESSURE: 72 MMHG | HEIGHT: 65 IN

## 2024-03-13 DIAGNOSIS — L50.9 HIVES: Primary | ICD-10-CM

## 2024-03-13 DIAGNOSIS — Z91.018 FOOD ALLERGY: ICD-10-CM

## 2024-03-13 PROCEDURE — 99213 OFFICE O/P EST LOW 20 MIN: CPT | Performed by: NURSE PRACTITIONER

## 2024-03-13 RX ORDER — METHYLPREDNISOLONE 4 MG
TABLET, DOSE PACK ORAL
Qty: 21 TABLET | Refills: 0 | Status: SHIPPED | OUTPATIENT
Start: 2024-03-13 | End: 2024-09-30

## 2024-03-13 ASSESSMENT — PATIENT HEALTH QUESTIONNAIRE - PHQ9
SUM OF ALL RESPONSES TO PHQ QUESTIONS 1-9: 0
SUM OF ALL RESPONSES TO PHQ QUESTIONS 1-9: 0

## 2024-03-13 ASSESSMENT — PAIN SCALES - GENERAL: PAINLEVEL: MILD PAIN (3)

## 2024-03-13 NOTE — PROGRESS NOTES
Assessment & Plan     Hives  - methylPREDNISolone (MEDROL DOSEPAK) 4 MG tablet therapy pack; Follow Package Directions  - Adult Allergy/Asthma  Referral; Future    Food allergy  - Adult Allergy/Asthma  Referral; Future    Oral steroid as above given systemic symptoms   Increase Zyrtec to twice daily, Benadryl as needed  Patient requested allergy/immunology referral which has been placed   She does have EpiPen at home for more severe reactions if needed   Worrisome symptoms reviewed, follow-up as needed.     I explained my diagnostic considerations and recommendations to the patient, who voiced understanding and agreement with the assessment and treatment plan. All questions were answered to patient's apparent satisfaction. We discussed potential side effects of any prescribed or recommended therapies, as well as expectations for response to treatments and importance of lifestyle measures that may improve symptoms. Patient was advised to contact our office if there are new symptoms or no improvement or worsening of conditions or symptoms.                    Chris Garcia is a 58 year old, presenting for the following health issues:  Hives (Itching all over. Hives are on arms and legs) and Allergic Reaction (Has not had anything not normal so unsure of cause)        3/13/2024     8:08 AM   Additional Questions   Roomed by Jamilah SMITH     History of Present Illness       Reason for visit:  Allergic reaction  Symptom onset:  3-7 days ago  Symptoms include:  Hives and swelling  Symptom intensity:  Moderate  Symptom progression:  Staying the same  Had these symptoms before:  Yes  Has tried/received treatment for these symptoms:  Yes  Previous treatment was successful:  Yes  Prior treatment description:  Taking benadryl or zyrtec    She eats 2-3 servings of fruits and vegetables daily.She consumes 0 sweetened beverage(s) daily.She exercises with enough effort to increase her heart rate 20 to 29  minutes per day.  She exercises with enough effort to increase her heart rate 3 or less days per week.   She is taking medications regularly.     Radha presents today with concerns of hives. She reports her symptoms started on Friday with redness on her face and swelling around her left eye. She had improvement from those areas but has since develops patches of hives across her body, chest, abdomen, back, arms and legs. The patches of hives are itchy, raised and red. She denies any blistering or drainage. She started taking Bendaryl and Zyrtec. She reports this has happened to her in the past however her symptoms usually resolved within a few days of starting oral antihistamines. She denies any fever, chills, nausea, vomiting, difficulty breathing, chest pain or shortness of breath. She has a history of food allergies, she states she did see Allergist in the past but notes it has been may years and feels her symptoms and reactions have changed since that time.     Patient Active Problem List   Diagnosis    Dysmenorrhea    Contraceptive management    Post-surgical hypoparathyroidism (H24)    Disorder of magnesium metabolism    Papillary thyroid carcinoma (H)    Anxiety state    Postsurgical hypothyroidism    Iron deficiency anemia    CARDIOVASCULAR SCREENING; LDL GOAL LESS THAN 160    Health Care Home    Pulmonary nodules    Abdominal mass    Shoulder joint pain    Cystocele, midline    Class 1 obesity with serious comorbidity and body mass index (BMI) of 32.0 to 32.9 in adult, unspecified obesity type    Chest pain    S/P coronary angiogram    Abnormal CT of the chest    Ground glass opacity present on imaging of lung     Current Outpatient Medications   Medication    calcitRIOL (ROCALTROL) 0.5 MCG capsule    docusate sodium 100 MG tablet    EPINEPHrine (ANY BX GENERIC EQUIV) 0.3 MG/0.3ML injection 2-pack    levothyroxine (SYNTHROID/LEVOTHROID) 112 MCG tablet    magnesium oxide (MAG-OX) 400 MG tablet     "methylPREDNISolone (MEDROL DOSEPAK) 4 MG tablet therapy pack    ibuprofen (ADVIL/MOTRIN) 800 MG tablet     No current facility-administered medications for this visit.       Review of Systems  Constitutional, HEENT, cardiovascular, pulmonary, gi and gu systems are negative, except as otherwise noted.      Objective    /72   Pulse 68   Temp 98  F (36.7  C) (Temporal)   Resp 18   Ht 1.651 m (5' 5\")   Wt 88.6 kg (195 lb 6.4 oz)   LMP  (LMP Unknown)   SpO2 98%   BMI 32.52 kg/m    Body mass index is 32.52 kg/m .  Physical Exam  Vitals reviewed.   Constitutional:       General: She is not in acute distress.     Appearance: Normal appearance. She is not toxic-appearing.   Pulmonary:      Effort: Pulmonary effort is normal.   Skin:     General: Skin is warm and dry.      Comments: Scattered patches of wheel-like rash on bilateral forearms, chest, abdomen and low back. Few patches on bilateral lower extremities.    Neurological:      Mental Status: She is alert and oriented to person, place, and time. Mental status is at baseline.   Psychiatric:         Mood and Affect: Mood normal.         Behavior: Behavior normal.                    Signed Electronically by: Kiara Blandon NP    "

## 2024-04-30 ENCOUNTER — MYC MEDICAL ADVICE (OUTPATIENT)
Dept: FAMILY MEDICINE | Facility: CLINIC | Age: 59
End: 2024-04-30
Payer: COMMERCIAL

## 2024-05-01 ENCOUNTER — E-VISIT (OUTPATIENT)
Dept: FAMILY MEDICINE | Facility: CLINIC | Age: 59
End: 2024-05-01
Payer: COMMERCIAL

## 2024-05-01 DIAGNOSIS — L50.9 HIVES: Primary | ICD-10-CM

## 2024-05-01 PROCEDURE — 99421 OL DIG E/M SVC 5-10 MIN: CPT | Performed by: NURSE PRACTITIONER

## 2024-05-01 RX ORDER — METHYLPREDNISOLONE 4 MG
TABLET, DOSE PACK ORAL
Qty: 21 TABLET | Refills: 0 | Status: SHIPPED | OUTPATIENT
Start: 2024-05-01 | End: 2024-09-30

## 2024-05-01 NOTE — PATIENT INSTRUCTIONS
Thank you for choosing us for your care. I have placed an order for a prescription so that you can start treatment. View your full visit summary for details by clicking on the link below. Your pharmacist will able to address any questions you may have about the medication.     If you're not feeling better within 5-7 days, please schedule an appointment.  You can schedule an appointment right here in [facility], or call [phone]  If the visit is for the same symptoms as your eVisit, we'll refund the cost of your eVisit if seen within seven days.

## 2024-05-10 ENCOUNTER — PATIENT OUTREACH (OUTPATIENT)
Dept: CARE COORDINATION | Facility: CLINIC | Age: 59
End: 2024-05-10
Payer: COMMERCIAL

## 2024-05-24 ENCOUNTER — PATIENT OUTREACH (OUTPATIENT)
Dept: CARE COORDINATION | Facility: CLINIC | Age: 59
End: 2024-05-24
Payer: COMMERCIAL

## 2024-05-30 DIAGNOSIS — E89.2 POST-SURGICAL HYPOPARATHYROIDISM (H): ICD-10-CM

## 2024-05-31 RX ORDER — CALCITRIOL 0.5 UG/1
CAPSULE, LIQUID FILLED ORAL
Qty: 90 CAPSULE | Refills: 4 | Status: SHIPPED | OUTPATIENT
Start: 2024-05-31

## 2024-05-31 RX ORDER — MAGNESIUM OXIDE 400 MG/1
TABLET ORAL
Qty: 90 TABLET | Refills: 4 | Status: SHIPPED | OUTPATIENT
Start: 2024-05-31

## 2024-07-03 ENCOUNTER — PATIENT OUTREACH (OUTPATIENT)
Dept: CARE COORDINATION | Facility: CLINIC | Age: 59
End: 2024-07-03
Payer: COMMERCIAL

## 2024-07-24 NOTE — LETTER
9/20/2022       RE: Radha Mcdermott  [address] [facility] [address]     Dear Colleague,    Thank you for referring your patient, Radha Mcdermott, to the [facility] [place] at [facility]. Please see a copy of my visit note below.    Endocrinology Fellow note    Chief complaint:  Radha is a 57 year old female seen for follow up of papillary thyroid carcinoma.     ASSESSMENT/PLAN:     # Papillary Thyroid Carcinoma pT4a(5cm, ETE), N1, M0, stage Stage III, JUAN DIEGO intermediate risk for recurrence s/p total thyroidectomy 2005 + left neck dissection x3 & I-131. Excellent biochemical (Thgb <0.1 04/2022) and structural response (US 11/2020 not concerning).   - TSH, FT4, thyroglobulin yearly standing orders (next due in 04/2023)  - Repeat thyroid ultrasound expected in 11/2025    # Postoperative Hypothyroidism   Goal TSH < 0.4, TSH at goal in 04/2022 (TSH<0.01). Asymptomatic. Given she is postmenopausal; will slightly decrease dose for bone protection.   - Decrease Levothyroxine 125 mcg daily x6 days and half tablet x1 day (116 mcg daily).    # Postoperative Hypoparathyroidism    Is on calcitriol and magnesium (unable to tolerate calcium). Has had suboptimal control with decrease to 0.25 mcg dose in the past, doing well on current regimen.  - ca,phos, cr labs q 6 months  -Calcitriol 0.5 mcg daily   -Continue magnesium 200 mg BID     Shivam Macario MD  Endocrinology Fellow, PGY V    Due to the COVID 19 pandemic this visit was a telephone /video visit in order to help prevent spread of infection in this high risk patient and the general population. The patient gave verbal consent for the visit today.    Platform Used: [employer]  Start time 3:38 PM  Stop time 3:54 PM  Total time 16 minutes.      HISTORY OF PRESENT ILLNESS    Ms Mcdermott is a 56 yo F with a past medical history of papillary thyroid carcinoma.     Thyroid Cancer Treatment  History:  Thyroid cancer has been treated with 3 operations  10/7/05   Thyroidectomy removing  left sided PCT 5.0 x 3.0 x 2.8 cm, extending through the capsule into parathyroidal soft tissues.  There was - Microscopic foci of papillary carcinoma in the right lobe and one LN removed was positive for pCT.  Her post operative course was  complicated by hypoparathyroidism.   132 mCi 131I 11/05.     12/28/06   Left modified neck dissection.    10/22/09 selective neck dissection to include level 7 and level 4 on the left.  Surgical pathology was positive for metastatic PCT in lymph nodes        We have the following tumor marker data:  9/9/05: Tg 290, JUAN DIEGO < 1, TSH   Operation # 1  10/28/05: Tg 131, JUAN DIEGO < 1, TSH 15.12  11/28/05: Tg 116, JUAN DIEGO <1, ,7  2/3/06: Tg 26.7, JUAN DIEGO < 1, TSH 14.43  6/2/06: Tg 2.2, JUAN DIEGO < 1, TSH 0.36  11/3/06: Tg 18, JUAN DIEGO < 1  Operation # 2  1/30/07: Tg 1.1, JUAN DIEGO < 1, TSH   4/17/07: Tg 2, JUAN DIEGO < 1, TSH   12/11/07: Tg 13.9, JUAN DIEGO < 1, TSH 42.02  1/8/08: Tg 2.7, JUAN DIEGO < 1, TSH   3/3/09: Tg 1.0, JUAN DIEGO < 0.4, TSH   4/24/09: Tg 4667, JUAN DIEGO < 0.4  Operation # 3  1/6/10: Tg 0.12, JUAN DIEGO < 0.4, TSH < 0.03  10/17/11: Tg 0.3, JUAN DIEGO < 20,   1/20/12: Tg 3.1, JUAN DIEGO < 20  3/21/12: Tg 0.1, JUAN DIEGO < 20, TSH 0.05  4/26/12: Tg 0.1, JUAN DIEGO < 20, TSH 0.02  10/26/12: Tg 3.5, JUAN DIEGO < 20, TSH 0.02 TBS -- supposedly thyrogen was given but the results and dates don't quite fit  4/26/13: Tg 0.2, JUAN DIEGO < 20, TSH 0.01  11/8/13: Tg 3.3, JUAN DIEGO < 20 TBS negative  2/10/14: Tg 0.1, JUAN DIEGO < 20  8/18/14: Tg 0.1, JUAN DIEGO < 20, TSH 0.00  11/21/14: Tg 4 ng/ml (athyrotic < 0.1, intact < 33 [facility]) JUAN DIEGO < 1.8,TSH 0.01  12/30/14: TSH 0.01  3/4/15; Tg 0.19, JUAN DIEGO < 0.4, TSH  <0.02 ; concurrent remeasure of 1/6/10 Tg 0.15  9/2/15: Tg 0.11, JUAN DIEGO < 0.4, TSH 0.01  5/26/16: Tg 0.27, JUAN DIEGO < 0.4, TSH 0.55  12/13/16: Tg 0.14, JUAN DIEGO < 0.4, TSH 0.07, free T4 1.32  6/13/17: Tg 0.17, JUAN DIEGO < 0.4, TSH 0.11-   9/24/18: Tg 0.18, JUAN DIEGO < 0.4, TSH 0.14 , free T 4 1.22,   10/10/18 24 hour urine  calcium 80 mg/24 hours  11/30/18 TSH 7.49 - seen by me today for lst time.   2/4/2020: Tg < 0.1, JUAN DIEGO < 0.4, TSH 0.38, free T4 1.52, Ca 8.4, phos 3.6, creatinine 1.09, K 3.5-   10/28/2020 Ca 9, phos 4.6, creatinine 1.01, K 4.2,   04/2022 Thg <0.1, Ab <0.4, TSH <0.01, FT4 1.62      She endorses doing well overall. Denies any significant symptoms. Tolerating levothyroxine dose well.     REVIEW OF SYSTEMS  No numbness, tingling or muscle cramps.   Knee pain and ankle pain.  Intermittent palpitations at times.  Denies tremors, or heat intolerance.   No fractures. Is postmenopausal.   Weight has remained stable ; is at ~189 lbs consistently.     Past Medical History  Past Medical History:   Diagnosis Date     Abnormal Papanicolaou smear of cervix and cervical HPV 12/03    ASCUS - repeat paps all ok.      Depressive disorder, not elsewhere classified      Dysmenorrhea      Fracture of arm age 14    right     Gestational diabetes      Papillary thyroid carcinoma (H) 10/05    5 cm, MF, + ETE, node+; 3 operations, 131I     Postsurgical hypoparathyroidism (H) 2005     Postsurgical hypothyroidism 2005     Unspecified contraceptive management        Medications  Current Outpatient Medications   Medication     calcitRIOL (ROCALTROL) 0.5 MCG capsule     diclofenac (VOLTAREN) 75 MG EC tablet     diclofenac (VOLTAREN) 75 MG EC tablet     diclofenac (VOLTAREN) 75 MG EC tablet     docusate sodium 100 MG tablet     EPINEPHrine (EPIPEN) 0.3 MG/0.3ML injection     magnesium oxide (MAG-OX) 400 (241.3 Mg) MG tablet     magnesium oxide (MAG-OX) 400 MG tablet     SYNTHROID 125 MCG tablet     No current facility-administered medications for this visit.       Current Outpatient Medications   Medication Sig Dispense Refill     calcitRIOL (ROCALTROL) 0.5 MCG capsule Take 1 capsule (0.5 mcg) by mouth daily 90 capsule 4     diclofenac (VOLTAREN) 75 MG EC tablet Take 1 tablet (75 mg) by mouth 2 times daily 60 tablet 1     diclofenac (VOLTAREN) 75  MG EC tablet Take 1 tablet (75 mg) by mouth 2 times daily (Patient not taking: Reported on 2022) 60 tablet 0     diclofenac (VOLTAREN) 75 MG EC tablet Take 1 tablet (75 mg) by mouth 2 times daily (Patient not taking: Reported on 2022) 60 tablet 1     docusate sodium 100 MG tablet Take 100 mg by mouth 2 times daily as needed for constipation (Patient not taking: Reported on 2022) 60 tablet 11     EPINEPHrine (EPIPEN) 0.3 MG/0.3ML injection Inject 0.3 mg into the muscle once as needed for anaphylaxis (Patient not taking: Reported on 2022)       magnesium oxide (MAG-OX) 400 (241.3 Mg) MG tablet TAKE ONE-HALF TABLET BY MOUTH TWICE A DAY 90 tablet 4     magnesium oxide (MAG-OX) 400 MG tablet Take 0.5 tablets (200 mg) by mouth 2 times daily 90 tablet 4     SYNTHROID 125 MCG tablet Take 1 tablet (125 mcg) by mouth daily Separate iron or calcium-containing products and levothyroxine by at least 4 hours. 90 tablet 4       Allergies  Allergies   Allergen Reactions     Bees      Chicken Allergy Swelling     Meat Extract Swelling     No Known Drug Allergies          Family History  family history includes Allergies in her son; Alzheimer Disease in her paternal grandmother; Cancer in her father, maternal aunt, and paternal grandfather; Cerebrovascular Disease in her paternal grandmother; Dementia in her father; Diabetes in her maternal grandmother; Heart Disease in her paternal grandmother; Lipids in her mother; Osteoporosis in her mother; Thyroid Disease in her maternal grandmother and paternal grandmother.    Social History  Social History     Tobacco Use     Smoking status: Former Smoker     Packs/day: 0.50     Years: 15.00     Pack years: 7.50     Quit date: 3/1/1998     Years since quittin.5     Smokeless tobacco: Never Used     Tobacco comment: no smoking in the home   Substance Use Topics     Alcohol use: Yes     Comment: social     Drug use: No       Physical Exam  There were no vitals taken for  this visit.  There is no height or weight on file to calculate BMI.  GENERAL :  In no apparent distress  SKIN: No visible rash. Visible skin clear  EYES: No scleral icterus,  No proptosis, conjunctival redness, stare, retraction  NECK: No visible masses or goiter.   RESP: No audible cough, normal work of breathing   NEURO: awake, alert, responds appropriately to questions.     DATA REVIEW  ENDO THYROID LABS-[facility] TSH   Latest Ref Rng & Units 0.40 - 4.00 mU/L   2022 <0.01 (L)   2020 0.38 (L)   2018 7.49 (H)   2018 0.14 (L)   2017 0.11 (L)   2016 0.07 (L)   2016 0.55   2015 0.01 (L)   2015 <0.01 (L)     ENDO THYROID LABS-[facility] FREE T4   Latest Ref Rng & Units 0.76 - 1.46 ng/dL   2022 1.62 (H)   2020 1.52 (H)   2018 0.85   2018 1.22   2017 1.06   2016 1.32   2016 1.03   2015 1.38   2015 1.39       Past imaging on PACs.    3/4/09 neck US: left level 7 lymph node, ? left level 4  09 PET/CT negative to my eye  10/2021 Calcium 8.5, phos 4.5, Cr 1.13, eGFR 58 (2022)     Outside imaging reports:  10/26/12: 4 mCi 131I TBS negative  13: 4 mCi 131I TBS: negative  14: PET CT without contrast: focal left ovarian avidity of uncertain significance (also seen on outside PET )-- t  16 US neck negative  16 CT chest: scattered subcm nodules  3/13/19 chest CT: stable to 2016   2020: US Head and Neck - negative.       Attending tie-in statement: Patient seen and examined by me, discussed with Dr Macario  fellow whose note I have reviewed and with which I agree.  Key elements and diagnostic points include the followin.  Papillary carcinoma thyroid, multifocal, bilateral,  largest tumor left 5 cm, + ETE, multiple node positive. She been treated with total thyroidectomy and left neck dissection (in 3 operations) and also 132 mCi 131.  -   Labs: Tg, TSH, Free T4  Yearly standing orders  See me yearly   Neck US  2025    Hypothyroidism due to thyroidectomy. As per # 1.  Treat to target TSH < 0.4.  Synthroid 125 mcg/day.  Reduce to 125 x 6.5/week     surgical hypoparathyroidism (partial) since first operation -  On calcitriol 0.5 mcg/day.  She is not taking calcium and she is intolerant of dairy; she takes Mg. In the past she had symptoms on calcitriol 0.25 mcg/day.    Standing orders for labs every 6 months.    pulmonary nodules, subcm, stable on serial imaging, most recent 3/19. I am noting this as it still may be relevant to # 1    Due to the COVID 19 pandemic this visit was a video visit. The patient gave verbal consent for the visit today.    I have independently reviewed and interpreted labs, imaging as indicated.     Chart review/prep time 1  [id number]  Visit Start time 1539  Visit Stop time 1553  _38_ minutes spent on the date of the encounter doing chart review, history and exam, documentation and further activities as noted above.    Vero Arevalo MD  Endocrinology & Diabetes.      Cc/ HPI  57  year old seen for post surgical hypothyroidism, post surgical hypoparathyroidism and Papillary thyroid cancer.   She was last seen by me 2/2021    Thyroid cancer has been treated with 3 operations  10/7/05   Thyroidectomy - left sided PCT 5.0 x 3.0 x 2.8 cm, extending through the capsule into parathyroidal soft tissues.  - Microscopic  papillary carcinoma in the right lobe and one LN removed was positive for pCT.  Her post operative course was  complicated by hypoparathyroidism.   132 mCi 131I 11/05.     12/28/06   Left modified neck dissection.  +6/11 LN  10/22/09 selective neck dissection to include level 7 and level 4 on the left.  + metastatic PCT in 4/4  lymph nodes     We have the following tumor marker data:  9/9/05: Tg 290, JUAN DIEGO < 1, TSH   Operation # 1  10/28/05: Tg 131, JUAN DIEGO < 1, TSH 15.12  11/28/05: Tg 116, JUAN DIEGO <1, ,7  2/3/06: Tg 26.7, JUAN DIEGO < 1, TSH 14.43  6/2/06: Tg 2.2, JUAN DIEGO < 1, TSH 0.36  11/3/06: Tg 18,  Counseling: I discussed the itch-scratch cycle. I explained that scratching will lead to more itching and this cycle becomes self perpetuating. I discussed methods to prevent scratching in an effort to stop the patient's itching. Detail Level: Simple JUAN DIEGO < 1  Operation # 2  1/30/07: Tg 1.1, JUAN DIEGO < 1, TSH   4/17/07: Tg 2, JUAN DIEGO < 1, TSH   12/11/07: Tg 13.9, JUAN DIEGO < 1, TSH 42.02  1/8/08: Tg 2.7, JUAN DIEGO < 1, TSH   3/3/09: Tg 1.0, JUAN DIEGO < 0.4, TSH   4/24/09: LN FNAB needle wash  Tg 4667, JUAN DIEGO < 0.4  Operation # 3  1/6/10: Tg 0.12, JUAN DIEGO < 0.4, TSH < 0.03  10/17/11: Tg 0.3, JUAN DIEGO < 20,   1/20/12: Tg 3.1, JUAN DIEGO < 20  3/21/12: Tg 0.1, JUAN DIEGO < 20, TSH 0.05  4/26/12: Tg 0.1, JUAN DIEGO < 20, TSH 0.02  10/26/12: Tg 3.5, JUAN DIEGO < 20, TSH 0.02 TBS -- supposedly thyrogen was given but the results and dates don't quite fit  4/26/13: Tg 0.2, JUAN DIEGO < 20, TSH 0.01  11/8/13: Tg 3.3, JUAN DIEGO < 20 TBS negative  2/10/14: Tg 0.1, JUAN DIEGO < 20  8/18/14: Tg 0.1, JUAN DIEGO < 20, TSH 0.00  11/21/14: Tg 4 ng/ml (athyrotic < 0.1, intact < 33 [facility]) JUAN DIEGO < 1.8,TSH 0.01  12/30/14: TSH 0.01  3/4/15; Tg 0.19, JUAN DIEGO < 0.4, TSH  <0.02 ; concurrent remeasure of 1/6/10 Tg 0.15  9/2/15: Tg 0.11, JUAN DIEGO < 0.4, TSH 0.01  5/26/16: Tg 0.27, JUAN DIEGO < 0.4, TSH 0.55  12/13/16: Tg 0.14, JUAN DIEGO < 0.4, TSH 0.07, free T4 1.32  6/13/17: Tg 0.17, JUAN DIEGO < 0.4, TSH 0.11-   9/24/18: Tg 0.18, JUAN DIEGO < 0.4, TSH 0.14 , free T 4 1.22,   10/10/18 24 hour urine calcium 80 mg/24 hours   2/4/2020: Tg < 0.1, JUAN DIEGO < 0.4, TSH 0.38, free T4 1.52, Ca 8.4, phos 3.6, creatinine 1.09, K 3.5-   10/28/2020 Ca 9, phos 4.6, creatinine 1.01, K 4.2,   4/20/2022 Tg < 0.1, JUAN DIEGO < 0.4,  TSH < 0.01, free T4 1.62, Ca 8.5, creatinine 1.13      past imaging .    3/4/09 neck US: left level 7 lymph node, ? left level 4  5/27/09 PET/CT negative to my eye  10/26/12: 4 mCi 131I TBS negative  11/6/13: 4 mCi 131I TBS: negative  12/23/14: PET CT without contrast: focal left ovarian avidity of uncertain significance (also seen on outside PET 2010)-- t  3/13/19 chest CT: stable to 2016 11/6/2020 neck US : negative.     ROS   Feels well  Weight stable ; struggling to lose weight  ? Sometimes palpitations  Joint pain - knees, ankles.   No parestheias, no cramps      Radha Mcdermott  is being evaluated via  a billable video visit.      How would you like to obtain your AVS? [employer]  For the video visit, send the invitation by: Text to cell phone: [phone]  Will anyone else be joining your video visit? No

## 2024-08-03 ENCOUNTER — HEALTH MAINTENANCE LETTER (OUTPATIENT)
Age: 59
End: 2024-08-03

## 2024-09-05 NOTE — PROGRESS NOTES
09/05/24 1:59 PM  PATIENT LAB/IMAGING STATUS : MyChart sent to patient to complete standing/future orders

## 2024-09-11 ENCOUNTER — LAB (OUTPATIENT)
Dept: LAB | Facility: CLINIC | Age: 59
End: 2024-09-11
Payer: COMMERCIAL

## 2024-09-11 DIAGNOSIS — E89.0 POSTSURGICAL HYPOTHYROIDISM: ICD-10-CM

## 2024-09-11 DIAGNOSIS — C73 PAPILLARY THYROID CARCINOMA (H): ICD-10-CM

## 2024-09-11 DIAGNOSIS — E89.2 POST-SURGICAL HYPOPARATHYROIDISM (H): ICD-10-CM

## 2024-09-11 LAB
CALCIUM SERPL-MCNC: 8.6 MG/DL (ref 8.8–10.4)
CREAT SERPL-MCNC: 1.01 MG/DL (ref 0.51–0.95)
EGFRCR SERPLBLD CKD-EPI 2021: 65 ML/MIN/1.73M2
PHOSPHATE SERPL-MCNC: 3.5 MG/DL (ref 2.5–4.5)

## 2024-09-11 PROCEDURE — 82565 ASSAY OF CREATININE: CPT

## 2024-09-11 PROCEDURE — 82310 ASSAY OF CALCIUM: CPT

## 2024-09-11 PROCEDURE — 36415 COLL VENOUS BLD VENIPUNCTURE: CPT

## 2024-09-11 PROCEDURE — 84100 ASSAY OF PHOSPHORUS: CPT

## 2024-09-30 ENCOUNTER — VIRTUAL VISIT (OUTPATIENT)
Dept: ENDOCRINOLOGY | Facility: CLINIC | Age: 59
End: 2024-09-30
Payer: COMMERCIAL

## 2024-09-30 DIAGNOSIS — C73 PAPILLARY THYROID CARCINOMA (H): ICD-10-CM

## 2024-09-30 DIAGNOSIS — E89.0 POSTSURGICAL HYPOTHYROIDISM: ICD-10-CM

## 2024-09-30 DIAGNOSIS — E89.2 POST-SURGICAL HYPOPARATHYROIDISM (H): Primary | ICD-10-CM

## 2024-09-30 PROCEDURE — 99214 OFFICE O/P EST MOD 30 MIN: CPT | Mod: 95

## 2024-09-30 PROCEDURE — G2211 COMPLEX E/M VISIT ADD ON: HCPCS | Mod: 95

## 2024-09-30 RX ORDER — LEVOTHYROXINE SODIUM 112 UG/1
112 TABLET ORAL DAILY
Qty: 90 TABLET | Refills: 4 | Status: SHIPPED | OUTPATIENT
Start: 2024-09-30

## 2024-09-30 ASSESSMENT — PAIN SCALES - GENERAL: PAINLEVEL: NO PAIN (0)

## 2024-09-30 NOTE — LETTER
9/30/2024       RE: Radha Mcdermott  [address] [facility] [address]     Dear Colleague,    Thank you for referring your patient, Radha Mcdermott, to the [facility] [place] at [facility]. Please see a copy of my visit note below.    09/05/24 1:59 PM  PATIENT LAB/IMAGING STATUS : [facility] sent to patient to complete standing/future orders       Endocrinology video visit     1.  Papillary carcinoma thyroid, multifocal, bilateral,  largest tumor left 5 cm, + ETE, multiple node positive. She been treated with total thyroidectomy and left neck dissection (in 3 operations) and also 132 mCi 131.  -   Neck US prior to next appt in 18 months   RTC 18 months with labs and US prior   Standing orders for yearly lab: Tg, TSH, free 4     Hypothyroidism due to thyroidectomy. As per # 1.  On  LT4 112 mcg/day.       surgical hypoparathyroidism (partial) since first operation -  On calcitriol 0.5 mcg/day.  She is not taking calcium and she is intolerant of dairy; she takes Mg. In the past she had symptoms on calcitriol 0.25 mcg/day.    Standing orders for labs every 6 months.    pulmonary nodules, subcm, stable on serial imaging, most recent 3/19. I am noting this as it still may be relevant to # 1     The Longitudinal plan of care for postsurgical hypothyroidism related to thyroid cancer/thyroid cancer surveillance/hypoparathyroidism was addressed during this visit. Due to added complexity of care, we will continue to support Radha , and the subsequent management of this condition(s) and with the ongoing continuity of care of this condition.    Due to the continued risks of COVID 19 transmission and to improve ease of access this visit was a video visit. The patient gave verbal consent for the visit today.    I have independently reviewed and interpreted labs, imaging as indicated.     Distant Location (provider location):  Off-site  Mode of  Communication:  Video Conference via [employer] [facility]  Chart review/prep time 1  [id number]  Visit Start time  1527   Visit Stop time  1532   _18_ minutes spent on the date of the encounter doing chart review, history and exam, documentation and further activities as noted above.    Vero Arevalo MD  Endocrinology & Diabetes.      Cc/ HPI  59  year old seen for post surgical hypothyroidism, post surgical hypoparathyroidism and Papillary thyroid cancer.   She was last seen by me 9/2023.  At that time LT4 125 x 6.5/week and we reduced to 112 mcg/day .     Thyroid cancer has been treated with 3 operations  10/7/05   Thyroidectomy - left sided PCT 5.0 x 3.0 x 2.8 cm, extending through the capsule into parathyroidal soft tissues.  - Microscopic  papillary carcinoma in the right lobe and one LN removed was positive for pCT.  Her post operative course was  complicated by hypoparathyroidism.   132 mCi 131I 11/05.     12/28/06   Left modified neck dissection.  +6/11 LN  10/22/09 selective neck dissection to include level 7 and level 4 on the left.  + metastatic PCT in 4/4  lymph nodes     We have the following tumor marker data:  9/9/05: Tg 290, JUAN DIEGO < 1, TSH   Operation # 1  10/28/05: Tg 131, JUAN DIEGO < 1, TSH 15.12  11/28/05: Tg 116, JUAN DIEGO <1, ,7  2/3/06: Tg 26.7, JUAN DIEGO < 1, TSH 14.43  6/2/06: Tg 2.2, JUAN DIEGO < 1, TSH 0.36  11/3/06: Tg 18, JUAN DIEGO < 1  Operation # 2  1/30/07: Tg 1.1, JUAN DIEGO < 1, TSH   4/17/07: Tg 2, JUAN DIEGO < 1, TSH   12/11/07: Tg 13.9, JUAN DIEGO < 1, TSH 42.02  1/8/08: Tg 2.7, JUAN DIEGO < 1, TSH   3/3/09: Tg 1.0, JUAN DIEGO < 0.4, TSH   4/24/09: LN FNAB needle wash  Tg 4667, JUAN DIEGO < 0.4  Operation # 3  1/6/10: Tg 0.12, JUAN DIEGO < 0.4, TSH < 0.03  10/17/11: Tg 0.3, JUAN DIEGO < 20,   1/20/12: Tg 3.1, JUAN DIEGO < 20  3/21/12: Tg 0.1, JUAN DIEGO < 20, TSH 0.05  4/26/12: Tg 0.1, JUAN DIEGO < 20, TSH 0.02  10/26/12: Tg 3.5, JUAN DIEGO < 20, TSH 0.02 TBS -- supposedly thyrogen was given but the results and dates don't quite fit  4/26/13: Tg 0.2, JUAN DIEGO < 20, TSH 0.01  11/8/13: Tg 3.3,  JUAN DIEGO < 20 TBS negative  2/10/14: Tg 0.1, JUAN DIEGO < 20  8/18/14: Tg 0.1, JUAN DIEGO < 20, TSH 0.00  11/21/14: Tg 4 ng/ml (athyrotic < 0.1, intact < 33 [facility]) JUAN DIEGO < 1.8,TSH 0.01  12/30/14: TSH 0.01  3/4/15; Tg 0.19, JUAN DIEGO < 0.4, TSH  <0.02 ; concurrent remeasure of 1/6/10 Tg 0.15  9/2/15: Tg 0.11, JUAN DIEGO < 0.4, TSH 0.01  5/26/16: Tg 0.27, JUAN DIEGO < 0.4, TSH 0.55  12/13/16: Tg 0.14, JUAN DIEGO < 0.4, TSH 0.07, free T4 1.32  6/13/17: Tg 0.17, JUAN DIEGO < 0.4, TSH 0.11-   9/24/18: Tg 0.18, JUAN DIEGO < 0.4, TSH 0.14 , free T 4 1.22,   10/10/18 24 hour urine calcium 80 mg/24 hours   2/4/2020: Tg < 0.1, JUAN DIEGO < 0.4, TSH 0.38, free T4 1.52, Ca 8.4, phos 3.6, creatinine 1.09, K 3.5-   10/28/2020 Ca 9, phos 4.6, creatinine 1.01, K 4.2,   4/20/2022 Tg < 0.1, JUAN DIEGO < 0.4,  TSH < 0.01, free T4 1.62, Ca 8.5, creatinine 1.13  11/28/22 Tg 0.1, JUAN DIEGO < 0.4, TSH 0.02, free T4 1.56, Ca 8, phos 3.7, creatinine 0.95   6/9/23 Ca 8.8, HgbA1c 5.7%  9/7/23 TSh 0.02, free T4 1.95, 25OHD 35  1/3/2024 Tg 0.19 (concurrent Tg 0.18 11/28/22)  JUAN DIEGO < 0.4,  TSH 0.15, free T4 1.76, Ca 8.4, phos 3.3 creatinine 1.00  1/16/24 24 hour urine calcium 10   9/11/124 Ca 8.6 creatinien 1.01        past imaging .    3/4/09 neck US: left level 7 lymph node, ? left level 4  5/27/09 PET/CT negative to my eye  10/26/12: 4 mCi 131I TBS negative  11/6/13: 4 mCi 131I TBS: negative  12/23/14: PET CT without contrast: focal left ovarian avidity of uncertain significance (also seen on outside PET 2010)-- t  3/13/19 chest CT: stable to 2016 11/6/2020 neck US : negative.     ROS   Energy low - not sure why  Sleep good  Cardiac: negative   GI: negative   Cramps occasionally - not often       Past Medical History:   Diagnosis Date     Abnormal Papanicolaou smear of cervix and cervical HPV 12/01/2003    ASCUS - repeat paps all ok.      Depressive disorder      Depressive disorder, not elsewhere classified      Dysmenorrhea      Fracture of arm age 14    right     Gestational diabetes      Papillary thyroid carcinoma  (H) 10/01/2005    5 cm, MF, + ETE, node+; 3 operations, 131I     Postsurgical hypoparathyroidism (H) 01/01/2005     Postsurgical hypothyroidism 01/01/2005     S/P coronary angiogram 12/10/2018     Unspecified contraceptive management      Past Surgical History:   Procedure Laterality Date     BIOPSY/REMOVAL, LYMPH NODE(S)  10/27/2009    Surgical resection of lymph nodes, neck.  Scar revision. [facility].     COLONOSCOPY N/A 06/02/2023    tubular adenoma, repeat 5 years     DILATION AND CURETTAGE, HYSTEROSCOPY DIAGNOSTIC, COMBINED N/A 9/11/2023    Procedure: HYSTEROSCOPY, DIAGNOSTIC, WITH DILATION AND CURETTAGE OF UTERUS;  Surgeon: Rajiv Wilburn MD;  Location:  OR     HC BIOPSY/EXCISION LYMPH NODE OPEN SUPERFICIAL  01/01/2007    removal of some more lymph nodes in the neck.       REMOVE TONSILS/ADENOIDS,12+ Y/O  01/01/1984     HC THYROIDECTOMY  10/07/2005    Total thyroidectomy.  F-[facility]. Further lymph node resection in 12/06     Current Outpatient Medications   Medication Sig Dispense Refill     calcitRIOL (ROCALTROL) 0.5 MCG capsule TAKE ONE CAPSULE BY MOUTH ONCE DAILY 90 capsule 4     docusate sodium 100 MG tablet Take 100 mg by mouth 2 times daily as needed for constipation 60 tablet 11     EPINEPHrine (ANY BX GENERIC EQUIV) 0.3 MG/0.3ML injection 2-pack Inject 0.3 mLs (0.3 mg) into the muscle once as needed for anaphylaxis 2 each 1     levothyroxine (SYNTHROID/LEVOTHROID) 112 MCG tablet Take 1 tablet (112 mcg) by mouth daily. 90 tablet 4     magnesium oxide (MAG-OX) 400 MG tablet TAKE ONE-HALF TABLET BY MOUTH TWICE A DAY 90 tablet 4     Not taking calcium       Family History   Problem Relation Age of Onset     Lipids Mother      Osteoporosis Mother      Cancer Father         skin     Dementia Father      Pacemaker Sister         ?a fib     Asthma Brother      Diabetes Maternal Grandmother      Thyroid Disease Maternal Grandmother         thyroid disease     Alzheimer Disease Paternal Grandmother       Heart Disease Paternal Grandmother      Cerebrovascular Disease Paternal Grandmother      Thyroid Disease Paternal Grandmother      Prostate Cancer Paternal Grandfather      Allergies Son         pcn     Cancer Maternal Aunt         breast - mid-30s     Diabetes Cousin      Breast Cancer Cousin      Social History     Tobacco Use     Smoking status: Former     Current packs/day: 0.00     Average packs/day: 0.5 packs/day for 16.0 years (8.0 ttl pk-yrs)     Types: Cigarettes     Start date: 3/1/1982     Quit date: 3/1/1998     Years since quittin.6     Smokeless tobacco: Never     Tobacco comments:     no smoking in the home   Vaping Use     Vaping status: Never Used   Substance Use Topics     Alcohol use: Yes     Comment: social     Drug use: No     GENERAL: no distress;  from mid chest up; sitting at desk   SKIN: Visible skin clear. No significant rash, abnormal pigmentation or lesions.  EYES: glasses; Eyes grossly normal to inspection.    NECK: no visible masses;  RESP: No audible wheeze, cough, or  increased work of breathing.    NEURO: Awake, alert, responds appropriately to questions.  Mentation and speech fluent.  PSYCH:affect normal, and appearance well-groomed.     Latest Reference Range & Units 24 09:21 24 07:55 24 08:22   Creatinine 0.51 - 0.95 mg/dL 1.00 (H)  1.01 (H)   GFR Estimate >60 mL/min/1.73m2 66  65   Calcium 8.8 - 10.4 mg/dL 8.4 (L)  8.6 (L)   Phosphorus 2.5 - 4.5 mg/dL 3.3  3.5   Calcium Urine g/24 h 0.10 - 0.30 g/spec  0.14    Calcium Urine mg/dL mg/dL  9.4    Creatinine Urine Timed 0.72 - 1.51 g/spec  1.11    Creatinine Urine mg/dL  75.4    (H): Data is abnormally high  (L): Data is abnormally low    Virtual Visit Details    Type of service:  Video Visit       Again, thank you for allowing me to participate in the care of your patient.      Sincerely,    Vero Arevalo MD

## 2024-09-30 NOTE — PATIENT INSTRUCTIONS
Calcium labs every 6 months  Thyroid labs every 12-18 months -     See me me every 1.5 years   Neck US prior to next appt in 2026

## 2024-09-30 NOTE — NURSING NOTE
Current patient location: Patient declined to provide     Is the patient currently in the state of [place]? YES    Visit mode:VIDEO    If the visit is dropped, the patient can be reconnected by: VIDEO VISIT: Send to e-mail at: [email or web address]    Will anyone else be joining the visit? NO  (If patient encounters technical issues they should call [phone])    Are changes needed to the allergy or medication list? No    Are refills needed on medications prescribed by this physician? NO    Rooming Documentation:  Questionnaire(s) not done per department protocol    Reason for visit: RECHECK Shelby Kocher VVF

## 2024-09-30 NOTE — PROGRESS NOTES
Endocrinology video visit     1.  Papillary carcinoma thyroid, multifocal, bilateral,  largest tumor left 5 cm, + ETE, multiple node positive. She been treated with total thyroidectomy and left neck dissection (in 3 operations) and also 132 mCi 131.  -   Neck US prior to next appt in 18 months   RTC 18 months with labs and US prior   Standing orders for yearly lab: Tg, TSH, free 4     Hypothyroidism due to thyroidectomy. As per # 1.  On  LT4 112 mcg/day.       surgical hypoparathyroidism (partial) since first operation -  On calcitriol 0.5 mcg/day.  She is not taking calcium and she is intolerant of dairy; she takes Mg. In the past she had symptoms on calcitriol 0.25 mcg/day.    Standing orders for labs every 6 months.    pulmonary nodules, subcm, stable on serial imaging, most recent 3/19. I am noting this as it still may be relevant to # 1     The Longitudinal plan of care for postsurgical hypothyroidism related to thyroid cancer/thyroid cancer surveillance/hypoparathyroidism was addressed during this visit. Due to added complexity of care, we will continue to support Radha , and the subsequent management of this condition(s) and with the ongoing continuity of care of this condition.    Due to the continued risks of COVID 19 transmission and to improve ease of access this visit was a video visit. The patient gave verbal consent for the visit today.    I have independently reviewed and interpreted labs, imaging as indicated.     Distant Location (provider location):  Off-site  Mode of Communication:  Video Conference via [employer]  Chart review/prep time 1  [id number]  Visit Start time  1527   Visit Stop time  1532   _18_ minutes spent on the date of the encounter doing chart review, history and exam, documentation and further activities as noted above.    Vero Arevalo MD  Endocrinology & Diabetes.      Cc/ HPI  59  year old seen for post surgical hypothyroidism, post surgical hypoparathyroidism and Papillary  thyroid cancer.   She was last seen by me 9/2023.  At that time LT4 125 x 6.5/week and we reduced to 112 mcg/day .     Thyroid cancer has been treated with 3 operations  10/7/05   Thyroidectomy - left sided PCT 5.0 x 3.0 x 2.8 cm, extending through the capsule into parathyroidal soft tissues.  - Microscopic  papillary carcinoma in the right lobe and one LN removed was positive for pCT.  Her post operative course was  complicated by hypoparathyroidism.   132 mCi 131I 11/05.     12/28/06   Left modified neck dissection.  +6/11 LN  10/22/09 selective neck dissection to include level 7 and level 4 on the left.  + metastatic PCT in 4/4  lymph nodes     We have the following tumor marker data:  9/9/05: Tg 290, JUAN DIEGO < 1, TSH   Operation # 1  10/28/05: Tg 131, JUAN DIEGO < 1, TSH 15.12  11/28/05: Tg 116, JUAN DIEGO <1, ,7  2/3/06: Tg 26.7, JUAN DIEGO < 1, TSH 14.43  6/2/06: Tg 2.2, JUAN DIEGO < 1, TSH 0.36  11/3/06: Tg 18, JUAN DIEGO < 1  Operation # 2  1/30/07: Tg 1.1, JUAN DIEGO < 1, TSH   4/17/07: Tg 2, JUAN DIEGO < 1, TSH   12/11/07: Tg 13.9, JUAN DIEGO < 1, TSH 42.02  1/8/08: Tg 2.7, JUAN DIEGO < 1, TSH   3/3/09: Tg 1.0, JUAN DIEGO < 0.4, TSH   4/24/09: LN FNAB needle wash  Tg 4667, JUAN DIEGO < 0.4  Operation # 3  1/6/10: Tg 0.12, JUAN DIEGO < 0.4, TSH < 0.03  10/17/11: Tg 0.3, JUAN DIEGO < 20,   1/20/12: Tg 3.1, JUAN DIEGO < 20  3/21/12: Tg 0.1, JUAN DIEGO < 20, TSH 0.05  4/26/12: Tg 0.1, JUAN DIEGO < 20, TSH 0.02  10/26/12: Tg 3.5, JUAN DIEGO < 20, TSH 0.02 TBS -- supposedly thyrogen was given but the results and dates don't quite fit  4/26/13: Tg 0.2, JUAN DIEGO < 20, TSH 0.01  11/8/13: Tg 3.3, JUAN DIEGO < 20 TBS negative  2/10/14: Tg 0.1, JUAN DIEGO < 20  8/18/14: Tg 0.1, JUAN DIEGO < 20, TSH 0.00  11/21/14: Tg 4 ng/ml (athyrotic < 0.1, intact < 33 [place] [facility]) JUAN DIEGO < 1.8,TSH 0.01  12/30/14: TSH 0.01  3/4/15; Tg 0.19, JUAN DIEGO < 0.4, TSH  <0.02 ; concurrent remeasure of 1/6/10 Tg 0.15  9/2/15: Tg 0.11, JUAN DIEGO < 0.4, TSH 0.01  5/26/16: Tg 0.27, JUAN DIEGO < 0.4, TSH 0.55  12/13/16: Tg 0.14, JUAN DIEGO < 0.4, TSH 0.07, free T4 1.32  6/13/17: Tg 0.17, JUAN DIEGO < 0.4, TSH 0.11-    9/24/18: Tg 0.18, JUAN DIEGO < 0.4, TSH 0.14 , free T 4 1.22,   10/10/18 24 hour urine calcium 80 mg/24 hours   2/4/2020: Tg < 0.1, JUAN DIEGO < 0.4, TSH 0.38, free T4 1.52, Ca 8.4, phos 3.6, creatinine 1.09, K 3.5-   10/28/2020 Ca 9, phos 4.6, creatinine 1.01, K 4.2,   4/20/2022 Tg < 0.1, JUAN DIEGO < 0.4,  TSH < 0.01, free T4 1.62, Ca 8.5, creatinine 1.13  11/28/22 Tg 0.1, JUAN DIEGO < 0.4, TSH 0.02, free T4 1.56, Ca 8, phos 3.7, creatinine 0.95   6/9/23 Ca 8.8, HgbA1c 5.7%  9/7/23 TSh 0.02, free T4 1.95, 25OHD 35  1/3/2024 Tg 0.19 (concurrent Tg 0.18 11/28/22)  JUAN DIEGO < 0.4,  TSH 0.15, free T4 1.76, Ca 8.4, phos 3.3 creatinine 1.00  1/16/24 24 hour urine calcium 10   9/11/124 Ca 8.6 creatinien 1.01        past imaging .    3/4/09 neck US: left level 7 lymph node, ? left level 4  5/27/09 PET/CT negative to my eye  10/26/12: 4 mCi 131I TBS negative  11/6/13: 4 mCi 131I TBS: negative  12/23/14: PET CT without contrast: focal left ovarian avidity of uncertain significance (also seen on outside PET 2010)-- t  3/13/19 chest CT: stable to 2016 11/6/2020 neck US : negative.     ROS   Energy low - not sure why  Sleep good  Cardiac: negative   GI: negative   Cramps occasionally - not often       Past Medical History:   Diagnosis Date    Abnormal Papanicolaou smear of cervix and cervical HPV 12/01/2003    ASCUS - repeat paps all ok.     Depressive disorder     Depressive disorder, not elsewhere classified     Dysmenorrhea     Fracture of arm age 14    right    Gestational diabetes     Papillary thyroid carcinoma (H) 10/01/2005    5 cm, MF, + ETE, node+; 3 operations, 131I    Postsurgical hypoparathyroidism (H) 01/01/2005    Postsurgical hypothyroidism 01/01/2005    S/P coronary angiogram 12/10/2018    Unspecified contraceptive management      Past Surgical History:   Procedure Laterality Date    BIOPSY/REMOVAL, LYMPH NODE(S)  10/27/2009    Surgical resection of lymph nodes, neck.  Scar revision. [facility].    COLONOSCOPY N/A 06/02/2023    tubular  adenoma, repeat 5 years    DILATION AND CURETTAGE, HYSTEROSCOPY DIAGNOSTIC, COMBINED N/A 9/11/2023    Procedure: HYSTEROSCOPY, DIAGNOSTIC, WITH DILATION AND CURETTAGE OF UTERUS;  Surgeon: Rajiv Wilburn MD;  Location: [facility] OR    HC BIOPSY/EXCISION LYMPH NODE OPEN SUPERFICIAL  01/01/2007    removal of some more lymph nodes in the neck.     HC REMOVE TONSILS/ADENOIDS,12+ Y/O  01/01/1984    HC THYROIDECTOMY  10/07/2005    Total thyroidectomy.  F-[facility]. Further lymph node resection in 12/06     Current Outpatient Medications   Medication Sig Dispense Refill    calcitRIOL (ROCALTROL) 0.5 MCG capsule TAKE ONE CAPSULE BY MOUTH ONCE DAILY 90 capsule 4    docusate sodium 100 MG tablet Take 100 mg by mouth 2 times daily as needed for constipation 60 tablet 11    EPINEPHrine (ANY BX GENERIC EQUIV) 0.3 MG/0.3ML injection 2-pack Inject 0.3 mLs (0.3 mg) into the muscle once as needed for anaphylaxis 2 each 1    levothyroxine (SYNTHROID/LEVOTHROID) 112 MCG tablet Take 1 tablet (112 mcg) by mouth daily. 90 tablet 4    magnesium oxide (MAG-OX) 400 MG tablet TAKE ONE-HALF TABLET BY MOUTH TWICE A DAY 90 tablet 4     Not taking calcium       Family History   Problem Relation Age of Onset    Lipids Mother     Osteoporosis Mother     Cancer Father         skin    Dementia Father     Pacemaker Sister         ?a fib    Asthma Brother     Diabetes Maternal Grandmother     Thyroid Disease Maternal Grandmother         thyroid disease    Alzheimer Disease Paternal Grandmother     Heart Disease Paternal Grandmother     Cerebrovascular Disease Paternal Grandmother     Thyroid Disease Paternal Grandmother     Prostate Cancer Paternal Grandfather     Allergies Son         pcn    Cancer Maternal Aunt         breast - mid-30s    Diabetes Cousin     Breast Cancer Cousin      Social History     Tobacco Use    Smoking status: Former     Current packs/day: 0.00     Average packs/day: 0.5 packs/day for 16.0 years (8.0 ttl pk-yrs)     Types: Cigarettes      Start date: 3/1/1982     Quit date: 3/1/1998     Years since quittin.6    Smokeless tobacco: Never    Tobacco comments:     no smoking in the home   Vaping Use    Vaping status: Never Used   Substance Use Topics    Alcohol use: Yes     Comment: social    Drug use: No     GENERAL: no distress;  from mid chest up; sitting at desk   SKIN: Visible skin clear. No significant rash, abnormal pigmentation or lesions.  EYES: glasses; Eyes grossly normal to inspection.    NECK: no visible masses;  RESP: No audible wheeze, cough, or  increased work of breathing.    NEURO: Awake, alert, responds appropriately to questions.  Mentation and speech fluent.  PSYCH:affect normal, and appearance well-groomed.     Latest Reference Range & Units 24 09:21 24 07:55 24 08:22   Creatinine 0.51 - 0.95 mg/dL 1.00 (H)  1.01 (H)   GFR Estimate >60 mL/min/1.73m2 66  65   Calcium 8.8 - 10.4 mg/dL 8.4 (L)  8.6 (L)   Phosphorus 2.5 - 4.5 mg/dL 3.3  3.5   Calcium Urine g/24 h 0.10 - 0.30 g/spec  0.14    Calcium Urine mg/dL mg/dL  9.4    Creatinine Urine Timed 0.72 - 1.51 g/spec  1.11    Creatinine Urine mg/dL  75.4    (H): Data is abnormally high  (L): Data is abnormally low

## 2024-10-01 ENCOUNTER — NURSE TRIAGE (OUTPATIENT)
Dept: FAMILY MEDICINE | Facility: CLINIC | Age: 59
End: 2024-10-01
Payer: COMMERCIAL

## 2024-10-01 NOTE — TELEPHONE ENCOUNTER
Nurse Triage SBAR    Is this a 2nd Level Triage? NO    Situation: Left knee pain     Background: Patient reports left knee pain following injury 2 weeks ago. She was playing pickle ball and tweaked her right knee initially. This pain subsided that same day and later that night her left knee became very painful and swollen.     Assessment: She reports pain is getting worse since injury.  She reports a lump to the posterior, outer left portion of knee.   This lump fluctuates in pain and size.   She reports hearing snapping noises with movement. Feels sharp and ripping/tearing sensation when activity.     She has been taking ibuprofen for pain management. Also utilizing ice, heat, elevation and compression.     Protocol Recommended Disposition:   See in Office Today or Tomorrow    Recommendation: Appointment scheduled to be seen in office tomorrow.      Does the patient meet one of the following criteria for ADS visit consideration? 16+ years old, with an [facility] PCP     Ember GONCALVES, RN       Reason for Disposition   Injury interferes with work or school    Additional Information   Negative: Major bleeding (actively dripping or spurting) that can't be stopped   Negative: Bullet, stabbed by knife or other serious penetrating wound   Negative: Looks like a dislocated joint (crooked or deformed)   Negative: Serious injury with multiple fractures (broken bones)   Negative: Sounds like a life-threatening emergency to the triager   Negative: Wound looks infected   Negative: Knee pain from overuse (e.g., sports, running, physical work)   Negative: Knee pain not from an injury   Negative: Can't stand (bear weight) or walk   Negative: Skin is split open or gaping (length > 1/2 inch or 12 mm)   Negative: Bleeding won't stop after 10 minutes of direct pressure (using correct technique)   Negative: Dirt in the wound and not removed after 15 minutes of scrubbing   Negative: Sounds like a serious injury to the triager   Negative:  "Looks infected (e.g., spreading redness, red streak, pus)   Negative: SEVERE pain (e.g., excruciating)   Negative: No prior tetanus shots (or is not fully vaccinated) and any wound (e.g., cut or scrape)   Negative: HIV positive or severe immunodeficiency (severely weak immune system) and DIRTY cut or scrape   Negative: Patient wants to be seen    Answer Assessment - Initial Assessment Questions  1. MECHANISM: \"How did the injury happen?\" (e.g., twisting injury, direct blow)       Was playing pickle ball. Tweaked right knee and kept playing, end of that night left knee was swollen and in pain.   2. ONSET: \"When did the injury happen?\" (Minutes or hours ago)       2 weeks ago  3. LOCATION: \"Where is the injury located?\"       Behind left knee  4. APPEARANCE of INJURY: \"What does the injury look like?\"       Lump, swelling, no discoloration  5. SEVERITY: \"Can you put weight on that leg?\" \"Can you walk?\"       Able to put weight, some limping  6. SIZE: For cuts, bruises, or swelling, ask: \"How large is it?\" (e.g., inches or centimeters;  entire joint)       Fluctuates in size  7. PAIN: \"Is there pain?\" If Yes, ask: \"How bad is the pain?\"  \"What does it keep you from doing?\" (e.g., Scale 1-10; or mild, moderate, severe)    -  NONE: (0): no pain    -  MILD (1-3): doesn't interfere with normal activities     -  MODERATE (4-7): interferes with normal activities (e.g., work or school) or awakens from sleep, limping     -  SEVERE (8-10): excruciating pain, unable to do any normal activities, unable to walk      Fluctuates from mild to severe, depending on the day and activity   8. TETANUS: For any breaks in the skin, ask: \"When was the last tetanus booster?\"      N/A  9. OTHER SYMPTOMS: \"Do you have any other symptoms?\"  (e.g., \"pop\" when knee injured, swelling, locking, buckling)       Not at time of injury, but is hearing snapping noises when walking  10. PREGNANCY: \"Is there any chance you are pregnant?\" \"When was your " "last menstrual period?\"        N/A    Protocols used: Knee Injury-A-OH    "

## 2024-10-02 ENCOUNTER — HOSPITAL ENCOUNTER (OUTPATIENT)
Dept: GENERAL RADIOLOGY | Facility: CLINIC | Age: 59
Discharge: HOME | End: 2024-10-02
Attending: NURSE PRACTITIONER | Admitting: NURSE PRACTITIONER
Payer: COMMERCIAL

## 2024-10-02 ENCOUNTER — OFFICE VISIT (OUTPATIENT)
Dept: FAMILY MEDICINE | Facility: CLINIC | Age: 59
End: 2024-10-02
Payer: COMMERCIAL

## 2024-10-02 VITALS
DIASTOLIC BLOOD PRESSURE: 71 MMHG | RESPIRATION RATE: 16 BRPM | TEMPERATURE: 97.7 F | BODY MASS INDEX: 31.57 KG/M2 | SYSTOLIC BLOOD PRESSURE: 114 MMHG | WEIGHT: 196.4 LBS | HEART RATE: 62 BPM | HEIGHT: 66 IN | OXYGEN SATURATION: 100 %

## 2024-10-02 DIAGNOSIS — M25.562 ACUTE PAIN OF LEFT KNEE: Primary | ICD-10-CM

## 2024-10-02 DIAGNOSIS — M25.562 ACUTE PAIN OF LEFT KNEE: ICD-10-CM

## 2024-10-02 PROCEDURE — G2211 COMPLEX E/M VISIT ADD ON: HCPCS | Performed by: NURSE PRACTITIONER

## 2024-10-02 PROCEDURE — 73562 X-RAY EXAM OF KNEE 3: CPT | Mod: LT

## 2024-10-02 PROCEDURE — 99213 OFFICE O/P EST LOW 20 MIN: CPT | Performed by: NURSE PRACTITIONER

## 2024-10-02 ASSESSMENT — PAIN SCALES - GENERAL: PAINLEVEL: MODERATE PAIN (5)

## 2024-10-02 NOTE — PROGRESS NOTES
"  Assessment & Plan     Acute pain of left knee    - XR Knee Left 3 Views; Future    Suspect possible meniscal injury. X-ray is negative. MRI ordered.   Apply ice to effected area for 15-20 minutes at least 3-4 times daily  Take ibuprofen (Advil or Motrin) 600mg every 6-8 hours or naproxen (Aleve) 2 tablets twice daily for 5-7 days   Elevate as able with pillows   Perform range of motion exercises 4 times daily     Follow up if symptoms fail to improve or worsen.    I explained my diagnostic considerations and recommendations to the patient, who voiced understanding and agreement with the assessment and treatment plan. All questions were answered to patient's apparent satisfaction. We discussed potential side effects of any prescribed or recommended therapies, as well as expectations for response to treatments and importance of lifestyle measures that may improve symptoms. Patient was advised to contact our office if there are new symptoms or no improvement or worsening of conditions or symptoms.    The longitudinal plan of care for the diagnosis(es)/condition(s) as documented were addressed during this visit. Due to the added complexity in care, I will continue to support Radha in the subsequent management and with ongoing continuity of care.      BMI  Estimated body mass index is 32.14 kg/m  as calculated from the following:    Height as of this encounter: 1.665 m (5' 5.55\").    Weight as of this encounter: 89.1 kg (196 lb 6.4 oz).             Subjective   Radha is a 59 year old, presenting for the following health issues:  Knee Injury        10/2/2024     8:43 AM   Additional Questions   Roomed by Philippe     History of Present Illness       Reason for visit:  Left knee/leg pain  Symptom onset:  1-2 weeks ago  Symptoms include:  Left knee/leg pain  Symptom intensity:  Moderate  Symptom progression:  Staying the same  Had these symptoms before:  No  What makes it worse:  Standing from sitting position  What makes it " "better:  No   She is taking medications regularly.     Radha presents today with concerns of left knee pain. She was playing pickle ball about 2 and a half weeks ago, she did not notice any specific injury or trauma. Throughout the day she noted worsening pain in the left knee along with swelling. Since then she continues to have swelling, specifically in the back of the knee. She notes clicking and a \"tearing\" sensation in the knee. She has been walking on the knee, which is painful 5/10 today. She does also have some numbness and tingling down the leg, specifically on the lateral side of the leg to the littlest toe. The pain is worsened going up or down stairs. She has been using Ibuprofen and alternating heat and ice application 3-4 times per day. She denies any previous injuries to the knee.     Patient Active Problem List   Diagnosis    Dysmenorrhea    Contraceptive management    Post-surgical hypoparathyroidism (H)    Disorder of magnesium metabolism    Papillary thyroid carcinoma (H)    Anxiety state    Postsurgical hypothyroidism    Iron deficiency anemia    CARDIOVASCULAR SCREENING; LDL GOAL LESS THAN 160    Pulmonary nodules    Abdominal mass    Shoulder joint pain    Cystocele, midline    Class 1 obesity with serious comorbidity and body mass index (BMI) of 32.0 to 32.9 in adult, unspecified obesity type    Chest pain    S/P coronary angiogram    Abnormal CT of the chest    Ground glass opacity present on imaging of lung     Current Outpatient Medications   Medication Sig Dispense Refill    calcitRIOL (ROCALTROL) 0.5 MCG capsule TAKE ONE CAPSULE BY MOUTH ONCE DAILY 90 capsule 4    docusate sodium 100 MG tablet Take 100 mg by mouth 2 times daily as needed for constipation 60 tablet 11    EPINEPHrine (ANY BX GENERIC EQUIV) 0.3 MG/0.3ML injection 2-pack Inject 0.3 mLs (0.3 mg) into the muscle once as needed for anaphylaxis 2 each 1    levothyroxine (SYNTHROID/LEVOTHROID) 112 MCG tablet Take 1 tablet (112 mcg) " "by mouth daily. 90 tablet 4    magnesium oxide (MAG-OX) 400 MG tablet TAKE ONE-HALF TABLET BY MOUTH TWICE A DAY 90 tablet 4     No current facility-administered medications for this visit.       Review of Systems  Constitutional, HEENT, cardiovascular, pulmonary, gi and gu systems are negative, except as otherwise noted.      Objective    /71   Pulse 62   Temp 97.7  F (36.5  C) (Temporal)   Resp 16   Ht 1.665 m (5' 5.55\")   Wt 89.1 kg (196 lb 6.4 oz)   LMP  (LMP Unknown)   SpO2 100%   BMI 32.14 kg/m    Body mass index is 32.14 kg/m .  Physical Exam  Vitals reviewed.   Constitutional:       General: She is not in acute distress.     Appearance: Normal appearance.   Eyes:      Extraocular Movements: Extraocular movements intact.      Conjunctiva/sclera: Conjunctivae normal.   Pulmonary:      Effort: Pulmonary effort is normal.   Musculoskeletal:      Left knee: Crepitus present. No swelling, deformity, erythema, ecchymosis or bony tenderness. Decreased range of motion. Tenderness present over the lateral joint line and LCL. Abnormal meniscus.      Instability Tests: Anterior drawer test positive. Posterior drawer test negative. Medial Karen test negative and lateral Karen test negative.   Skin:     General: Skin is warm and dry.      Capillary Refill: Capillary refill takes less than 2 seconds.   Neurological:      Mental Status: She is alert and oriented to person, place, and time. Mental status is at baseline.   Psychiatric:         Mood and Affect: Mood normal.         Behavior: Behavior normal.            XR Knee Left 3 Views    Result Date: 10/2/2024  XR KNEE LEFT 3 VIEWS 10/2/2024 9:48 AM HISTORY: Acute pain of left knee COMPARISON: None.     IMPRESSION: Negative left knee. UMBERTO ARORA MD   SYSTEM ID:  [id number]         Signed Electronically by: Kiara Blandon NP    "

## 2024-10-16 ENCOUNTER — HOSPITAL ENCOUNTER (OUTPATIENT)
Dept: MRI IMAGING | Facility: CLINIC | Age: 59
Discharge: HOME | End: 2024-10-16
Attending: NURSE PRACTITIONER | Admitting: NURSE PRACTITIONER
Payer: COMMERCIAL

## 2024-10-16 DIAGNOSIS — M25.562 ACUTE PAIN OF LEFT KNEE: ICD-10-CM

## 2024-10-16 PROCEDURE — 73721 MRI JNT OF LWR EXTRE W/O DYE: CPT | Mod: 26 | Performed by: RADIOLOGY

## 2024-10-16 PROCEDURE — 73721 MRI JNT OF LWR EXTRE W/O DYE: CPT | Mod: LT

## 2024-10-17 ENCOUNTER — MYC MEDICAL ADVICE (OUTPATIENT)
Dept: FAMILY MEDICINE | Facility: CLINIC | Age: 59
End: 2024-10-17
Payer: COMMERCIAL

## 2024-10-17 DIAGNOSIS — M25.562 ACUTE PAIN OF LEFT KNEE: Primary | ICD-10-CM

## 2024-10-22 ENCOUNTER — PATIENT OUTREACH (OUTPATIENT)
Dept: CARE COORDINATION | Facility: CLINIC | Age: 59
End: 2024-10-22
Payer: COMMERCIAL

## 2024-11-06 NOTE — PROGRESS NOTES
Radha Mcdermott  :  1965  DOS: 2024  MRN: [id number]  PCP: Jenna Rey    Sports Medicine Clinic Visit      HPI  Radha Mcdermott is a 59 year old female who is seen in consultation at the request of  Kiara Blandon N.P. presenting with left knee pain.    - Mechanism of Injury:    -  Acute on chronic. First noticed recently after playing pickleball.   - Pertinent history and prior evaluations:    - Chronic left knee pain since at least 2019. Had been referred to physical therapy at that time.     - 10/16/2024 left knee MRI shows   1. Small left knee joint effusion.  2. Mild osteoarthrosis in the left knee with areas of low grade  cartilage fissuring along the lateral patellar facet, lateral tibial  plateau, and mild-to-moderate diffuse cartilage thinning in the medial  femorotibial joint compartment.  3. Degenerative tearing of the left knee medial meniscus with free  edge fraying of the posterior horn as well as a horizontal oblique  tear.  4. The anterior and posterior cruciate ligaments, medial and lateral  supporting structures, and lateral meniscus are intact.     -10/2/2024 with Kiara Blandon NP: Left knee pain first noticed after playing pickleball. Clicking and tearing sensation.       - Pain Character:    - Location:  posterolateral left knee and anterior left knee inferior to the patella  - Character:  burning, stabbing, tearing  - Duration:  6 weeks or more  - Course:  stable  - Endorses:    -  pain, mechanical locking in extension, instability, tingling down lateral left lower leg into toes  - Denies:    - swelling  - Alleviating factors:    - heat, elevation  - Aggravating factors:    -  pivoting, prolonged sitting, incline walking  - Other treatments tried:    -  ibuprofen, ice, heat    - Patient Goals:    - discuss treatment options  - Social History:   -  inpatient pharmacy      Review of Systems  Musculoskeletal: as above  Remainder of review of systems is negative  including constitutional, CV, pulmonary, GI, Skin and Neurologic except as noted in HPI or medical history.    Past Medical History:   Diagnosis Date    Abnormal Papanicolaou smear of cervix and cervical HPV 12/01/2003    ASCUS - repeat paps all ok.     Depressive disorder     Depressive disorder, not elsewhere classified     Dysmenorrhea     Fracture of arm age 14    right    Gestational diabetes     Papillary thyroid carcinoma (H) 10/01/2005    5 cm, MF, + ETE, node+; 3 operations, 131I    Postsurgical hypoparathyroidism (H) 01/01/2005    Postsurgical hypothyroidism 01/01/2005    S/P coronary angiogram 12/10/2018    Unspecified contraceptive management      Past Surgical History:   Procedure Laterality Date    BIOPSY/REMOVAL, LYMPH NODE(S)  10/27/2009    Surgical resection of lymph nodes, neck.  Scar revision. [facility].    COLONOSCOPY N/A 06/02/2023    tubular adenoma, repeat 5 years    DILATION AND CURETTAGE, HYSTEROSCOPY DIAGNOSTIC, COMBINED N/A 9/11/2023    Procedure: HYSTEROSCOPY, DIAGNOSTIC, WITH DILATION AND CURETTAGE OF UTERUS;  Surgeon: Rajiv Wilburn MD;  Location:  OR     BIOPSY/EXCISION LYMPH NODE OPEN SUPERFICIAL  01/01/2007    removal of some more lymph nodes in the neck.     HC REMOVE TONSILS/ADENOIDS,12+ Y/O  01/01/1984    HC THYROIDECTOMY  10/07/2005    Total thyroidectomy.  F[facility]. Further lymph node resection in 12/06     Family History   Problem Relation Age of Onset    Lipids Mother     Osteoporosis Mother     Cancer Father         skin    Dementia Father     Pacemaker Sister         ?a fib    Asthma Brother     Diabetes Maternal Grandmother     Thyroid Disease Maternal Grandmother         thyroid disease    Alzheimer Disease Paternal Grandmother     Heart Disease Paternal Grandmother     Cerebrovascular Disease Paternal Grandmother     Thyroid Disease Paternal Grandmother     Prostate Cancer Paternal Grandfather     Allergies Son         pcn    Cancer Maternal Aunt         breast -  mid-30s    Diabetes Cousin     Breast Cancer Cousin          Objective  Wt 88.9 kg (196 lb)   LMP  (LMP Unknown)   BMI 32.07 kg/m      General: healthy, alert and in no acute distress.    HEENT: no scleral icterus or conjunctival erythema.   Skin: no suspicious lesions or rash. No jaundice.   CV: regular rhythm by palpation, 2+ distal pulses.  Resp: normal respiratory effort without conversational dyspnea.   Psych: normal mood and affect.    Gait: nonantalgic, appropriate coordination and balance.     Neuro:        - Sensation to light touch:    - Intact throughout the BLE including all peripheral nerve distributions.        - MSR:      RLE  LLE  - Patella 2+ 2+  - Achilles 2+ 2+       - Special tests:   - Slump/SLR:  Neg    MSK - Knee:       - Inspection:    - No significant effusion, erythema, warmth, ecchymosis, lesion.        - ROM:    - Full AROM/PROM with pain during knee flexion/extension.        - Palpation:    - TTP at the medial joint line.  - NTTP elsewhere.        - Strength:  (*antalgic)  - Hip Flexion  5     - Hip Abduction 5    - Hip Adduction 5   - Knee Flexion  5   - Knee Extension 5   - Dorsiflexion  5   - Plantarflexion 5   - Ext. Karan. Longus 5   - Inversion  5   - Eversion  5        - Special tests:        - Lachman:  Neg         - A/P drawer:  Neg        - Pivot shift:  Neg    - Karen:  Neg      - Varus stress:  Neg for laxity or pain     - Valgus stress:  Neg for laxity or pain    - Patellar grind: Positive   - Thessaly:  Neg       Radiology  I independently reviewed the available relevant imaging in the chart with my interpretations as above in HPI.       Assessment  1. Tear of medial meniscus of left knee, current, unspecified tear type, initial encounter    2. Primary osteoarthritis of left knee        Plan  Radha Mcdermott is a pleasant 59 year old female that presents with acute on chronic left knee pain.  She describes most of her pain in the lateral compartment of the knee that  hurts worse with weightbearing activities.  She did have a recent MRI that confirmed mild to moderate chondromalacia within the joint as well as a medial meniscus tear.  Based on the primarily lateral knee pain she is having, her meniscus tear may be contributing somewhat to her pain, but her osteoarthritis is mostly affecting her.  Her knee is entirely stable on exam today.  History and physical exam appear most consistent with primary osteoarthritis of the left knee.     We discussed the nature of the condition and available treatment options, and mutually agreed upon the following plan:    - Imaging:          - Reviewed and independently interpreted the relevant imaging in the chart, including any imaging ordered for today's clinic.  - Reviewed results and images with patient.   - Medications:          - Discussed pharmacologic options for pain relief.   - May use NSAIDs (Ibuprofen, Naproxen) or Acetaminophen (Tylenol) as needed for pain control.   - Do not take these if previously advised to avoid them for other medical conditions.  - May also use topical medications such as lidocaine, IcyHot, BioFreeze, or Voltaren gel as needed for pain control.    - Voltaren gel is an anti-inflammatory cream that may be used up to 4 times per day over the painful area.   - Injections:          - Discussed possible injection options and alternatives.    - Injection options include: Intra-articular knee joint injection with corticosteroid, viscosupplementation, or PRP.    - Deferred injections today and will consider them in the future as needed.   - Therapy:          - Discussed the benefits of therapy vs home exercise program for optimization of range of motion, flexibility, strength, stability and function.   - Preference is for therapy.   -Physical therapy referral placed today and instructed to call [phone] to schedule appointments.   - Modalities:          - May use ice, heat, massage or other modalities as needed.    - Bracing:          - Discussed bracing options and recommend using a hinged knee stability brace during ambulation as needed for comfort and stability.  Brace given today in clinic..    - Activity:          - Encouraged to remain active and participate in regular activities as symptoms allow.   Avoid or modify exacerbating activities as needed.  - Follow up:          - As needed for re-evaluation and update to treatment plan.  - May follow up sooner for new/worsening symptoms.  - May contact clinic by phone or MyChart for questions or concerns.       Philipp Acevedo DO, ERLINDA  [facility]  [facility] - Department of Orthopedic Surgery       Disclaimer:  This note was prepared and written using Dragon Medical dictation software. As a result, there may be errors in the script that have gone undetected. Please consider this when interpreting the information in this note.

## 2024-11-07 ENCOUNTER — OFFICE VISIT (OUTPATIENT)
Dept: ORTHOPEDICS | Facility: CLINIC | Age: 59
End: 2024-11-07
Attending: NURSE PRACTITIONER
Payer: COMMERCIAL

## 2024-11-07 VITALS — WEIGHT: 196 LBS | BODY MASS INDEX: 32.07 KG/M2

## 2024-11-07 DIAGNOSIS — S83.242A TEAR OF MEDIAL MENISCUS OF LEFT KNEE, CURRENT, UNSPECIFIED TEAR TYPE, INITIAL ENCOUNTER: Primary | ICD-10-CM

## 2024-11-07 DIAGNOSIS — M17.12 PRIMARY OSTEOARTHRITIS OF LEFT KNEE: ICD-10-CM

## 2024-11-07 PROCEDURE — 99204 OFFICE O/P NEW MOD 45 MIN: CPT | Performed by: STUDENT IN AN ORGANIZED HEALTH CARE EDUCATION/TRAINING PROGRAM

## 2024-11-07 ASSESSMENT — PAIN SCALES - GENERAL: PAINLEVEL_OUTOF10: MODERATE PAIN (5)

## 2024-11-07 NOTE — LETTER
2024      Radha Mcdermott  [address] [place] [id number]      Dear Colleague,    Thank you for referring your patient, Radha Mcdermott, to the [facility]. Please see a copy of my visit note below.    Radha Mcdermott  :  1965  DOS: 2024  MRN: [id number]  PCP: Jenna Rey    Sports Medicine Clinic Visit      HPI  Radha Mcdermott is a 59 year old female who is seen in consultation at the request of  Kiara Blandon N.P. presenting with left knee pain.    - Mechanism of Injury:    -  Acute on chronic. First noticed recently after playing pickleball.   - Pertinent history and prior evaluations:    - Chronic left knee pain since at least . Had been referred to physical therapy at that time.     - 10/16/2024 left knee MRI shows   1. Small left knee joint effusion.  2. Mild osteoarthrosis in the left knee with areas of low grade  cartilage fissuring along the lateral patellar facet, lateral tibial  plateau, and mild-to-moderate diffuse cartilage thinning in the medial  femorotibial joint compartment.  3. Degenerative tearing of the left knee medial meniscus with free  edge fraying of the posterior horn as well as a horizontal oblique  tear.  4. The anterior and posterior cruciate ligaments, medial and lateral  supporting structures, and lateral meniscus are intact.     -10/2/2024 with Kiara Blandon NP: Left knee pain first noticed after playing pickleball. Clicking and tearing sensation.       - Pain Character:    - Location:  posterolateral left knee and anterior left knee inferior to the patella  - Character:  burning, stabbing, tearing  - Duration:  6 weeks or more  - Course:  stable  - Endorses:    -  pain, mechanical locking in extension, instability, tingling down lateral left lower leg into toes  - Denies:    - swelling  - Alleviating factors:    - heat, elevation  - Aggravating factors:    -  pivoting, prolonged sitting,  incline walking  - Other treatments tried:    -  ibuprofen, ice, heat    - Patient Goals:    - discuss treatment options  - Social History:   -  inpatient pharmacy      Review of Systems  Musculoskeletal: as above  Remainder of review of systems is negative including constitutional, CV, pulmonary, GI, Skin and Neurologic except as noted in HPI or medical history.    Past Medical History:   Diagnosis Date     Abnormal Papanicolaou smear of cervix and cervical HPV 12/01/2003    ASCUS - repeat paps all ok.      Depressive disorder      Depressive disorder, not elsewhere classified      Dysmenorrhea      Fracture of arm age 14    right     Gestational diabetes      Papillary thyroid carcinoma (H) 10/01/2005    5 cm, MF, + ETE, node+; 3 operations, 131I     Postsurgical hypoparathyroidism (H) 01/01/2005     Postsurgical hypothyroidism 01/01/2005     S/P coronary angiogram 12/10/2018     Unspecified contraceptive management      Past Surgical History:   Procedure Laterality Date     BIOPSY/REMOVAL, LYMPH NODE(S)  10/27/2009    Surgical resection of lymph nodes, neck.  Scar revision. [facility].     COLONOSCOPY N/A 06/02/2023    tubular adenoma, repeat 5 years     DILATION AND CURETTAGE, HYSTEROSCOPY DIAGNOSTIC, COMBINED N/A 9/11/2023    Procedure: HYSTEROSCOPY, DIAGNOSTIC, WITH DILATION AND CURETTAGE OF UTERUS;  Surgeon: Rajiv Wilburn MD;  Location:  OR      BIOPSY/EXCISION LYMPH NODE OPEN SUPERFICIAL  01/01/2007    removal of some more lymph nodes in the neck.       REMOVE TONSILS/ADENOIDS,12+ Y/O  01/01/1984     HC THYROIDECTOMY  10/07/2005    Total thyroidectomy.  [facility]. Further lymph node resection in 12/06     Family History   Problem Relation Age of Onset     Lipids Mother      Osteoporosis Mother      Cancer Father         skin     Dementia Father      Pacemaker Sister         ?a fib     Asthma Brother      Diabetes Maternal Grandmother      Thyroid Disease Maternal Grandmother         thyroid disease      Alzheimer Disease Paternal Grandmother      Heart Disease Paternal Grandmother      Cerebrovascular Disease Paternal Grandmother      Thyroid Disease Paternal Grandmother      Prostate Cancer Paternal Grandfather      Allergies Son         pcn     Cancer Maternal Aunt         breast - mid-30s     Diabetes Cousin      Breast Cancer Cousin          Objective  Wt 88.9 kg (196 lb)   LMP  (LMP Unknown)   BMI 32.07 kg/m      General: healthy, alert and in no acute distress.    HEENT: no scleral icterus or conjunctival erythema.   Skin: no suspicious lesions or rash. No jaundice.   CV: regular rhythm by palpation, 2+ distal pulses.  Resp: normal respiratory effort without conversational dyspnea.   Psych: normal mood and affect.    Gait: nonantalgic, appropriate coordination and balance.     Neuro:        - Sensation to light touch:    - Intact throughout the BLE including all peripheral nerve distributions.        - MSR:      RLE  LLE  - Patella 2+ 2+  - Achilles 2+ 2+       - Special tests:   - Slump/SLR:  Neg    MSK - Knee:       - Inspection:    - No significant effusion, erythema, warmth, ecchymosis, lesion.        - ROM:    - Full AROM/PROM with pain during knee flexion/extension.        - Palpation:    - TTP at the medial joint line.  - NTTP elsewhere.        - Strength:  (*antalgic)  - Hip Flexion  5     - Hip Abduction 5    - Hip Adduction 5   - Knee Flexion  5   - Knee Extension 5   - Dorsiflexion  5   - Plantarflexion 5   - Ext. Karan. Longus 5   - Inversion  5   - Eversion  5        - Special tests:        - Lachman:  Neg         - A/P drawer:  Neg        - Pivot shift:  Neg    - Karen:  Neg      - Varus stress:  Neg for laxity or pain     - Valgus stress:  Neg for laxity or pain    - Patellar grind: Positive   - Thessaly:  Neg       Radiology  I independently reviewed the available relevant imaging in the chart with my interpretations as above in HPI.       Assessment  1. Tear of medial meniscus of left  knee, current, unspecified tear type, initial encounter    2. Primary osteoarthritis of left knee        Plan  Radha Mcdermott is a pleasant 59 year old female that presents with acute on chronic left knee pain.  She describes most of her pain in the lateral compartment of the knee that hurts worse with weightbearing activities.  She did have a recent MRI that confirmed mild to moderate chondromalacia within the joint as well as a medial meniscus tear.  Based on the primarily lateral knee pain she is having, her meniscus tear may be contributing somewhat to her pain, but her osteoarthritis is mostly affecting her.  Her knee is entirely stable on exam today.  History and physical exam appear most consistent with primary osteoarthritis of the left knee.     We discussed the nature of the condition and available treatment options, and mutually agreed upon the following plan:    - Imaging:          - Reviewed and independently interpreted the relevant imaging in the chart, including any imaging ordered for today's clinic.  - Reviewed results and images with patient.   - Medications:          - Discussed pharmacologic options for pain relief.   - May use NSAIDs (Ibuprofen, Naproxen) or Acetaminophen (Tylenol) as needed for pain control.   - Do not take these if previously advised to avoid them for other medical conditions.  - May also use topical medications such as lidocaine, IcyHot, BioFreeze, or Voltaren gel as needed for pain control.    - Voltaren gel is an anti-inflammatory cream that may be used up to 4 times per day over the painful area.   - Injections:          - Discussed possible injection options and alternatives.    - Injection options include: Intra-articular knee joint injection with corticosteroid, viscosupplementation, or PRP.    - Deferred injections today and will consider them in the future as needed.   - Therapy:          - Discussed the benefits of therapy vs home exercise program for optimization  of range of motion, flexibility, strength, stability and function.   - Preference is for therapy.   -Physical therapy referral placed today and instructed to call [phone] to schedule appointments.   - Modalities:          - May use ice, heat, massage or other modalities as needed.   - Bracing:          - Discussed bracing options and recommend using a hinged knee stability brace during ambulation as needed for comfort and stability.  Brace given today in clinic..    - Activity:          - Encouraged to remain active and participate in regular activities as symptoms allow.   Avoid or modify exacerbating activities as needed.  - Follow up:          - As needed for re-evaluation and update to treatment plan.  - May follow up sooner for new/worsening symptoms.  - May contact clinic by phone or MyChart for questions or concerns.       Philipp Acevedo DO, CAQSM  [facility]  [facility] - Department of Orthopedic Surgery       Disclaimer:  This note was prepared and written using Dragon Medical dictation software. As a result, there may be errors in the script that have gone undetected. Please consider this when interpreting the information in this note.        Again, thank you for allowing me to participate in the care of your patient.        Sincerely,        Philipp Acevedo DO

## 2024-11-08 ENCOUNTER — HOSPITAL ENCOUNTER (OUTPATIENT)
Dept: MAMMOGRAPHY | Facility: CLINIC | Age: 59
Discharge: HOME | End: 2024-11-08
Attending: FAMILY MEDICINE | Admitting: FAMILY MEDICINE
Payer: COMMERCIAL

## 2024-11-08 DIAGNOSIS — Z12.31 VISIT FOR SCREENING MAMMOGRAM: ICD-10-CM

## 2024-11-08 PROCEDURE — 77063 BREAST TOMOSYNTHESIS BI: CPT

## 2024-11-13 ENCOUNTER — HOSPITAL ENCOUNTER (OUTPATIENT)
Dept: ULTRASOUND IMAGING | Facility: CLINIC | Age: 59
Discharge: HOME | End: 2024-11-13
Attending: FAMILY MEDICINE | Admitting: FAMILY MEDICINE
Payer: COMMERCIAL

## 2024-11-13 DIAGNOSIS — R92.8 ABNORMAL MAMMOGRAM: ICD-10-CM

## 2024-11-13 PROCEDURE — 76642 ULTRASOUND BREAST LIMITED: CPT | Mod: RT

## 2024-12-04 ENCOUNTER — THERAPY VISIT (OUTPATIENT)
Dept: PHYSICAL THERAPY | Facility: CLINIC | Age: 59
End: 2024-12-04
Attending: NURSE PRACTITIONER
Payer: COMMERCIAL

## 2024-12-04 DIAGNOSIS — S83.242A TEAR OF MEDIAL MENISCUS OF LEFT KNEE, CURRENT, UNSPECIFIED TEAR TYPE, INITIAL ENCOUNTER: ICD-10-CM

## 2024-12-04 DIAGNOSIS — M17.12 PRIMARY OSTEOARTHRITIS OF LEFT KNEE: ICD-10-CM

## 2024-12-04 DIAGNOSIS — M25.562 ACUTE PAIN OF LEFT KNEE: ICD-10-CM

## 2024-12-04 PROCEDURE — 97110 THERAPEUTIC EXERCISES: CPT | Mod: GP | Performed by: PHYSICAL THERAPIST

## 2024-12-04 PROCEDURE — 97161 PT EVAL LOW COMPLEX 20 MIN: CPT | Mod: GP | Performed by: PHYSICAL THERAPIST

## 2024-12-04 ASSESSMENT — ACTIVITIES OF DAILY LIVING (ADL)
HOW_WOULD_YOU_RATE_THE_OVERALL_FUNCTION_OF_YOUR_KNEE_DURING_YOUR_USUAL_DAILY_ACTIVITIES?: ABNORMAL
GIVING WAY, BUCKLING OR SHIFTING OF KNEE: THE SYMPTOM AFFECTS MY ACTIVITY MODERATELY
GO DOWN STAIRS: ACTIVITY IS SOMEWHAT DIFFICULT
WALK: ACTIVITY IS SOMEWHAT DIFFICULT
AS_A_RESULT_OF_YOUR_KNEE_INJURY,_HOW_WOULD_YOU_RATE_YOUR_CURRENT_LEVEL_OF_DAILY_ACTIVITY?: ABNORMAL
SIT WITH YOUR KNEE BENT: ACTIVITY IS SOMEWHAT DIFFICULT
WEAKNESS: THE SYMPTOM AFFECTS MY ACTIVITY MODERATELY
GO DOWN STAIRS: ACTIVITY IS SOMEWHAT DIFFICULT
STIFFNESS: THE SYMPTOM AFFECTS MY ACTIVITY MODERATELY
RISE FROM A CHAIR: ACTIVITY IS SOMEWHAT DIFFICULT
GIVING WAY, BUCKLING OR SHIFTING OF KNEE: THE SYMPTOM AFFECTS MY ACTIVITY MODERATELY
HOW_WOULD_YOU_RATE_THE_OVERALL_FUNCTION_OF_YOUR_KNEE_DURING_YOUR_USUAL_DAILY_ACTIVITIES?: ABNORMAL
HOW_WOULD_YOU_RATE_THE_CURRENT_FUNCTION_OF_YOUR_KNEE_DURING_YOUR_USUAL_DAILY_ACTIVITIES_ON_A_SCALE_FROM_0_TO_100_WITH_100_BEING_YOUR_LEVEL_OF_KNEE_FUNCTION_PRIOR_TO_YOUR_INJURY_AND_0_BEING_THE_INABILITY_TO_PERFORM_ANY_OF_YOUR_USUAL_DAILY_ACTIVITIES?: 75
KNEE_ACTIVITY_OF_DAILY_LIVING_SCORE: 42.86
AS_A_RESULT_OF_YOUR_KNEE_INJURY,_HOW_WOULD_YOU_RATE_YOUR_CURRENT_LEVEL_OF_DAILY_ACTIVITY?: ABNORMAL
STIFFNESS: THE SYMPTOM AFFECTS MY ACTIVITY MODERATELY
SWELLING: THE SYMPTOM AFFECTS MY ACTIVITY MODERATELY
KNEEL ON THE FRONT OF YOUR KNEE: I AM UNABLE TO DO THE ACTIVITY
LIMPING: THE SYMPTOM AFFECTS MY ACTIVITY MODERATELY
LIMPING: THE SYMPTOM AFFECTS MY ACTIVITY MODERATELY
PAIN: THE SYMPTOM AFFECTS MY ACTIVITY MODERATELY
GO UP STAIRS: ACTIVITY IS SOMEWHAT DIFFICULT
WEAKNESS: THE SYMPTOM AFFECTS MY ACTIVITY MODERATELY
STAND: ACTIVITY IS SOMEWHAT DIFFICULT
KNEE_ACTIVITY_OF_DAILY_LIVING_SUM: 30
WALK: ACTIVITY IS SOMEWHAT DIFFICULT
SIT WITH YOUR KNEE BENT: ACTIVITY IS SOMEWHAT DIFFICULT
KNEEL ON THE FRONT OF YOUR KNEE: I AM UNABLE TO DO THE ACTIVITY
SWELLING: THE SYMPTOM AFFECTS MY ACTIVITY MODERATELY
SQUAT: I AM UNABLE TO DO THE ACTIVITY
PAIN: THE SYMPTOM AFFECTS MY ACTIVITY MODERATELY
RAW_SCORE: 30
GO UP STAIRS: ACTIVITY IS SOMEWHAT DIFFICULT
PLEASE_INDICATE_YOR_PRIMARY_REASON_FOR_REFERRAL_TO_THERAPY:: KNEE
RISE FROM A CHAIR: ACTIVITY IS SOMEWHAT DIFFICULT
STAND: ACTIVITY IS SOMEWHAT DIFFICULT
SQUAT: I AM UNABLE TO DO THE ACTIVITY
HOW_WOULD_YOU_RATE_THE_CURRENT_FUNCTION_OF_YOUR_KNEE_DURING_YOUR_USUAL_DAILY_ACTIVITIES_ON_A_SCALE_FROM_0_TO_100_WITH_100_BEING_YOUR_LEVEL_OF_KNEE_FUNCTION_PRIOR_TO_YOUR_INJURY_AND_0_BEING_THE_INABILITY_TO_PERFORM_ANY_OF_YOUR_USUAL_DAILY_ACTIVITIES?: 75

## 2024-12-04 NOTE — PROGRESS NOTES
PHYSICAL THERAPY EVALUATION  Type of Visit: Evaluation       Fall Risk Screen:  Fall screen completed by: PT  Have you fallen 2 or more times in the past year?: No  Have you fallen and had an injury in the past year?: No  Is patient a fall risk?: No    Subjective         Presenting condition or subjective complaint: (Patient-Rptd) Torn meniscus and knee pain L. She did play pickleball a couple of hours but doesn't recall an injury but later that night had L knee swelling and posterolateral knee pain. Over the last 3 weeks has developed LBP. Has been taking IBP for sx at times. Wearing L knee brace. Not doing any L knee exercises. Has a hx of LBP dating back 30 years.   Date of onset: 09/18/24 (approximately)    Relevant medical history:     Dates & types of surgery:      Prior diagnostic imaging/testing results: (Patient-Rptd) MRI; X-ray    X ray negative                                                                IMPRESSION MRI:  1. Small left knee joint effusion.     2. Mild osteoarthrosis in the left knee with areas of low grade  cartilage fissuring along the lateral patellar facet, lateral tibial  plateau, and mild-to-moderate diffuse cartilage thinning in the medial  femorotibial joint compartment.     3. Degenerative tearing of the left knee medial meniscus with free  edge fraying of the posterior horn as well as a horizontal oblique  tear.     4. The anterior and posterior cruciate ligaments, medial and lateral  supporting structures, and lateral meniscus are intact.   Prior therapy history for the same diagnosis, illness or injury: (Patient-Rptd) No      Prior Level of Function  Transfers: Independent  Ambulation: Independent  ADL: Independent  IADL: Childcare, Driving, Finances, Housekeeping, Laundry, Meal preparation, Medication management, Work, Yard work    Living Environment  Social support: (Patient-Rptd) With a significant other or spouse   Type of home: (Patient-Rptd) House   Stairs to enter the  home: (Patient-Rptd) Yes (Patient-Rptd) 1 Is there a railing: (Patient-Rptd) No     Ramp: (Patient-Rptd) No   Stairs inside the home: (Patient-Rptd) Yes (Patient-Rptd) 8 Is there a railing: (Patient-Rptd) Yes     Help at home: (Patient-Rptd) None  Equipment owned:       Employment: (Patient-Rptd) Yes (Patient-Rptd) Pharmacy tech  Hobbies/Interests:  was playing [email or web address], [employer], [email or web address], TV    Patient goals for therapy: (Patient-Rptd) Walk normal, not feel pain    Pain assessment: Pain present  See objective evaluation for additional pain details  In sitting today 4/10 L knee pain greatest medially, size of a raquetball. No LBP. No change in sx with lumbar roll in sitting.      Objective   KNEE EVALUATION  PAIN: Pain Level at Rest: 4/10  Pain Level with Use: 8/10  Pain Location: knee and also gets intermittent LBP, some L lateral foot/toe numbness  Pain Frequency: constant  Pain is Exacerbated By: sitting, standing, adls, see KOS  Pain is Relieved By: cold and rest  Pain Progression: Unchanged  INTEGUMENTARY (edema, incisions):  Pt with L quad atrophy, L knee swollen  POSTURE:  Fair sitting posture, slight kyphotic lumbar spine, in standing L knee flexed 20 degrees, decreased L LE WB  GAIT:  Weightbearing Status: WBAT  Assistive Device(s): None  Gait Deviations: Antalgic  Zuleika decreased  Not getting full L knee extension in stance, limp present  BALANCE/PROPRIOCEPTION:   WEIGHTBEARING ALIGNMENT:   NON-WEIGHTBEARING ALIGNMENT:   ROM:  AROM knee extension to flexion R 3 degrees hyperextension to 142 degrees flexion, L 10 to 131 degrees  STRENGTH:  poor L quad activation, 20 degree lag with L SLR  FLEXIBILITY:   SPECIAL TESTS:   FUNCTIONAL TESTS:   PALPATION:   JOINT MOBILITY:     Assessment & Plan   CLINICAL IMPRESSIONS  Medical Diagnosis: Primary osteoarthritis of left knee, Acute pain of L knee, Tear of medial meniscus of left knee, current, unspecified tear type, initial encounter    Treatment Diagnosis:  Left knee pain   Impression/Assessment: Patient is a 59 year old female with L knee pain, OA, meniscal tear complaints.  The following significant findings have been identified: Pain, Decreased ROM/flexibility, Decreased strength, Impaired gait, Impaired muscle performance, Decreased activity tolerance, and Impaired posture. These impairments interfere with their ability to perform self care tasks, work tasks, recreational activities, household chores, household mobility, and community mobility as compared to previous level of function.     Clinical Decision Making (Complexity):  Clinical Presentation: Stable/Uncomplicated  Clinical Presentation Rationale: based on medical and personal factors listed in PT evaluation  Clinical Decision Making (Complexity): Low complexity    PLAN OF CARE  Treatment Interventions:  Modalities: E-stim, if needed  Interventions: Gait Training, Manual Therapy, Neuromuscular Re-education, Therapeutic Activity, Therapeutic Exercise    Long Term Goals     PT Goal 1  Goal Identifier: ROM  Goal Description: Pt will improve L knee AROM extension to flexion from 10 to 131 degrees to near 0 to 136 degrees or better so pt can have full extension with stance phase of gait and better ROM with adls  Target Date: 01/15/25  PT Goal 2  Goal Identifier: Function  Goal Description: pt will note a decrease in L knee pain and improve ROM and strength of L knee to carry over to improved functional level as measured by improving KOS score from 42.86% to 75% or better  Target Date: 03/04/25      Frequency of Treatment: 1x per week to every 2 weeks  Duration of Treatment: 3 months, decrease as able    Recommended Referrals to Other Professionals:   Education Assessment:   Learner/Method: Patient;Listening;Reading;Demonstration;Pictures/Video;No Barriers to Learning  Education Comments: home program    Risks and benefits of evaluation/treatment have been explained.   Patient/Family/caregiver agrees with Plan of  Care.     Evaluation Time:     PT Dalia, Low Complexity Minutes (17318): 25       Signing Clinician: Cl Erickson PT

## 2024-12-16 ENCOUNTER — THERAPY VISIT (OUTPATIENT)
Dept: PHYSICAL THERAPY | Facility: CLINIC | Age: 59
End: 2024-12-16
Attending: NURSE PRACTITIONER
Payer: COMMERCIAL

## 2024-12-16 DIAGNOSIS — M17.12 PRIMARY OSTEOARTHRITIS OF LEFT KNEE: ICD-10-CM

## 2024-12-16 DIAGNOSIS — M25.562 ACUTE PAIN OF LEFT KNEE: Primary | ICD-10-CM

## 2024-12-16 DIAGNOSIS — S83.242A TEAR OF MEDIAL MENISCUS OF LEFT KNEE, CURRENT, UNSPECIFIED TEAR TYPE, INITIAL ENCOUNTER: ICD-10-CM

## 2024-12-16 PROCEDURE — 97110 THERAPEUTIC EXERCISES: CPT | Mod: GP | Performed by: PHYSICAL THERAPIST

## 2025-01-15 ENCOUNTER — THERAPY VISIT (OUTPATIENT)
Dept: PHYSICAL THERAPY | Facility: CLINIC | Age: 60
End: 2025-01-15
Attending: NURSE PRACTITIONER
Payer: COMMERCIAL

## 2025-01-15 DIAGNOSIS — S83.242A TEAR OF MEDIAL MENISCUS OF LEFT KNEE, CURRENT, UNSPECIFIED TEAR TYPE, INITIAL ENCOUNTER: ICD-10-CM

## 2025-01-15 DIAGNOSIS — M17.12 PRIMARY OSTEOARTHRITIS OF LEFT KNEE: ICD-10-CM

## 2025-01-15 DIAGNOSIS — M25.562 ACUTE PAIN OF LEFT KNEE: Primary | ICD-10-CM

## 2025-01-15 PROCEDURE — 97110 THERAPEUTIC EXERCISES: CPT | Mod: GP | Performed by: PHYSICAL THERAPIST

## 2025-01-15 PROCEDURE — 97530 THERAPEUTIC ACTIVITIES: CPT | Mod: GP | Performed by: PHYSICAL THERAPIST

## 2025-02-06 ENCOUNTER — THERAPY VISIT (OUTPATIENT)
Dept: PHYSICAL THERAPY | Facility: CLINIC | Age: 60
End: 2025-02-06
Attending: NURSE PRACTITIONER
Payer: COMMERCIAL

## 2025-02-06 DIAGNOSIS — M17.12 PRIMARY OSTEOARTHRITIS OF LEFT KNEE: ICD-10-CM

## 2025-02-06 DIAGNOSIS — M25.562 ACUTE PAIN OF LEFT KNEE: Primary | ICD-10-CM

## 2025-02-06 DIAGNOSIS — S83.242A TEAR OF MEDIAL MENISCUS OF LEFT KNEE, CURRENT, UNSPECIFIED TEAR TYPE, INITIAL ENCOUNTER: ICD-10-CM

## 2025-02-06 PROCEDURE — 97530 THERAPEUTIC ACTIVITIES: CPT | Mod: GP | Performed by: PHYSICAL THERAPIST

## 2025-02-06 PROCEDURE — 97110 THERAPEUTIC EXERCISES: CPT | Mod: GP | Performed by: PHYSICAL THERAPIST

## 2025-02-06 PROCEDURE — 97112 NEUROMUSCULAR REEDUCATION: CPT | Mod: GP | Performed by: PHYSICAL THERAPIST

## 2025-02-20 ENCOUNTER — THERAPY VISIT (OUTPATIENT)
Dept: PHYSICAL THERAPY | Facility: CLINIC | Age: 60
End: 2025-02-20
Attending: NURSE PRACTITIONER
Payer: COMMERCIAL

## 2025-02-20 DIAGNOSIS — S83.242A TEAR OF MEDIAL MENISCUS OF LEFT KNEE, CURRENT, UNSPECIFIED TEAR TYPE, INITIAL ENCOUNTER: ICD-10-CM

## 2025-02-20 DIAGNOSIS — M17.12 PRIMARY OSTEOARTHRITIS OF LEFT KNEE: ICD-10-CM

## 2025-02-20 DIAGNOSIS — M25.562 ACUTE PAIN OF LEFT KNEE: Primary | ICD-10-CM

## 2025-02-20 PROCEDURE — 97140 MANUAL THERAPY 1/> REGIONS: CPT | Mod: GP | Performed by: PHYSICAL THERAPIST

## 2025-02-20 PROCEDURE — 97110 THERAPEUTIC EXERCISES: CPT | Mod: GP | Performed by: PHYSICAL THERAPIST

## 2025-03-20 ENCOUNTER — THERAPY VISIT (OUTPATIENT)
Dept: PHYSICAL THERAPY | Facility: CLINIC | Age: 60
End: 2025-03-20
Attending: NURSE PRACTITIONER
Payer: COMMERCIAL

## 2025-03-20 DIAGNOSIS — M17.12 PRIMARY OSTEOARTHRITIS OF LEFT KNEE: ICD-10-CM

## 2025-03-20 DIAGNOSIS — S83.242A TEAR OF MEDIAL MENISCUS OF LEFT KNEE, CURRENT, UNSPECIFIED TEAR TYPE, INITIAL ENCOUNTER: ICD-10-CM

## 2025-03-20 DIAGNOSIS — M25.562 ACUTE PAIN OF LEFT KNEE: Primary | ICD-10-CM

## 2025-03-20 PROCEDURE — 97110 THERAPEUTIC EXERCISES: CPT | Mod: GP | Performed by: PHYSICAL THERAPIST

## 2025-03-20 PROCEDURE — 97530 THERAPEUTIC ACTIVITIES: CPT | Mod: GP | Performed by: PHYSICAL THERAPIST

## 2025-03-20 ASSESSMENT — ACTIVITIES OF DAILY LIVING (ADL)
HOW_WOULD_YOU_RATE_THE_OVERALL_FUNCTION_OF_YOUR_KNEE_DURING_YOUR_USUAL_DAILY_ACTIVITIES?: NEARLY NORMAL
PAIN: THE SYMPTOM AFFECTS MY ACTIVITY SLIGHTLY
SIT WITH YOUR KNEE BENT: ACTIVITY IS NOT DIFFICULT
WEAKNESS: THE SYMPTOM AFFECTS MY ACTIVITY SLIGHTLY
AS_A_RESULT_OF_YOUR_KNEE_INJURY,_HOW_WOULD_YOU_RATE_YOUR_CURRENT_LEVEL_OF_DAILY_ACTIVITY?: NEARLY NORMAL
SWELLING: I DO NOT HAVE THE SYMPTOM
WALK: ACTIVITY IS MINIMALLY DIFFICULT
SQUAT: ACTIVITY IS FAIRLY DIFFICULT
STAND: ACTIVITY IS MINIMALLY DIFFICULT
KNEE_ACTIVITY_OF_DAILY_LIVING_SUM: 44
RAW_SCORE: 44
RISE FROM A CHAIR: ACTIVITY IS MINIMALLY DIFFICULT
HOW_WOULD_YOU_RATE_THE_CURRENT_FUNCTION_OF_YOUR_KNEE_DURING_YOUR_USUAL_DAILY_ACTIVITIES_ON_A_SCALE_FROM_0_TO_100_WITH_100_BEING_YOUR_LEVEL_OF_KNEE_FUNCTION_PRIOR_TO_YOUR_INJURY_AND_0_BEING_THE_INABILITY_TO_PERFORM_ANY_OF_YOUR_USUAL_DAILY_ACTIVITIES?: 85
STIFFNESS: THE SYMPTOM AFFECTS MY ACTIVITY SLIGHTLY
KNEEL ON THE FRONT OF YOUR KNEE: I AM UNABLE TO DO THE ACTIVITY
GIVING WAY, BUCKLING OR SHIFTING OF KNEE: THE SYMPTOM AFFECTS MY ACTIVITY MODERATELY
GO UP STAIRS: ACTIVITY IS SOMEWHAT DIFFICULT
LIMPING: THE SYMPTOM AFFECTS MY ACTIVITY SLIGHTLY
KNEE_ACTIVITY_OF_DAILY_LIVING_SCORE: 62.86
GO DOWN STAIRS: ACTIVITY IS SOMEWHAT DIFFICULT

## 2025-04-03 ENCOUNTER — LAB (OUTPATIENT)
Dept: LAB | Facility: CLINIC | Age: 60
End: 2025-04-03
Payer: COMMERCIAL

## 2025-04-03 DIAGNOSIS — E89.0 POSTSURGICAL HYPOTHYROIDISM: ICD-10-CM

## 2025-04-03 DIAGNOSIS — C73 PAPILLARY THYROID CARCINOMA (H): ICD-10-CM

## 2025-04-03 DIAGNOSIS — E89.2 POST-SURGICAL HYPOPARATHYROIDISM: ICD-10-CM

## 2025-04-03 LAB
CALCIUM SERPL-MCNC: 8.9 MG/DL (ref 8.8–10.4)
CREAT SERPL-MCNC: 1.03 MG/DL (ref 0.51–0.95)
EGFRCR SERPLBLD CKD-EPI 2021: 63 ML/MIN/1.73M2
PHOSPHATE SERPL-MCNC: 3.3 MG/DL (ref 2.5–4.5)
T4 FREE SERPL-MCNC: 1.45 NG/DL (ref 0.9–1.7)
TSH SERPL DL<=0.005 MIU/L-ACNC: 0.99 UIU/ML (ref 0.3–4.2)

## 2025-04-10 LAB — SCANNED LAB RESULT: NORMAL

## 2025-06-10 ENCOUNTER — TELEPHONE (OUTPATIENT)
Dept: FAMILY MEDICINE | Facility: CLINIC | Age: 60
End: 2025-06-10
Payer: COMMERCIAL

## 2025-06-10 NOTE — TELEPHONE ENCOUNTER
Patient Quality Outreach    Patient is due for the following:   Depression  -  PHQ-9 needed  Physical Preventive Adult Physical    Action(s) Taken:   Schedule a Adult Preventative    Type of outreach:    Sent [employer] message.    Questions for provider review:    None         Kortney Chavez MA  Chart routed to None.

## 2025-06-29 DIAGNOSIS — E89.2 POST-SURGICAL HYPOPARATHYROIDISM: ICD-10-CM

## 2025-06-30 RX ORDER — MAGNESIUM OXIDE 400 MG/1
200 TABLET ORAL
Qty: 90 TABLET | Refills: 4 | Status: SHIPPED | OUTPATIENT
Start: 2025-06-30

## 2025-08-16 ENCOUNTER — HEALTH MAINTENANCE LETTER (OUTPATIENT)
Age: 60
End: 2025-08-16

## 2025-08-25 ENCOUNTER — TELEPHONE (OUTPATIENT)
Dept: FAMILY MEDICINE | Facility: CLINIC | Age: 60
End: 2025-08-25
Payer: COMMERCIAL

## (undated) DEVICE — GOWN XLG DISP 9545

## (undated) DEVICE — DRSG TELFA 3X8" 1238

## (undated) DEVICE — GLOVE BIOGEL PI INDICATOR 8.0 LF 41680

## (undated) DEVICE — NDL SPINAL 22GA 3.5" QUINCKE 405181

## (undated) DEVICE — SYR 10ML FINGER CONTROL W/O NDL 309695

## (undated) DEVICE — TUBING SUCTION 6"X3/16" N56A

## (undated) DEVICE — SOL WATER IRRIG 1000ML BOTTLE 2F7114

## (undated) DEVICE — Device

## (undated) DEVICE — KIT PROCEDURE FLUENT IN/OUT FLOWPAK TISS TRAP FLT-112S

## (undated) DEVICE — GLOVE BIOGEL PI ULTRATOUCH G SZ 7.5 42175

## (undated) DEVICE — KIT ENDO TURNOVER/PROCEDURE CARRY-ON 101822

## (undated) DEVICE — PAD PERI INDIV WRAP 11" 2022A

## (undated) DEVICE — CATH SELF FEMALE 14FR 6"

## (undated) DEVICE — SOL NACL 0.9% IRRIG 1000ML BOTTLE 2F7124

## (undated) DEVICE — SOL NACL 0.9% IRRIG 3000ML BAG 2B7477

## (undated) RX ORDER — FENTANYL CITRATE 50 UG/ML
INJECTION, SOLUTION INTRAMUSCULAR; INTRAVENOUS
Status: DISPENSED
Start: 2023-09-11

## (undated) RX ORDER — FENTANYL CITRATE-0.9 % NACL/PF 10 MCG/ML
PLASTIC BAG, INJECTION (ML) INTRAVENOUS
Status: DISPENSED
Start: 2023-09-11

## (undated) RX ORDER — NITROGLYCERIN 5 MG/ML
VIAL (ML) INTRAVENOUS
Status: DISPENSED
Start: 2018-11-30

## (undated) RX ORDER — KETOROLAC TROMETHAMINE 30 MG/ML
INJECTION, SOLUTION INTRAMUSCULAR; INTRAVENOUS
Status: DISPENSED
Start: 2023-09-11

## (undated) RX ORDER — PROPOFOL 10 MG/ML
INJECTION, EMULSION INTRAVENOUS
Status: DISPENSED
Start: 2023-09-11

## (undated) RX ORDER — LIDOCAINE HYDROCHLORIDE 10 MG/ML
INJECTION, SOLUTION EPIDURAL; INFILTRATION; INTRACAUDAL; PERINEURAL
Status: DISPENSED
Start: 2023-09-11

## (undated) RX ORDER — ONDANSETRON 2 MG/ML
INJECTION INTRAMUSCULAR; INTRAVENOUS
Status: DISPENSED
Start: 2023-09-11

## (undated) RX ORDER — FENTANYL CITRATE 50 UG/ML
INJECTION, SOLUTION INTRAMUSCULAR; INTRAVENOUS
Status: DISPENSED
Start: 2018-11-30

## (undated) RX ORDER — DEXAMETHASONE SODIUM PHOSPHATE 10 MG/ML
INJECTION, SOLUTION INTRAMUSCULAR; INTRAVENOUS
Status: DISPENSED
Start: 2023-09-11

## (undated) RX ORDER — ONDANSETRON 2 MG/ML
INJECTION INTRAMUSCULAR; INTRAVENOUS
Status: DISPENSED
Start: 2018-11-30

## (undated) RX ORDER — HEPARIN SODIUM 1000 [USP'U]/ML
INJECTION, SOLUTION INTRAVENOUS; SUBCUTANEOUS
Status: DISPENSED
Start: 2018-11-30

## (undated) RX ORDER — DIMENHYDRINATE 50 MG/ML
INJECTION, SOLUTION INTRAMUSCULAR; INTRAVENOUS
Status: DISPENSED
Start: 2023-09-11

## (undated) RX ORDER — LIDOCAINE HYDROCHLORIDE 10 MG/ML
INJECTION, SOLUTION EPIDURAL; INFILTRATION; INTRACAUDAL; PERINEURAL
Status: DISPENSED
Start: 2023-06-02